# Patient Record
Sex: FEMALE | Race: WHITE | NOT HISPANIC OR LATINO | Employment: OTHER | URBAN - METROPOLITAN AREA
[De-identification: names, ages, dates, MRNs, and addresses within clinical notes are randomized per-mention and may not be internally consistent; named-entity substitution may affect disease eponyms.]

---

## 2017-01-10 RX ORDER — METOPROLOL TARTRATE 50 MG/1
TABLET ORAL
Qty: 60 TABLET | Refills: 0 | Status: SHIPPED | OUTPATIENT
Start: 2017-01-10 | End: 2017-02-10 | Stop reason: SDUPTHER

## 2017-01-12 ENCOUNTER — OFFICE VISIT (OUTPATIENT)
Dept: INTERNAL MEDICINE | Facility: CLINIC | Age: 82
End: 2017-01-12
Payer: MEDICARE

## 2017-01-12 ENCOUNTER — TELEPHONE (OUTPATIENT)
Dept: INTERNAL MEDICINE | Facility: CLINIC | Age: 82
End: 2017-01-12

## 2017-01-12 VITALS
HEART RATE: 62 BPM | TEMPERATURE: 98 F | HEIGHT: 64 IN | BODY MASS INDEX: 30.14 KG/M2 | SYSTOLIC BLOOD PRESSURE: 130 MMHG | WEIGHT: 176.56 LBS | DIASTOLIC BLOOD PRESSURE: 70 MMHG

## 2017-01-12 DIAGNOSIS — E03.1 CONGENITAL HYPOTHYROIDISM WITHOUT GOITER: Chronic | ICD-10-CM

## 2017-01-12 DIAGNOSIS — I70.0 ATHEROSCLEROSIS OF AORTA: ICD-10-CM

## 2017-01-12 DIAGNOSIS — I10 ESSENTIAL HYPERTENSION: Chronic | ICD-10-CM

## 2017-01-12 DIAGNOSIS — N18.30 TYPE 2 DIABETES MELLITUS WITH STAGE 3 CHRONIC KIDNEY DISEASE, WITHOUT LONG-TERM CURRENT USE OF INSULIN: ICD-10-CM

## 2017-01-12 DIAGNOSIS — E11.22 TYPE 2 DIABETES MELLITUS WITH STAGE 3 CHRONIC KIDNEY DISEASE, WITHOUT LONG-TERM CURRENT USE OF INSULIN: ICD-10-CM

## 2017-01-12 PROCEDURE — 99499 UNLISTED E&M SERVICE: CPT | Mod: S$GLB,,, | Performed by: FAMILY MEDICINE

## 2017-01-12 PROCEDURE — 99999 PR PBB SHADOW E&M-EST. PATIENT-LVL III: CPT | Mod: PBBFAC,,, | Performed by: FAMILY MEDICINE

## 2017-01-12 PROCEDURE — 3078F DIAST BP <80 MM HG: CPT | Mod: S$GLB,,, | Performed by: FAMILY MEDICINE

## 2017-01-12 PROCEDURE — 1126F AMNT PAIN NOTED NONE PRSNT: CPT | Mod: S$GLB,,, | Performed by: FAMILY MEDICINE

## 2017-01-12 PROCEDURE — 1159F MED LIST DOCD IN RCRD: CPT | Mod: S$GLB,,, | Performed by: FAMILY MEDICINE

## 2017-01-12 PROCEDURE — 3075F SYST BP GE 130 - 139MM HG: CPT | Mod: S$GLB,,, | Performed by: FAMILY MEDICINE

## 2017-01-12 PROCEDURE — 1157F ADVNC CARE PLAN IN RCRD: CPT | Mod: S$GLB,,, | Performed by: FAMILY MEDICINE

## 2017-01-12 PROCEDURE — 1160F RVW MEDS BY RX/DR IN RCRD: CPT | Mod: S$GLB,,, | Performed by: FAMILY MEDICINE

## 2017-01-12 PROCEDURE — 99214 OFFICE O/P EST MOD 30 MIN: CPT | Mod: S$GLB,,, | Performed by: FAMILY MEDICINE

## 2017-01-12 NOTE — PROGRESS NOTES
Subjective:       Patient ID: Loida Holden is a 81 y.o. female.    Chief Complaint: Follow-up    HPI Comments: DM:       Pt is an 81 year DM who has  Little elevated A1C last time to 7.4. Her sugars today are getting better over the the last month.    Review of Systems   Constitutional: Negative.    Respiratory: Negative.    Gastrointestinal: Negative.    Genitourinary: Negative.    Musculoskeletal: Negative.    Psychiatric/Behavioral: Negative.        Objective:      Physical Exam   Constitutional: She is oriented to person, place, and time. She appears well-developed and well-nourished.   Cardiovascular: Normal rate and regular rhythm.    Pulmonary/Chest: Effort normal and breath sounds normal.   Feet:   Right Foot:   Protective Sensation: 10 sites tested. 10 sites sensed.   Skin Integrity: Positive for callus and dry skin.   Left Foot:   Protective Sensation: 10 sites tested. 10 sites sensed.   Skin Integrity: Positive for callus and dry skin.   Neurological: She is alert and oriented to person, place, and time.       Assessment:       1. Uncontrolled type 2 diabetes mellitus with complication, without long-term current use of insulin    2. Type 2 diabetes mellitus with stage 3 chronic kidney disease, without long-term current use of insulin    3. Atherosclerosis of aorta    4. Congenital hypothyroidism without goiter    5. Essential hypertension        Plan:       Uncontrolled type 2 diabetes mellitus with complication, without long-term current use of insulin  Comments:  Will continue to monitor  Orders:  -     Hemoglobin A1c; Future; Expected date: 2/1/17  -     Microalbumin/creatinine urine ratio; Future; Expected date: 2/1/17    Type 2 diabetes mellitus with stage 3 chronic kidney disease, without long-term current use of insulin  Comments:  Will continue to monitor kidney.    Atherosclerosis of aorta  Comments:  Stable at this time    Congenital hypothyroidism without goiter  Comments:  Thyroid  is stable    Essential hypertension  Comments:  Pt BP is controlled. will get a Lipid panel and CMP with CBC  Orders:  -     Comprehensive metabolic panel; Future; Expected date: 2/1/17  -     CBC auto differential; Future; Expected date: 2/1/17  -     Lipid panel; Future; Expected date: 2/1/17

## 2017-01-12 NOTE — MR AVS SNAPSHOT
[facility] - Internal Medicine  [address] [place] [address]  Phone: [phone]  Fax: [phone]                  Loida Holden   2017 9:20 AM   Office Visit    Description:  Female : 1935   Provider:  Twin Petersen MD   Department:  [facility] - Internal Medicine           Reason for Visit     Follow-up           Diagnoses this Visit        Comments    Uncontrolled type 2 diabetes mellitus with complication, without long-term current use of insulin    -  Primary Will continue to monitor    Type 2 diabetes mellitus with stage 3 chronic kidney disease, without long-term current use of insulin     Will continue to monitor kidney.    Atherosclerosis of aorta     Stable at this time    Congenital hypothyroidism without goiter     Thyroid is stable    Essential hypertension     Pt BP is controlled. will get a Lipid panel and CMP with CBC           To Do List           Future Appointments        Provider Department Dept Phone    2017 9:30 AM FIELDS, VISUAL-ONE [facility] Ophthalmology [phone]    2017 10:15 AM George Bailey MD [facility] Ophthalmology [phone]      Goals (5 Years of Data)     None      Follow-Up and Disposition     Return in about 4 months (around 2017).      [facility]     [facility] On [facility] Nurse Care Line -  Assistance  Registered nurses in the [facility] provide clinical advisement, health education, appointment booking, and other advisory services.  Call for this free service at [phone].             Medications           Message regarding Medications     Verify the changes and/or additions to your medication regime listed below are the same as discussed with your clinician today.  If any of these changes or additions are incorrect, please notify your healthcare provider.        STOP taking these medications     aspirin (ECOTRIN) 81 MG EC tablet Take 81 mg by mouth once daily.    betamethasone valerate 0.1%  "(VALISONE) 0.1 % Crea Apply topically 2 (two) times daily.           Verify that the below list of medications is an accurate representation of the medications you are currently taking.  If none reported, the list may be blank. If incorrect, please contact your healthcare provider. Carry this list with you in case of emergency.           Current Medications     ACCU-CHEK NANNETTE PLUS METER [facility]     Al hyd-Mg tr-alg ac-sod bicarb (GAVISCON) 80-14.2 mg Chew Take by mouth.    allopurinol (ZYLOPRIM) 100 MG tablet TAKE ONE TABLET BY MOUTH ONCE DAILY    BIOTIN ORAL Take 5,000 mcg by mouth once daily.    blood sugar diagnostic (TRUETEST TEST STRIPS) Strp 1 strip by Misc.(Non-Drug; Combo Route) route 3 (three) times daily. True test strips    hydrochlorothiazide (HYDRODIURIL) 25 MG tablet Take 1 tablet (25 mg total) by mouth once daily.    lancets 33 gauge Misc 1 lancet by Misc.(Non-Drug; Combo Route) route 3 (three) times daily.    levothyroxine (SYNTHROID) 50 MCG tablet Take 1 tablet (50 mcg total) by mouth before breakfast.    metoprolol tartrate (LOPRESSOR) 50 MG tablet TAKE ONE TABLET BY MOUTH TWICE DAILY    sodium,potassium,mag sulfates (SUPREP BOWEL PREP KIT) 17.5-3.13-1.6 gram SolR Take 1 kit by mouth as directed.    walker Misc Use rollator walker to reduce stress on knee           Clinical Reference Information           Vital Signs - Last Recorded  Most recent update: 1/12/2017  9:13 AM by Charlee Mills LPN    BP Pulse Temp    130/70 (BP Location: Right arm, Patient Position: Sitting, BP Method: Manual) 62 97.5 °F (36.4 °C) (Tympanic)    Ht Wt BMI    5' 4" (1.626 m) 80.1 kg (176 lb 9.4 oz) 30.31 kg/m2      Blood Pressure          Most Recent Value    BP  130/70      Allergies as of 1/12/2017     Codeine    Doxycycline    Iodine And Iodide Containing Products    Ivp Dye  [Iodinated Contrast Media - Iv Dye]    Latex, Natural Rubber    Metronidazole Hcl    Pantoprazole    Procaine    Rosuvastatin    Simvastatin "    Sulfasalazine      Immunizations Administered on Date of Encounter - 1/12/2017     None      Orders Placed During Today's Visit     Future Labs/Procedures Expected by Expires    CBC auto differential  2/1/2017 3/13/2018    Comprehensive metabolic panel  2/1/2017 3/13/2018    Hemoglobin A1c  2/1/2017 3/13/2018    Lipid panel  2/1/2017 1/12/2018    Microalbumin/creatinine urine ratio  2/1/2017 3/13/2018

## 2017-01-12 NOTE — TELEPHONE ENCOUNTER
Patient informed that she will received an letter in the mail letting her know when it is time to get patient in to be seen,

## 2017-01-12 NOTE — TELEPHONE ENCOUNTER
----- Message from Deirdre Falcon sent at 1/12/2017 11:12 AM CST -----  Contact: pt  States she was seen this morning and she needs to know when her next appt with Dr Petersen should be. Please call pt at [phone]. Thank you

## 2017-01-22 DIAGNOSIS — I10 ESSENTIAL HYPERTENSION: ICD-10-CM

## 2017-01-22 RX ORDER — HYDROCHLOROTHIAZIDE 25 MG/1
TABLET ORAL
Qty: 60 TABLET | Refills: 0 | Status: SHIPPED | OUTPATIENT
Start: 2017-01-22 | End: 2017-03-20 | Stop reason: SDUPTHER

## 2017-01-25 ENCOUNTER — OFFICE VISIT (OUTPATIENT)
Dept: CARDIOLOGY | Facility: CLINIC | Age: 82
End: 2017-01-25
Payer: MEDICARE

## 2017-01-25 ENCOUNTER — OFFICE VISIT (OUTPATIENT)
Dept: INTERNAL MEDICINE | Facility: CLINIC | Age: 82
End: 2017-01-25
Payer: MEDICARE

## 2017-01-25 ENCOUNTER — TELEPHONE (OUTPATIENT)
Dept: CARDIOLOGY | Facility: CLINIC | Age: 82
End: 2017-01-25

## 2017-01-25 ENCOUNTER — TELEPHONE (OUTPATIENT)
Dept: INTERNAL MEDICINE | Facility: CLINIC | Age: 82
End: 2017-01-25

## 2017-01-25 VITALS
WEIGHT: 174.81 LBS | TEMPERATURE: 97 F | SYSTOLIC BLOOD PRESSURE: 180 MMHG | HEIGHT: 64 IN | DIASTOLIC BLOOD PRESSURE: 102 MMHG | HEART RATE: 67 BPM | BODY MASS INDEX: 29.84 KG/M2

## 2017-01-25 VITALS
DIASTOLIC BLOOD PRESSURE: 88 MMHG | SYSTOLIC BLOOD PRESSURE: 184 MMHG | HEIGHT: 64 IN | HEART RATE: 68 BPM | WEIGHT: 168 LBS | BODY MASS INDEX: 28.68 KG/M2

## 2017-01-25 DIAGNOSIS — I10 ESSENTIAL HYPERTENSION: Primary | Chronic | ICD-10-CM

## 2017-01-25 DIAGNOSIS — E11.69 HYPERLIPIDEMIA ASSOCIATED WITH TYPE 2 DIABETES MELLITUS: Chronic | ICD-10-CM

## 2017-01-25 DIAGNOSIS — I25.10 CORONARY ARTERY DISEASE, OCCLUSIVE: Chronic | ICD-10-CM

## 2017-01-25 DIAGNOSIS — D32.9 MENINGIOMA: ICD-10-CM
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
## 2017-01-25 DIAGNOSIS — Z86.73 HISTORY OF CVA (CEREBROVASCULAR ACCIDENT): ICD-10-CM

## 2017-01-25 DIAGNOSIS — I10 ESSENTIAL HYPERTENSION: Primary | ICD-10-CM

## 2017-01-25 DIAGNOSIS — E78.5 HYPERLIPIDEMIA ASSOCIATED WITH TYPE 2 DIABETES MELLITUS: Chronic | ICD-10-CM

## 2017-01-25 DIAGNOSIS — E11.22 TYPE 2 DIABETES MELLITUS WITH STAGE 3 CHRONIC KIDNEY DISEASE, WITHOUT LONG-TERM CURRENT USE OF INSULIN: ICD-10-CM

## 2017-01-25 DIAGNOSIS — N18.30 TYPE 2 DIABETES MELLITUS WITH STAGE 3 CHRONIC KIDNEY DISEASE, WITHOUT LONG-TERM CURRENT USE OF INSULIN: ICD-10-CM

## 2017-01-25 DIAGNOSIS — I51.89 LEFT VENTRICULAR DIASTOLIC DYSFUNCTION WITH PRESERVED SYSTOLIC FUNCTION: ICD-10-CM

## 2017-01-25 PROCEDURE — 99999 PR PBB SHADOW E&M-EST. PATIENT-LVL III: CPT | Mod: PBBFAC,,, | Performed by: FAMILY MEDICINE

## 2017-01-25 PROCEDURE — 1159F MED LIST DOCD IN RCRD: CPT | Mod: S$GLB,,, | Performed by: PHYSICIAN ASSISTANT

## 2017-01-25 PROCEDURE — 93000 ELECTROCARDIOGRAM COMPLETE: CPT | Mod: S$GLB,,, | Performed by: INTERNAL MEDICINE

## 2017-01-25 PROCEDURE — 3080F DIAST BP >= 90 MM HG: CPT | Mod: S$GLB,,, | Performed by: FAMILY MEDICINE

## 2017-01-25 PROCEDURE — 99214 OFFICE O/P EST MOD 30 MIN: CPT | Mod: S$GLB,,, | Performed by: PHYSICIAN ASSISTANT

## 2017-01-25 PROCEDURE — 1159F MED LIST DOCD IN RCRD: CPT | Mod: S$GLB,,, | Performed by: FAMILY MEDICINE

## 2017-01-25 PROCEDURE — 99213 OFFICE O/P EST LOW 20 MIN: CPT | Mod: S$GLB,,, | Performed by: FAMILY MEDICINE

## 2017-01-25 PROCEDURE — 99999 PR PBB SHADOW E&M-EST. PATIENT-LVL III: CPT | Mod: PBBFAC,,, | Performed by: PHYSICIAN ASSISTANT

## 2017-01-25 PROCEDURE — 1160F RVW MEDS BY RX/DR IN RCRD: CPT | Mod: S$GLB,,, | Performed by: FAMILY MEDICINE

## 2017-01-25 PROCEDURE — 1157F ADVNC CARE PLAN IN RCRD: CPT | Mod: S$GLB,,, | Performed by: FAMILY MEDICINE

## 2017-01-25 PROCEDURE — 1157F ADVNC CARE PLAN IN RCRD: CPT | Mod: S$GLB,,, | Performed by: PHYSICIAN ASSISTANT

## 2017-01-25 PROCEDURE — 1126F AMNT PAIN NOTED NONE PRSNT: CPT | Mod: S$GLB,,, | Performed by: PHYSICIAN ASSISTANT

## 2017-01-25 PROCEDURE — 3079F DIAST BP 80-89 MM HG: CPT | Mod: S$GLB,,, | Performed by: PHYSICIAN ASSISTANT

## 2017-01-25 PROCEDURE — 1160F RVW MEDS BY RX/DR IN RCRD: CPT | Mod: S$GLB,,, | Performed by: PHYSICIAN ASSISTANT

## 2017-01-25 PROCEDURE — 3077F SYST BP >= 140 MM HG: CPT | Mod: S$GLB,,, | Performed by: PHYSICIAN ASSISTANT

## 2017-01-25 PROCEDURE — 3077F SYST BP >= 140 MM HG: CPT | Mod: S$GLB,,, | Performed by: FAMILY MEDICINE

## 2017-01-25 RX ORDER — AMLODIPINE BESYLATE 5 MG/1
2.5 TABLET ORAL DAILY
Qty: 30 TABLET | Refills: 3 | Status: SHIPPED | OUTPATIENT
Start: 2017-01-25 | End: 2017-02-16 | Stop reason: SDUPTHER

## 2017-01-25 NOTE — PROGRESS NOTES
"Subjective:    Patient ID:  Loida Holden is a 81 y.o. female who presents for follow-up of HTN      HPI   Ms. Holden is an 81 year old female patient with a PMHx of CAD s/p stents, hyperlipidemia, HTN, DM, and obesity who presents today for follow-up. Patient was at an outside facility for GI endoscopy (Dr. Covarrubias) and had issues with high BP and fall post-procedure. She was seen by primary care earlier today and referred to us for additional evaluation. Patient reports she still feels "off-balance" since procedure. States she fell backwards and hit her head against the wall and also hit her elbows. Denies any associated chest pain, palpitations, or SOB. No LOC per patient. BP after incident was noted to be elevated (systolic > 200), slightly improved at today's visit. Patient reports compliance with her medications, has only had one dose of Lopressor this AM. She reports her BP fluctuates at home but is generally elevated. EKG today in clinic shows sinus bradycardia with occasional PVC's, possible inferior and anterior infarct.     Review of Systems   Constitution: Negative for chills, decreased appetite, fever, weakness and malaise/fatigue.   HENT: Negative for congestion, headaches, hoarse voice and sore throat.    Eyes: Negative for blurred vision and discharge.   Cardiovascular: Negative for chest pain, claudication, cyanosis, dyspnea on exertion, irregular heartbeat, leg swelling, near-syncope, orthopnea, palpitations and paroxysmal nocturnal dyspnea.   Respiratory: Negative for cough, hemoptysis, shortness of breath, snoring, sputum production and wheezing.    Endocrine: Negative for cold intolerance and heat intolerance.   Hematologic/Lymphatic: Negative for bleeding problem. Does not bruise/bleed easily.   Skin: Negative for rash.   Musculoskeletal: Positive for falls and joint pain. Negative for arthritis, back pain, joint swelling, muscle cramps, muscle weakness and myalgias. " "  Gastrointestinal: Negative for abdominal pain, constipation, diarrhea, heartburn, melena and nausea.   Genitourinary: Negative for hematuria.   Neurological: Negative for dizziness, focal weakness, light-headedness, loss of balance, numbness, paresthesias and seizures.   Psychiatric/Behavioral: Negative for memory loss. The patient does not have insomnia.    Allergic/Immunologic: Negative for hives.     Visit Vitals    BP (!) 184/88    Pulse 68    Ht 5' 4" (1.626 m)    Wt 76.2 kg (167 lb 15.9 oz)    BMI 28.84 kg/m2       Objective:    Physical Exam   Constitutional: She is oriented to person, place, and time. She appears well-developed and well-nourished. No distress.   HENT:   Head: Normocephalic and atraumatic.   Eyes: Pupils are equal, round, and reactive to light. Right eye exhibits no discharge. Left eye exhibits no discharge.   Neck: Neck supple. No JVD present. No tracheal deviation present. No thyromegaly present.   Cardiovascular: Normal rate, regular rhythm, S1 normal, S2 normal and normal heart sounds.  PMI is not displaced.  Exam reveals no gallop, no S3, no S4 and no friction rub.    No murmur heard.  Pulses:       Radial pulses are 2+ on the right side, and 2+ on the left side.   Pulmonary/Chest: Effort normal and breath sounds normal. No respiratory distress. She has no wheezes. She has no rales.   Abdominal: Soft. She exhibits no distension. There is no tenderness. There is no rebound.   Musculoskeletal: She exhibits no edema.   Neurological: She is alert and oriented to person, place, and time.   Skin: Skin is warm and dry. She is not diaphoretic. No erythema.   Psychiatric: She has a normal mood and affect. Her behavior is normal. Thought content normal.   Nursing note and vitals reviewed.      2D Echo CONCLUSIONS     1 - Concentric remodeling.     2 - Normal left ventricular systolic function (EF 55-60%).     3 - Left ventricular diastolic dysfunction.     4 - Normal right ventricular " systolic function .     5 - The estimated PA systolic pressure is 15 mmHg.     Impression: NORMAL MYOCARDIAL PERFUSION  1. The perfusion scan is free of evidence for myocardial ischemia or injury. The inferior wall is obscured by bowel.  2. Resting wall motion is physiologic.   3. Resting LV function is normal.   4. The ventricular volumes are normal at rest and stress.   5. The extracardiac distribution of radioactivity is normal.   Assessment:       1. Essential hypertension    2. Coronary artery disease, occlusive    3. Left ventricular diastolic dysfunction with preserved systolic function    4. Type 2 diabetes mellitus with stage 3 chronic kidney disease, without long-term current use of insulin    5. Hyperlipidemia associated with type 2 diabetes mellitus    6. History of CVA (cerebrovascular accident)    7. Fall, subsequent encounter       Patient who presents with elevated BP and fall s/p GI procedure this AM. As patient her head and was feeling unwell in the office, I recommended ED for further evaluation and treatment. She refused. With respect to her BP, will consider adding amlodipine to regimen and assessing response. Will verify no hydralazine has been sent to pharmacy beforehand.   Plan:     -Strongly advised ED evaluation, patient refused  -Continue current meds for now  -Will consider adding low dose amlodipine  -Cardiac low salt diet  -Follow-up TBA    Chart reviewed. Dr. Hough agrees with plan as outlined above.

## 2017-01-25 NOTE — TELEPHONE ENCOUNTER
----- Message from REBECCA Ray sent at 1/25/2017  1:45 PM CST -----  Can you please call [facility] to see if hydralazine was called in?    Thanks

## 2017-01-25 NOTE — MR AVS SNAPSHOT
[facility] Cardiology  [address] [place] [address]  Phone: [phone]  Fax: [phone]                  Loida Holden   2017 1:00 PM   Office Visit    Description:  Female : 1935   Provider:  REBECCA Ray   Department:  [facility] - Cardiology           Reason for Visit     Hypertension     Hyperlipidemia           Diagnoses this Visit        Comments    Coronary artery disease, occlusive    -  Primary     Essential hypertension         Left ventricular diastolic dysfunction with preserved systolic function         Type 2 diabetes mellitus with stage 3 chronic kidney disease, without long-term current use of insulin         Hyperlipidemia associated with type 2 diabetes mellitus         History of CVA (cerebrovascular accident)         Fall, subsequent encounter                To Do List           Future Appointments        Provider Department Dept Phone    2017 8:20 AM Kim Dhillon MD [facility] Neurology [phone]    2/15/2017 9:00 AM LABORATORY, [facility] [phone]    2/15/2017 9:10 AM SPECIMEN, [facility] [phone]    3/9/2017 11:20 AM Conrad Hough MD [facility] Cardiology [phone]    2017 9:30 AM FIELDS, VISUAL-ONE [facility] Ophthalmology [phone]      Goals (5 Years of Data)     None      [facility]     [facility] Nurse Care Line -  Assistance  Registered nurses in the [facility] provide clinical advisement, health education, appointment booking, and other advisory services.  Call for this free service at [phone].             Medications           Message regarding Medications     Verify the changes and/or additions to your medication regime listed below are the same as discussed with your clinician today.  If any of these changes or additions are incorrect, please notify your healthcare provider.             Verify that the below list of medications  "is an accurate representation of the medications you are currently taking.  If none reported, the list may be blank. If incorrect, please contact your healthcare provider. Carry this list with you in case of emergency.           Current Medications     ACCU-CHEK [place]ANNET[place] PLUS METER [facility]     Al hyd-Mg tr-alg ac-sod bicarb (GAVISCON) 80-14.2 mg Chew Take by mouth.    allopurinol (ZYLOPRIM) 100 MG tablet TAKE ONE TABLET BY MOUTH ONCE DAILY    BIOTIN ORAL Take 5,000 mcg by mouth once daily.    blood sugar diagnostic (TRUETEST TEST STRIPS) Strp 1 strip by Misc.(Non-Drug; Combo Route) route 3 (three) times daily. True test strips    hydrochlorothiazide (HYDRODIURIL) 25 MG tablet TAKE ONE TABLET BY MOUTH ONCE DAILY TAKE  ONE  ADDITIONAL  TABLET  A  DAY  FOR  FLUID  RETENTION    lancets 33 gauge Misc 1 lancet by Misc.(Non-Drug; Combo Route) route 3 (three) times daily.    levothyroxine (SYNTHROID) 50 MCG tablet Take 1 tablet (50 mcg total) by mouth before breakfast.    metoprolol tartrate (LOPRESSOR) 50 MG tablet TAKE ONE TABLET BY MOUTH TWICE DAILY    sodium,potassium,mag sulfates (SUPREP BOWEL PREP KIT) 17.5-3.13-1.6 gram SolR Take 1 kit by mouth as directed.    walker Misc Use rollator walker to reduce stress on knee           Clinical Reference Information           Vital Signs - Last Recorded  Most recent update: 1/25/2017  1:26 PM by Reyna French MA    BP Pulse Ht Wt BMI    (!) 184/88 68 5' 4" (1.626 m) 76.2 kg (167 lb 15.9 oz) 28.84 kg/m2      Blood Pressure          Most Recent Value    BP  (!)  184/88      Allergies as of 1/25/2017     Codeine    Doxycycline    Iodine And Iodide Containing Products    Ivp Dye  [Iodinated Contrast Media - Iv Dye]    Latex, Natural Rubber    Metronidazole Hcl    Pantoprazole    Procaine    Rosuvastatin    Simvastatin    Sulfasalazine      Immunizations Administered on Date of Encounter - 1/25/2017     None      Orders Placed During Today's Visit      Normal Orders This Visit    IN " OFFICE EKG 12-LEAD (to [place])

## 2017-01-25 NOTE — TELEPHONE ENCOUNTER
----- Message from Rafaela Parrish sent at 1/25/2017  9:14 AM CST -----  Contact: iLlia Covarrubias [phone]   Would like to consult with the nurse about patient, Pt has a 11am appointment for today schedule.  Please call back @ [phone].  Thanks-[facility]

## 2017-01-25 NOTE — TELEPHONE ENCOUNTER
Returned call to Marcela. She stated that patient was there for a procedure. Before the procedure her BP was 172/76 and after the procedure it was 118/68. She stated that the patient was sedated for the procedure and when she stood up to put on her cloths she started to black out and fall. She hit her elbow on the wall and her BP was 200/78. Patient has an appointment today at 11:00

## 2017-01-25 NOTE — TELEPHONE ENCOUNTER
Spoke with pt son notified him the information. Pt is scheduled to see Dr. Hough 2/16/17 appt letter mailed.

## 2017-01-25 NOTE — PATIENT INSTRUCTIONS
Hydralazine:     1 tablet if your top number is greater than 160 and/or the bottom is greater than 100. Repeat your blood pressure 2 hours after taking the medication. You can do this up to 4 more times if remains high but always check blood pressure 2 hours after taking med.

## 2017-01-25 NOTE — PROGRESS NOTES
Subjective:       Patient ID: Loida Holden is a 81 y.o. female.    Chief Complaint: Hospital Follow Up and Fall    HPI Comments: HTN:        Pt has up and down blood pressure and fell after a GI procedure today. Pt presented to the procedure with /76. Pt is no presenting with 180/102.     Review of Systems   Constitutional: Negative.    Respiratory: Negative.    Cardiovascular: Negative.    Genitourinary: Negative.    Neurological: Negative.    Hematological: Negative.        Objective:      Physical Exam   Constitutional: She is oriented to person, place, and time. She appears well-developed and well-nourished.   Cardiovascular: Normal rate and regular rhythm.    Pulmonary/Chest: Effort normal and breath sounds normal.   Abdominal: Soft. Bowel sounds are normal.   Neurological: She is alert and oriented to person, place, and time.   Skin: Skin is dry.       Assessment:       1. Essential hypertension    2. Meningioma        Plan:       Essential hypertension  Comments:  Will start pt on hydralazine as needed for now and have her go back to cardiology    Meningioma  Comments:  Will have pt return to neurology

## 2017-01-25 NOTE — MR AVS SNAPSHOT
[facility] Internal Medicine  [address] [place] [address]  Phone: [phone]  Fax: [phone]                  Loida Holden   2017 11:00 AM   Office Visit    Description:  Female : 1935   Provider:  Twin Petersen MD   Department:  [facility] - Internal Medicine           Reason for Visit     Hospital Follow Up     Fall           Diagnoses this Visit        Comments    Essential hypertension    -  Primary Will start pt on hydralazine as needed for now and have her go back to cardiology    Meningioma     Will have pt return to neurology           To Do List           Future Appointments        Provider Department Dept Phone    2/15/2017 9:00 AM LABORATORY, [facility] [phone]    2/15/2017 9:10 AM SPECIMEN, [facility] [phone]    3/9/2017 11:20 AM Conrad Hough MD [facility] Cardiology [phone]    2017 9:30 AM FIELDS, VISUAL-ONE [facility] Ophthalmology [phone]    2017 10:15 AM George Bailey MD [facility] Ophthalmology [phone]      Goals (5 Years of Data)     None      Follow-Up and Disposition     Return in about 3 months (around 2017).      [facility]     [facility] Nurse Care Line -  Assistance  Registered nurses in the [facility] provide clinical advisement, health education, appointment booking, and other advisory services.  Call for this free service at [phone].             Medications           Message regarding Medications     Verify the changes and/or additions to your medication regime listed below are the same as discussed with your clinician today.  If any of these changes or additions are incorrect, please notify your healthcare provider.             Verify that the below list of medications is an accurate representation of the medications you are currently taking.  If none reported, the list may be blank. If incorrect, please contact  "your healthcare provider. Carry this list with you in case of emergency.           Current Medications     ACCU-CHEK NANNETTE PLUS METER [facility]     Al hyd-Mg tr-alg ac-sod bicarb (GAVISCON) 80-14.2 mg Chew Take by mouth.    allopurinol (ZYLOPRIM) 100 MG tablet TAKE ONE TABLET BY MOUTH ONCE DAILY    BIOTIN ORAL Take 5,000 mcg by mouth once daily.    blood sugar diagnostic (TRUETEST TEST STRIPS) Strp 1 strip by Misc.(Non-Drug; Combo Route) route 3 (three) times daily. True test strips    hydrochlorothiazide (HYDRODIURIL) 25 MG tablet TAKE ONE TABLET BY MOUTH ONCE DAILY TAKE  ONE  ADDITIONAL  TABLET  A  DAY  FOR  FLUID  RETENTION    lancets 33 gauge Misc 1 lancet by Misc.(Non-Drug; Combo Route) route 3 (three) times daily.    levothyroxine (SYNTHROID) 50 MCG tablet Take 1 tablet (50 mcg total) by mouth before breakfast.    metoprolol tartrate (LOPRESSOR) 50 MG tablet TAKE ONE TABLET BY MOUTH TWICE DAILY    sodium,potassium,mag sulfates (SUPREP BOWEL PREP KIT) 17.5-3.13-1.6 gram SolR Take 1 kit by mouth as directed.    walker Misc Use rollator walker to reduce stress on knee           Clinical Reference Information           Vital Signs - Last Recorded  Most recent update: 1/25/2017 11:48 AM by Charlee Mills LPN    BP Pulse Temp    (!) 180/102 (BP Location: Right arm, Patient Position: Sitting, BP Method: Manual) 67 97.1 °F (36.2 °C) (Tympanic)    Ht Wt BMI    5' 4" (1.626 m) 79.3 kg (174 lb 13.2 oz) 30.01 kg/m2      Blood Pressure          Most Recent Value    BP  (!)  180/102      Allergies as of 1/25/2017     Codeine    Doxycycline    Iodine And Iodide Containing Products    Ivp Dye  [Iodinated Contrast Media - Iv Dye]    Latex, Natural Rubber    Metronidazole Hcl    Pantoprazole    Procaine    Rosuvastatin    Simvastatin    Sulfasalazine      Immunizations Administered on Date of Encounter - 1/25/2017     None      Instructions    Hydralazine:     1 tablet if your top number is greater than 160 and/or the bottom is " greater than 100. Repeat your blood pressure 2 hours after taking the medication. You can do this up to 4 more times if remains high but always check blood pressure 2 hours after taking med.

## 2017-01-25 NOTE — TELEPHONE ENCOUNTER
----- Message from REBECCA Ray sent at 1/25/2017  4:30 PM CST -----  Please phone patient. I do not have any record of hydralazine being sent. Start amlodipine 2.5 mg daily, prescription sent to pharmacy. She needs to log her BP in AM and PM at least 1-2 hours after meds. Follow-up with Dr. Hough in 2 weeks.     She needs to call if any low readings    Thanks

## 2017-01-26 ENCOUNTER — OFFICE VISIT (OUTPATIENT)
Dept: NEUROLOGY | Facility: CLINIC | Age: 82
End: 2017-01-26
Payer: MEDICARE

## 2017-01-26 VITALS
WEIGHT: 175.94 LBS | DIASTOLIC BLOOD PRESSURE: 86 MMHG | SYSTOLIC BLOOD PRESSURE: 170 MMHG | HEART RATE: 56 BPM | HEIGHT: 64 IN | BODY MASS INDEX: 30.04 KG/M2

## 2017-01-26 DIAGNOSIS — R29.6 RECURRENT FALLS: ICD-10-CM

## 2017-01-26 DIAGNOSIS — I67.9 DISTURBANCES OF VISION DUE TO CEREBROVASCULAR DISEASE: ICD-10-CM

## 2017-01-26 DIAGNOSIS — H53.9 DISTURBANCES OF VISION DUE TO CEREBROVASCULAR DISEASE: ICD-10-CM

## 2017-01-26 DIAGNOSIS — G93.0 ARACHNOID CYST: Primary | ICD-10-CM

## 2017-01-26 DIAGNOSIS — R51.9 HEADACHE DISORDER: ICD-10-CM

## 2017-01-26 PROCEDURE — 1159F MED LIST DOCD IN RCRD: CPT | Mod: S$GLB,,, | Performed by: PSYCHIATRY & NEUROLOGY

## 2017-01-26 PROCEDURE — 99215 OFFICE O/P EST HI 40 MIN: CPT | Mod: S$GLB,,, | Performed by: PSYCHIATRY & NEUROLOGY

## 2017-01-26 PROCEDURE — 1160F RVW MEDS BY RX/DR IN RCRD: CPT | Mod: S$GLB,,, | Performed by: PSYCHIATRY & NEUROLOGY

## 2017-01-26 PROCEDURE — 3079F DIAST BP 80-89 MM HG: CPT | Mod: S$GLB,,, | Performed by: PSYCHIATRY & NEUROLOGY

## 2017-01-26 PROCEDURE — 1157F ADVNC CARE PLAN IN RCRD: CPT | Mod: S$GLB,,, | Performed by: PSYCHIATRY & NEUROLOGY

## 2017-01-26 PROCEDURE — 3077F SYST BP >= 140 MM HG: CPT | Mod: S$GLB,,, | Performed by: PSYCHIATRY & NEUROLOGY

## 2017-01-26 PROCEDURE — 1126F AMNT PAIN NOTED NONE PRSNT: CPT | Mod: S$GLB,,, | Performed by: PSYCHIATRY & NEUROLOGY

## 2017-01-26 PROCEDURE — 99999 PR PBB SHADOW E&M-EST. PATIENT-LVL III: CPT | Mod: PBBFAC,,, | Performed by: PSYCHIATRY & NEUROLOGY

## 2017-01-26 NOTE — MR AVS SNAPSHOT
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                To Do List           Future Appointments        Provider Department Dept Phone    2017 9:30 AM LAB, SAME DAY [facility] [phone]    2017 7:15 AM [facility] [phone]    2/15/2017 9:00 AM LABORATORY, [facility] [phone]    2/15/2017 9:10 AM SPECIMEN, [facility] [phone]    2017 3:00 PM Conrad Hough MD [facility] [phone]      Goals (5 Years of Data)     None      [facility]     [facility] Nurse Care Line -  Assistance  Registered nurses in the [facility] provide clinical advisement, health education, appointment booking, and other advisory services.  Call for this free service at [phone].             Medications           Message regarding Medications     Verify the changes and/or additions to your medication regime listed below are the same as discussed with your clinician today.  If any of these changes or additions are incorrect, please notify your healthcare provider.             Verify that the below list of medications is an accurate representation of the medications you are currently taking.  If none reported, the list may be blank. If incorrect, please contact your healthcare provider. Carry this list with you in case of emergency.           Current Medications     ACCU-CHEK NANNETTE PLUS METER Misc     Al hyd-Mg  "tr-alg ac-sod bicarb (GAVISCON) 80-14.2 mg Chew Take by mouth.    allopurinol (ZYLOPRIM) 100 MG tablet TAKE ONE TABLET BY MOUTH ONCE DAILY    amlodipine (NORVASC) 5 MG tablet Take 0.5 tablets (2.5 mg total) by mouth once daily.    BIOTIN ORAL Take 5,000 mcg by mouth once daily.    blood sugar diagnostic (TRUETEST TEST STRIPS) Strp 1 strip by Misc.(Non-Drug; Combo Route) route 3 (three) times daily. True test strips    hydrochlorothiazide (HYDRODIURIL) 25 MG tablet TAKE ONE TABLET BY MOUTH ONCE DAILY TAKE  ONE  ADDITIONAL  TABLET  A  DAY  FOR  FLUID  RETENTION    lancets 33 gauge Misc 1 lancet by Misc.(Non-Drug; Combo Route) route 3 (three) times daily.    levothyroxine (SYNTHROID) 50 MCG tablet Take 1 tablet (50 mcg total) by mouth before breakfast.    metoprolol tartrate (LOPRESSOR) 50 MG tablet TAKE ONE TABLET BY MOUTH TWICE DAILY    sodium,potassium,mag sulfates (SUPREP BOWEL PREP KIT) 17.5-3.13-1.6 gram SolR Take 1 kit by mouth as directed.    walker Misc Use rollator walker to reduce stress on knee           Clinical Reference Information           Vital Signs - Last Recorded  Most recent update: 1/26/2017  8:19 AM by Chantell Davis LPN    BP Pulse Ht Wt BMI    (!) 170/86 (!) 56 5' 4" (1.626 m) 79.8 kg (175 lb 14.8 oz) 30.2 kg/m2      Blood Pressure          Most Recent Value    BP  (!)  170/86      Allergies as of 1/26/2017     Codeine    Doxycycline    Iodine And Iodide Containing Products    Ivp Dye  [Iodinated Contrast Media - Iv Dye]    Latex, Natural Rubber    Metronidazole Hcl    Pantoprazole    Procaine    Rosuvastatin    Simvastatin    Sulfasalazine      Immunizations Administered on Date of Encounter - 1/26/2017     None      Orders Placed During Today's Visit     Future Labs/Procedures Expected by Expires    Creatinine, serum  1/26/2017 3/27/2018    MRI Brain W WO Contrast  1/26/2017 1/26/2018      "

## 2017-01-26 NOTE — PROGRESS NOTES
Progress note  Neurology      Neurology follow up:  For: Arachnoid cyst  Vertigo  And headache  Recurrent falls     SUBJECTIVE:      HPI:   She fell yesterday after colonoscopy and EGD had done. She is having more problem in the left eye. She sees less . She said that in October she fell 3 times. Her vision in the left eye is dimming but there is no pain. She hit her head on the back but she did not lose consciousness . She bruised her right elbow and arm .     Past Medical History   Diagnosis Date    Hypertension     Hyperlipidemia     Arthritis     Hiatal hernia     Gastritis     Coronary artery disease      s/p PTCA    Brain cyst      x2    S/P PTCA (percutaneous transluminal coronary angioplasty) 10/23/2013    Hypothyroid     History of shingles     Gout     Recurrent vomiting      gi eval    Dry senile macular degeneration     Colon polyp     Meningioma      6/14 mri dr cuevas    Brain tumor      per patient    Osteopenia 4/15 klever 4/17     Past Surgical History   Procedure Laterality Date    Cholecystectomy       2/2012    Cardiac catheterization       [facility] 2-MRI safe to 3T    Coronary angioplasty      Cataract extraction w/  intraocular lens implant  OD w/YAG    Cataract extraction w/  intraocular lens implant  OS w/YAG    Hysterectomy       Family History   Problem Relation Age of Onset    Heart disease Mother     Cancer Mother      breast    Heart disease Father     Diabetes Maternal Grandfather     Colon cancer Neg Hx     Melanoma Neg Hx     Lupus Neg Hx     Eczema Neg Hx     Psoriasis Maternal Grandmother      History   Substance Use Topics    Smoking status: Former Smoker     Quit date: 01/01/2002    Smokeless tobacco: Never Used    Alcohol Use: No       Allergies   Allergen Reactions    Codeine      Other reaction(s): Unknown    Doxycycline      Other reaction(s): Unknown    Iodine And Iodide Containing Products      Other reaction(s): Unknown    Ivp Dye   [Iodinated Contrast Media - Iv Dye]      Other reaction(s): Unknown    Latex, Natural Rubber Hives    Metronidazole Hcl      Other reaction(s): Unknown  Other reaction(s): Unknown    Pantoprazole      Difficulty urinating      Procaine      Other reaction(s): Unknown  Other reaction(s): Unknown    Rosuvastatin      Other reaction(s): liver enlargement  Other reaction(s): liver enlargement    Simvastatin      Other reaction(s): Unknown  Liver problems      Sulfasalazine Other (See Comments)      Patient Active Problem List   Diagnosis    Hypertension    Hyperlipidemia    Arthritis    Hiatal hernia    Coronary artery disease    Herpes genitalia    Brain cyst    S/P PTCA (percutaneous transluminal coronary angioplasty)    Hypothyroid    Gout    Dermatochalasis - Both Eyes    Lens replaced by other means - Both Eyes    Macular degeneration - Both Eyes    Refractive error - Both Eyes    Cervical spondylosis    Recurrent falls    Procidentia of rectum    Osteopenia         Review of Systems   Constitutional: Negative for fever and weight loss.   HENT: Negative for hearing loss.    Eyes: Positive for blurred vision. Negative for double vision, photophobia and pain.   Respiratory: Negative for cough.    Cardiovascular: Negative for chest pain.   Gastrointestinal: Negative for abdominal pain, nausea and vomiting.   Genitourinary: Negative for dysuria, frequency and urgency.   Musculoskeletal: Positive for falls. Negative for back pain, joint pain, myalgias and neck pain.   Skin: Negative for itching and rash.   Neurological: Negative for tingling and headaches.   Psychiatric/Behavioral: Negative for depression and memory loss.           OBJECTIVE:     Vital Signs (Most Recent)  Pulse: 78 (08/24/15 0932)  BP: (!) 150/74 mmHg (08/24/15 0932)    Physical Exam   Constitutional: She is oriented to person, place, and time. She appears well-developed and well-nourished.   HENT:   Head: Normocephalic and  atraumatic.   Eyes: Conjunctivae and EOM are normal. Pupils are equal, round, and reactive to light.   Neck: Normal range of motion. Neck supple. No JVD present. No tracheal deviation present. No thyromegaly present.   Cardiovascular: Normal rate, regular rhythm and normal heart sounds.    Pulmonary/Chest: Effort normal and breath sounds normal.   Abdominal: She exhibits no distension. There is no tenderness.   Musculoskeletal: Normal range of motion. She exhibits no edema or tenderness.   Neurological: She is alert and oriented to person, place, and time. She has normal strength and normal reflexes. She displays normal reflexes. No cranial nerve deficit or sensory deficit. She exhibits normal muscle tone. She displays a negative Romberg sign. Coordination and gait normal.   Reflex Scores:       Tricep reflexes are 2+ on the right side and 2+ on the left side.       Bicep reflexes are 2+ on the right side and 2+ on the left side.       Brachioradialis reflexes are 2+ on the right side and 2+ on the left side.       Patellar reflexes are 2+ on the right side and 2+ on the left side.       Achilles reflexes are 2+ on the right side and 2+ on the left side.  Skin: Skin is warm and dry. No rash noted.   Psychiatric: She has a normal mood and affect. Her speech is normal and behavior is normal. Judgment and thought content normal. Cognition and memory are normal.   She feels dizzy when she bends forward and gets up.       Diagnostic studies:                             MRI of the brain: 9/17/2015  Impression             Unchanged small anterior right middle cranial fossa meningioma.    Unchanged probable arachnoid cyst in the anterior left middle cranial fossa.    Mild degree of chronic white matter signal changes likely representing sequela of microvascular ischemic disease, unchanged from prior.    No evidence of acute infarction or other acute intracranial findings.         MRI of C-spine; 9/17/2015      Impression         Multilevel degenerative disease and facet arthropathy as detailed above.  Findings appear similar to prior and or most pronounced at the level of C5-6.    Mild grade 1 anterolisthesis of C6 on C7 is unchanged in             ASSESSMENT/PLAN:     Assessment:     1. Headache: tension headache , no headache now and she is stable.  2.Vertigo : May be peripheral but central cause needs to be ruled out. Recent fall , may be anesthesia effect after the colonoscopy.  3. Moderate to severe cervical spondylosis .  4. Arachnoid cyst in the brain.  5.  Recurrent falls and vision disturbances rule out tumor effect.     Patient Active Problem List   Diagnosis    Hypertension    Hyperlipidemia    Arthritis    Hiatal hernia    Coronary artery disease    Herpes genitalia    Brain cyst    S/P PTCA (percutaneous transluminal coronary angioplasty)    Hypothyroid    Gout    Dermatochalasis - Both Eyes    Lens replaced by other means - Both Eyes    Macular degeneration - Both Eyes    Refractive error - Both Eyes    Cervical spondylosis    Recurrent falls    Procidentia of rectum    Osteopenia         Plan:    No new medication. Will do MRI of the brain.  Time spent: 30 minutes in face to face discussion concerning diagnosis, prognosis, review of lab and test results, benefits of treatment as well as management of disease, counseling of patient and coordination of care between various health care providers . Greater than half the time spent was used for coordination of care and counseling of patient.

## 2017-01-30 ENCOUNTER — TELEPHONE (OUTPATIENT)
Dept: NEUROLOGY | Facility: CLINIC | Age: 82
End: 2017-01-30

## 2017-01-30 NOTE — TELEPHONE ENCOUNTER
Spoke with patient and informed her that Dr Dhillon is out and that I will send request to Dr Feliciano to complete peer to peer review. I informed patient that I will contact her to let her know if he will complete review. Patient verbalized understanding.

## 2017-01-30 NOTE — TELEPHONE ENCOUNTER
----- Message from Kevin Garsia sent at 1/30/2017 12:22 PM CST -----  Contact: pt   States she is calling rg her MRI not being authorized and has the appt on tomorrow and wants to discuss and can be reached at [phone]//thanks/dbw

## 2017-01-31 ENCOUNTER — HOSPITAL ENCOUNTER (OUTPATIENT)
Dept: RADIOLOGY | Facility: HOSPITAL | Age: 82
Discharge: HOME | End: 2017-01-31
Attending: PSYCHIATRY & NEUROLOGY
Payer: MEDICARE

## 2017-01-31 DIAGNOSIS — G93.0 ARACHNOID CYST: ICD-10-CM

## 2017-01-31 DIAGNOSIS — I67.9 DISTURBANCES OF VISION DUE TO CEREBROVASCULAR DISEASE: ICD-10-CM

## 2017-01-31 DIAGNOSIS — H53.9 DISTURBANCES OF VISION DUE TO CEREBROVASCULAR DISEASE: ICD-10-CM

## 2017-01-31 DIAGNOSIS — R51.9 HEADACHE DISORDER: ICD-10-CM

## 2017-01-31 DIAGNOSIS — R29.6 RECURRENT FALLS: ICD-10-CM

## 2017-01-31 PROCEDURE — 70551 MRI BRAIN STEM W/O DYE: CPT | Mod: TC,PO

## 2017-01-31 PROCEDURE — 70551 MRI BRAIN STEM W/O DYE: CPT | Mod: 26,,, | Performed by: RADIOLOGY

## 2017-02-01 ENCOUNTER — TELEPHONE (OUTPATIENT)
Dept: NEUROLOGY | Facility: CLINIC | Age: 82
End: 2017-02-01

## 2017-02-01 NOTE — TELEPHONE ENCOUNTER
----- Message from Katelynn Yo MA sent at 1/31/2017  3:13 PM CST -----      ----- Message -----     From: Law Feliciano MD     Sent: 1/31/2017   1:12 PM       To: [facility] Staff    The MRI does not show any changes.  The small meningioma and arachnoid cyst are unchanged.  The other changes are expected with age.

## 2017-02-09 ENCOUNTER — LAB VISIT (OUTPATIENT)
Dept: LAB | Facility: HOSPITAL | Age: 82
End: 2017-02-09
Attending: PSYCHIATRY & NEUROLOGY
Payer: MEDICARE

## 2017-02-09 DIAGNOSIS — H53.9 DISTURBANCES OF VISION DUE TO CEREBROVASCULAR DISEASE: ICD-10-CM

## 2017-02-09 DIAGNOSIS — G93.0 ARACHNOID CYST: ICD-10-CM

## 2017-02-09 DIAGNOSIS — R29.6 RECURRENT FALLS: ICD-10-CM

## 2017-02-09 DIAGNOSIS — I67.9 DISTURBANCES OF VISION DUE TO CEREBROVASCULAR DISEASE: ICD-10-CM

## 2017-02-09 DIAGNOSIS — R51.9 HEADACHE DISORDER: ICD-10-CM

## 2017-02-09 LAB
CREAT SERPL-MCNC: 1.1 MG/DL
EST. GFR  (AFRICAN AMERICAN): 54.4 ML/MIN/1.73 M^2
EST. GFR  (NON AFRICAN AMERICAN): 47.2 ML/MIN/1.73 M^2

## 2017-02-09 PROCEDURE — 36415 COLL VENOUS BLD VENIPUNCTURE: CPT | Mod: PO

## 2017-02-09 PROCEDURE — 82565 ASSAY OF CREATININE: CPT | Mod: 91

## 2017-02-10 RX ORDER — METOPROLOL TARTRATE 50 MG/1
50 TABLET ORAL 2 TIMES DAILY
Qty: 60 TABLET | Refills: 6 | Status: SHIPPED | OUTPATIENT
Start: 2017-02-10 | End: 2017-02-16 | Stop reason: SDUPTHER

## 2017-02-10 RX ORDER — METOPROLOL TARTRATE 50 MG/1
TABLET ORAL
Qty: 60 TABLET | Refills: 0 | Status: SHIPPED | OUTPATIENT
Start: 2017-02-10 | End: 2017-02-10 | Stop reason: SDUPTHER

## 2017-02-10 NOTE — TELEPHONE ENCOUNTER
----- Message from Renay Carranza sent at 2/10/2017 10:38 AM CST -----  Contact: pt  Call pt at [phone]//regading a rx refill of metotrolol 50mg  Call to [facility] at [phone]//pt will be out of medication the weekend//thks ht

## 2017-02-10 NOTE — TELEPHONE ENCOUNTER
metoprolol tartrate (LOPRESSOR) 50 MG tablet  TAKE ONE TABLET BY MOUTH TWICE DAILY       Disp: 60 tablet Refills: 0    Class: Normal Start: 2/10/2017 - 2/10/2017   Approved by: Hasmukh Lopez MD  metoprolol tartrate (LOPRESSOR) 50 MG tablet  Take 1 tablet (50 mg total) by mouth 2 (two) times daily.       Disp: 60 tablet Refills: 6    Class: Normal Start: 2/10/2017   Approved by: Hasmukh Lopez MD  Patient notifeid

## 2017-02-16 ENCOUNTER — OFFICE VISIT (OUTPATIENT)
Dept: CARDIOLOGY | Facility: CLINIC | Age: 82
End: 2017-02-16
Payer: MEDICARE

## 2017-02-16 VITALS
HEART RATE: 60 BPM | HEIGHT: 65 IN | DIASTOLIC BLOOD PRESSURE: 68 MMHG | WEIGHT: 169 LBS | SYSTOLIC BLOOD PRESSURE: 170 MMHG | BODY MASS INDEX: 28.16 KG/M2

## 2017-02-16 DIAGNOSIS — I10 ESSENTIAL HYPERTENSION: Chronic | ICD-10-CM

## 2017-02-16 DIAGNOSIS — I25.10 CORONARY ARTERY DISEASE, OCCLUSIVE: Primary | Chronic | ICD-10-CM

## 2017-02-16 PROCEDURE — 99999 PR PBB SHADOW E&M-EST. PATIENT-LVL III: CPT | Mod: PBBFAC,,, | Performed by: NUCLEAR MEDICINE

## 2017-02-16 PROCEDURE — 3078F DIAST BP <80 MM HG: CPT | Mod: S$GLB,,, | Performed by: NUCLEAR MEDICINE

## 2017-02-16 PROCEDURE — 1160F RVW MEDS BY RX/DR IN RCRD: CPT | Mod: S$GLB,,, | Performed by: NUCLEAR MEDICINE

## 2017-02-16 PROCEDURE — 1157F ADVNC CARE PLAN IN RCRD: CPT | Mod: S$GLB,,, | Performed by: NUCLEAR MEDICINE

## 2017-02-16 PROCEDURE — 1126F AMNT PAIN NOTED NONE PRSNT: CPT | Mod: S$GLB,,, | Performed by: NUCLEAR MEDICINE

## 2017-02-16 PROCEDURE — 1159F MED LIST DOCD IN RCRD: CPT | Mod: S$GLB,,, | Performed by: NUCLEAR MEDICINE

## 2017-02-16 PROCEDURE — 3077F SYST BP >= 140 MM HG: CPT | Mod: S$GLB,,, | Performed by: NUCLEAR MEDICINE

## 2017-02-16 PROCEDURE — 99214 OFFICE O/P EST MOD 30 MIN: CPT | Mod: S$GLB,,, | Performed by: NUCLEAR MEDICINE

## 2017-02-16 RX ORDER — METOPROLOL TARTRATE 50 MG/1
50 TABLET ORAL 3 TIMES DAILY
Qty: 90 TABLET | Refills: 6 | Status: SHIPPED | OUTPATIENT
Start: 2017-02-16 | End: 2017-06-12 | Stop reason: SDUPTHER

## 2017-02-16 RX ORDER — AMLODIPINE BESYLATE 5 MG/1
5 TABLET ORAL DAILY
Qty: 30 TABLET | Refills: 3 | Status: SHIPPED | OUTPATIENT
Start: 2017-02-16 | End: 2017-07-04 | Stop reason: SDUPTHER

## 2017-02-16 NOTE — PROGRESS NOTES
Subjective:   Patient ID:  Loida Holden is a 81 y.o. female who presents for follow-up of Hypertension ( month followup) and Pre-op Exam (Endoscopy )      HPI 1- ESSENTIAL HTN   2- CHRONIC CAD- ANGINA FC 2  SHE WAS TOLD BY HER GI SPECIALIST THAT SHE NEEDS HER SBP BETTER CONTROLLED BEFORE FURTHER GI ENDOSCOPY FOR EVALUATION OF GERD AND GRIMES ESOPHAGUS.  PATIENT DENIES ANY CHEST DISCOMFORT. NO ANGINA EQUIVALENT  NO UNUSUAL HOOKER. NO ORTHOPNEA OR PND  NO PALPITATIONS. NO EXERTIONAL DIZZINESS OR SYNCOPE  NO FOCAL CNS SYMPTOMS OR SIGNS TO SUGGEST TIA OR STROKE  NO EDEMA. NO INTERMITTENT CLAUDICATION    Review of Systems   Constitution: Negative for chills, fever, weakness, night sweats, weight gain and weight loss.   HENT: Negative for headaches and nosebleeds.    Eyes: Negative for blurred vision, double vision and visual disturbance.   Cardiovascular: Negative for chest pain, dyspnea on exertion, irregular heartbeat, leg swelling, orthopnea, palpitations, paroxysmal nocturnal dyspnea and syncope.   Respiratory: Negative for cough, hemoptysis and wheezing.    Endocrine: Negative for polydipsia and polyuria.   Hematologic/Lymphatic: Does not bruise/bleed easily.   Skin: Negative for rash.   Musculoskeletal: Negative for joint pain, joint swelling, muscle weakness and myalgias.   Gastrointestinal: Negative for abdominal pain, hematemesis, jaundice and melena.   Genitourinary: Negative for dysuria, hematuria and nocturia.   Neurological: Negative for dizziness, focal weakness and sensory change.   Psychiatric/Behavioral: Negative for depression. The patient does not have insomnia and is not nervous/anxious.      Family History   Problem Relation Age of Onset    Heart disease Mother     Cancer Mother      breast    Liver disease Mother     Diabetes Maternal Grandfather     Psoriasis Maternal Grandmother     Heart disease Son     Colon cancer Neg Hx     Melanoma Neg Hx     Lupus Neg Hx     Eczema Neg  Hx      Past Medical History   Diagnosis Date    Angina pectoris     Anxiety     Arthritis      neck, back,     Brain cyst      x2    Colon polyp     Coronary artery disease      s/p PTCA    Diabetes type 2, controlled 2/17/2016    Diverticulitis of intestine without hemorrhage 11/10/2015    Dry senile macular degeneration     Ex-smoker 11/10/2015    Fatty liver     Gastritis     Gout     Herpes genitalia     Hiatal hernia     History of shingles     Hx of colonic polyps      8/21/09    Hyperlipidemia     Hypertension     Hypothyroid     Meningioma      6/14 mri dr cuevas    Obesity (BMI 30.0-34.9) 12/2/2015    Osteopenia 4/15 klever 4/17    Pneumonia     Procidentia of rectum 5/31/2012    Recurrent vomiting      gi eval    Renal manifestation of secondary diabetes mellitus     S/P PTCA (percutaneous transluminal coronary angioplasty) 10/23/2013    Seizures      per pt. hx of seizures    Stroke     Tobacco dependence     Type 2 diabetes mellitus with stage 3 chronic kidney disease, without long-term current use of insulin     Type 2 diabetes mellitus without complication      Current Outpatient Prescriptions on File Prior to Visit   Medication Sig Dispense Refill    ACCU-CHEK NANNETTE PLUS METER [facility]       allopurinol (ZYLOPRIM) 100 MG tablet TAKE ONE TABLET BY MOUTH ONCE DAILY 90 tablet 0    BIOTIN ORAL Take 5,000 mcg by mouth once daily.      blood sugar diagnostic (TRUETEST TEST STRIPS) Strp 1 strip by Misc.(Non-Drug; Combo Route) route 3 (three) times daily. True test strips 100 strip 11    hydrochlorothiazide (HYDRODIURIL) 25 MG tablet TAKE ONE TABLET BY MOUTH ONCE DAILY TAKE  ONE  ADDITIONAL  TABLET  A  DAY  FOR  FLUID  RETENTION 60 tablet 0    lancets 33 gauge Misc 1 lancet by Misc.(Non-Drug; Combo Route) route 3 (three) times daily. 100 each 11    levothyroxine (SYNTHROID) 50 MCG tablet Take 1 tablet (50 mcg total) by mouth before breakfast. 90 tablet 2    [DISCONTINUED]  amlodipine (NORVASC) 5 MG tablet Take 0.5 tablets (2.5 mg total) by mouth once daily. 30 tablet 3    [DISCONTINUED] metoprolol tartrate (LOPRESSOR) 50 MG tablet Take 1 tablet (50 mg total) by mouth 2 (two) times daily. 60 tablet 6    sodium,potassium,mag sulfates (SUPREP BOWEL PREP KIT) 17.5-3.13-1.6 gram SolR Take 1 kit by mouth as directed. 1 Bottle 0    walker Misc Use rollator walker to reduce stress on knee 1 each 0    [DISCONTINUED] Al hyd-Mg tr-alg ac-sod bicarb (GAVISCON) 80-14.2 mg Chew Take by mouth.       No current facility-administered medications on file prior to visit.      Review of patient's allergies indicates:   Allergen Reactions    Codeine      Other reaction(s): Unknown    Diprivan [propofol]     Doxycycline      Other reaction(s): Unknown    Iodine and iodide containing products      Other reaction(s): Unknown    Ivp dye  [iodinated contrast media - iv dye]      Other reaction(s): Unknown    Latex, natural rubber Hives    Metronidazole hcl      Other reaction(s): Unknown  Other reaction(s): Unknown    Pantoprazole      Difficulty urinating      Procaine      Other reaction(s): Unknown  Other reaction(s): Unknown    Rosuvastatin      Other reaction(s): liver enlargement  Other reaction(s): liver enlargement    Simvastatin      Other reaction(s): Unknown  Liver problems      Sulfasalazine Other (See Comments)       Objective:     Physical Exam   Constitutional: She is oriented to person, place, and time. She appears well-developed. No distress.   HENT:   Head: Normocephalic.   Eyes: Conjunctivae are normal. Pupils are equal, round, and reactive to light. No scleral icterus.   Neck: Normal range of motion. Neck supple. Normal carotid pulses, no hepatojugular reflux and no JVD present. Carotid bruit is not present. No edema present. No thyroid mass and no thyromegaly present.   Cardiovascular: Normal rate, regular rhythm, S1 normal, S2 normal, normal heart sounds and intact distal  pulses.  PMI is not displaced.  Exam reveals no gallop and no friction rub.    No murmur heard.  Pulses:       Carotid pulses are 2+ on the right side, and 2+ on the left side.       Radial pulses are 2+ on the right side, and 2+ on the left side.        Femoral pulses are 2+ on the right side, and 2+ on the left side.       Popliteal pulses are 2+ on the right side, and 2+ on the left side.        Dorsalis pedis pulses are 2+ on the right side, and 2+ on the left side.        Posterior tibial pulses are 2+ on the right side, and 2+ on the left side.   Pulmonary/Chest: Effort normal and breath sounds normal. She has no wheezes. She has no rales. She exhibits no tenderness.   Abdominal: Soft. Bowel sounds are normal. She exhibits no pulsatile midline mass and no mass. There is no hepatosplenomegaly. There is no tenderness.   Musculoskeletal: Normal range of motion. She exhibits no edema or tenderness.        Cervical back: Normal.        Thoracic back: Normal.        Lumbar back: Normal.   Lymphadenopathy:     She has no cervical adenopathy.     She has no axillary adenopathy.        Right: No supraclavicular adenopathy present.        Left: No supraclavicular adenopathy present.   Neurological: She is alert and oriented to person, place, and time. She has normal strength and normal reflexes. No sensory deficit. Gait normal.   Skin: Skin is warm. No rash noted. No cyanosis. No pallor. Nails show no clubbing.   Psychiatric: She has a normal mood and affect. Her speech is normal and behavior is normal. Cognition and memory are normal.       Assessment:     1. Coronary artery disease, occlusive    2. Essential hypertension      UNCONTROLLED SBP-  GOAL LESS 150 MMHG  AND DBP LESS 80  NO CLINICAL EVIDENCE OF ADHF. NO ACTIVE MYOCARDIAL ISCHEMIA-  NO CARD ARRHYTHMIAS  CNS STATUS STABLE  CARD MED - WELL TOLERATED  Plan:     Coronary artery disease, occlusive    Essential hypertension  -     amlodipine (NORVASC) 5 MG tablet;  Take 1 tablet (5 mg total) by mouth once daily.  Dispense: 30 tablet; Refill: 3    Other orders  -     metoprolol tartrate (LOPRESSOR) 50 MG tablet; Take 1 tablet (50 mg total) by mouth 3 (three) times daily.  Dispense: 90 tablet; Refill: 6      1- OMT ANTI HYPERTENSIVE MEDS    2- HOME BP  MONITORING- AM AND BEDTIME-  PHONE CALL TO REVIEW RESULTS IN 2 WEEKS. AND FURTHER RECOMMENDATIONS    3- RETURN IN 4 MONTHS.

## 2017-02-16 NOTE — MR AVS SNAPSHOT
[facility] - Cardiology  [address]  [place] [address]  Phone: [phone]  Fax: [phone]                  Loida Holden   2017 11:20 AM   Office Visit    Description:  Female : 1935   Provider:  Conrad Hough MD   Department:  [facility] - Cardiology           Reason for Visit     Hypertension     Pre-op Exam           Diagnoses this Visit        Comments    Coronary artery disease, occlusive    -  Primary     Essential hypertension                To Do List           Future Appointments        Provider Department Dept Phone    3/14/2017 11:00 AM Kim Dhillon MD [facility] - Neurology [phone]    2017 9:30 AM FIELDS, VISUAL-ONE [facility] Ophthalmology [phone]    2017 10:15 AM George Bailey MD [facility] Ophthalmology [phone]    2017 11:00 AM Twin Petersen MD [facility] Internal Medicine [phone]      Goals (5 Years of Data)     None      Follow-Up and Disposition     Return in about 4 months (around 2017).       These Medications        Disp Refills Start End    metoprolol tartrate (LOPRESSOR) 50 MG tablet 90 tablet 6 2017     Take 1 tablet (50 mg total) by mouth 3 (three) times daily. - Oral    Pharmacy: [facility] [address] [place], [place] [id number] [facility] Ph #: [phone]       amlodipine (NORVASC) 5 MG tablet 30 tablet 3 2017    Take 1 tablet (5 mg total) by mouth once daily. - Oral    Pharmacy: [facility] [address] [place], [place] [id number] [facility] Ph #: [phone]         [facility]     [facility] On Call Nurse Care Line -  Assistance  Registered nurses in the [facility] Center provide clinical advisement, health education, appointment booking, and other advisory services.  Call for this free service at [phone].             Medications           Message regarding Medications     Verify the changes and/or additions to your medication regime listed below are  the same as discussed with your clinician today.  If any of these changes or additions are incorrect, please notify your healthcare provider.        CHANGE how you are taking these medications     Start Taking Instead of    metoprolol tartrate (LOPRESSOR) 50 MG tablet metoprolol tartrate (LOPRESSOR) 50 MG tablet    Dosage:  Take 1 tablet (50 mg total) by mouth 3 (three) times daily. Dosage:  Take 1 tablet (50 mg total) by mouth 2 (two) times daily.    Reason for Change:  Reorder     amlodipine (NORVASC) 5 MG tablet amlodipine (NORVASC) 5 MG tablet    Dosage:  Take 1 tablet (5 mg total) by mouth once daily. Dosage:  Take 0.5 tablets (2.5 mg total) by mouth once daily.    Reason for Change:  Reorder       STOP taking these medications     Al hyd-Mg tr-alg ac-sod bicarb (GAVISCON) 80-14.2 mg Chew Take by mouth.           Verify that the below list of medications is an accurate representation of the medications you are currently taking.  If none reported, the list may be blank. If incorrect, please contact your healthcare provider. Carry this list with you in case of emergency.           Current Medications     ACCU-CHEK NANNETTE PLUS METER Mis     allopurinol (ZYLOPRIM) 100 MG tablet TAKE ONE TABLET BY MOUTH ONCE DAILY    amlodipine (NORVASC) 5 MG tablet Take 1 tablet (5 mg total) by mouth once daily.    BIOTIN ORAL Take 5,000 mcg by mouth once daily.    blood sugar diagnostic (TRUETEST TEST STRIPS) Strp 1 strip by Misc.(Non-Drug; Combo Route) route 3 (three) times daily. True test strips    hydrochlorothiazide (HYDRODIURIL) 25 MG tablet TAKE ONE TABLET BY MOUTH ONCE DAILY TAKE  ONE  ADDITIONAL  TABLET  A  DAY  FOR  FLUID  RETENTION    lancets 33 gauge Misc 1 lancet by Misc.(Non-Drug; Combo Route) route 3 (three) times daily.    levothyroxine (SYNTHROID) 50 MCG tablet Take 1 tablet (50 mcg total) by mouth before breakfast.    metoprolol tartrate (LOPRESSOR) 50 MG tablet Take 1 tablet (50 mg total) by mouth 3 (three)  "times daily.    sodium,potassium,mag sulfates (SUPREP BOWEL PREP KIT) 17.5-3.13-1.6 gram SolR Take 1 kit by mouth as directed.    walker Misc Use rollator walker to reduce stress on knee           Clinical Reference Information           Your Vitals Were     BP Pulse Height Weight BMI    170/68 (BP Location: Left arm, Patient Position: Sitting, BP Method: Manual) 60 5' 5" (1.651 m) 76.7 kg (169 lb) 28.12 kg/m2      Blood Pressure          Most Recent Value    BP  (!)  170/68      Allergies as of 2/16/2017     Codeine    Diprivan [Propofol]    Doxycycline    Iodine And Iodide Containing Products    Ivp Dye  [Iodinated Contrast Media - Iv Dye]    Latex, Natural Rubber    Metronidazole Hcl    Pantoprazole    Procaine    Rosuvastatin    Simvastatin    Sulfasalazine      Immunizations Administered on Date of Encounter - 2/16/2017     None      Language Assistance Services     ATTENTION: Language assistance services are available, free of charge. Please call [phone].      ATENCIÓN: Si habla leeann, tiene a church disposición servicios gratuitos de asistencia lingüística. Llame al [phone].     [facility] Ý: N?u b?n nói Ti?ng Vi?t, có các d?ch v? h? tr? ngôn ng? mi?n phí dành cho b?n. G?i s? [phone].         [facility] - Cardiology complies with applicable Federal civil rights laws and does not discriminate on the basis of race, color, national origin, age, disability, or sex.        "

## 2017-03-14 ENCOUNTER — OFFICE VISIT (OUTPATIENT)
Dept: NEUROLOGY | Facility: CLINIC | Age: 82
End: 2017-03-14
Payer: MEDICARE

## 2017-03-14 VITALS
WEIGHT: 173.06 LBS | BODY MASS INDEX: 29.55 KG/M2 | DIASTOLIC BLOOD PRESSURE: 64 MMHG | SYSTOLIC BLOOD PRESSURE: 132 MMHG | HEART RATE: 60 BPM | HEIGHT: 64 IN

## 2017-03-14 DIAGNOSIS — H81.10 BPV (BENIGN POSITIONAL VERTIGO), UNSPECIFIED LATERALITY: ICD-10-CM

## 2017-03-14 DIAGNOSIS — G93.0 ARACHNOID CYST: Primary | ICD-10-CM

## 2017-03-14 DIAGNOSIS — R29.6 RECURRENT FALLS: ICD-10-CM

## 2017-03-14 PROCEDURE — 99999 PR PBB SHADOW E&M-EST. PATIENT-LVL III: CPT | Mod: PBBFAC,,, | Performed by: PSYCHIATRY & NEUROLOGY

## 2017-03-14 PROCEDURE — 1160F RVW MEDS BY RX/DR IN RCRD: CPT | Mod: S$GLB,,, | Performed by: PSYCHIATRY & NEUROLOGY

## 2017-03-14 PROCEDURE — 3075F SYST BP GE 130 - 139MM HG: CPT | Mod: S$GLB,,, | Performed by: PSYCHIATRY & NEUROLOGY

## 2017-03-14 PROCEDURE — 1126F AMNT PAIN NOTED NONE PRSNT: CPT | Mod: S$GLB,,, | Performed by: PSYCHIATRY & NEUROLOGY

## 2017-03-14 PROCEDURE — 3078F DIAST BP <80 MM HG: CPT | Mod: S$GLB,,, | Performed by: PSYCHIATRY & NEUROLOGY

## 2017-03-14 PROCEDURE — 1159F MED LIST DOCD IN RCRD: CPT | Mod: S$GLB,,, | Performed by: PSYCHIATRY & NEUROLOGY

## 2017-03-14 PROCEDURE — 99215 OFFICE O/P EST HI 40 MIN: CPT | Mod: S$GLB,,, | Performed by: PSYCHIATRY & NEUROLOGY

## 2017-03-14 PROCEDURE — 1157F ADVNC CARE PLAN IN RCRD: CPT | Mod: S$GLB,,, | Performed by: PSYCHIATRY & NEUROLOGY

## 2017-03-14 NOTE — PROGRESS NOTES
Progress note  Neurology      Neurology follow up:  For: Arachnoid cyst  Vertigo  And headache  Recurrent falls     SUBJECTIVE:      HPI:   No fall since October and no vertigo also.    Past Medical History   Diagnosis Date    Hypertension     Hyperlipidemia     Arthritis     Hiatal hernia     Gastritis     Coronary artery disease      s/p PTCA    Brain cyst      x2    S/P PTCA (percutaneous transluminal coronary angioplasty) 10/23/2013    Hypothyroid     History of shingles     Gout     Recurrent vomiting      gi eval    Dry senile macular degeneration     Colon polyp     Meningioma      6/14 mri dr cuevas    Brain tumor      per patient    Osteopenia 4/15 klever 4/17     Past Surgical History   Procedure Laterality Date    Cholecystectomy       2/2012    Cardiac catheterization       [facility] 2-MRI safe to 3T    Coronary angioplasty      Cataract extraction w/  intraocular lens implant  OD w/YAG    Cataract extraction w/  intraocular lens implant  OS w/YAG    Hysterectomy       Family History   Problem Relation Age of Onset    Heart disease Mother     Cancer Mother      breast    Heart disease Father     Diabetes Maternal Grandfather     Colon cancer Neg Hx     Melanoma Neg Hx     Lupus Neg Hx     Eczema Neg Hx     Psoriasis Maternal Grandmother      History   Substance Use Topics    Smoking status: Former Smoker     Quit date: 01/01/2002    Smokeless tobacco: Never Used    Alcohol Use: No       Allergies   Allergen Reactions    Codeine      Other reaction(s): Unknown    Doxycycline      Other reaction(s): Unknown    Iodine And Iodide Containing Products      Other reaction(s): Unknown    Ivp Dye  [Iodinated Contrast Media - Iv Dye]      Other reaction(s): Unknown    Latex, Natural Rubber Hives    Metronidazole Hcl      Other reaction(s): Unknown  Other reaction(s): Unknown    Pantoprazole      Difficulty urinating      Procaine      Other reaction(s): Unknown  Other  reaction(s): Unknown    Rosuvastatin      Other reaction(s): liver enlargement  Other reaction(s): liver enlargement    Simvastatin      Other reaction(s): Unknown  Liver problems      Sulfasalazine Other (See Comments)      Patient Active Problem List   Diagnosis    Hypertension    Hyperlipidemia    Arthritis    Hiatal hernia    Coronary artery disease    Herpes genitalia    Brain cyst    S/P PTCA (percutaneous transluminal coronary angioplasty)    Hypothyroid    Gout    Dermatochalasis - Both Eyes    Lens replaced by other means - Both Eyes    Macular degeneration - Both Eyes    Refractive error - Both Eyes    Cervical spondylosis    Recurrent falls    Procidentia of rectum    Osteopenia         Review of Systems   Constitutional: Negative for fever and weight loss.   HENT: Negative for hearing loss.    Eyes: Positive for blurred vision. Negative for double vision, photophobia and pain.   Respiratory: Negative for cough.    Cardiovascular: Negative for chest pain.   Gastrointestinal: Negative for abdominal pain, nausea and vomiting.   Genitourinary: Negative for dysuria, frequency and urgency.   Musculoskeletal: Positive for falls. Negative for back pain, joint pain, myalgias and neck pain.   Skin: Negative for itching and rash.   Neurological: Negative for tingling and headaches.   Psychiatric/Behavioral: Negative for depression and memory loss.           OBJECTIVE:       Physical Exam   Constitutional: She is oriented to person, place, and time. She appears well-developed and well-nourished.   HENT:   Head: Normocephalic and atraumatic.   Eyes: Conjunctivae and EOM are normal. Pupils are equal, round, and reactive to light.   Neck: Normal range of motion. Neck supple. No JVD present. No tracheal deviation present. No thyromegaly present.   Cardiovascular: Normal rate, regular rhythm and normal heart sounds.    Pulmonary/Chest: Effort normal and breath sounds normal.   Abdominal: She exhibits  no distension. There is no tenderness.   Musculoskeletal: Normal range of motion. She exhibits no edema or tenderness.   Neurological: She is alert and oriented to person, place, and time. She has normal strength and normal reflexes. She displays normal reflexes. No cranial nerve deficit or sensory deficit. She exhibits normal muscle tone. She displays a negative Romberg sign. Coordination and gait normal.   Reflex Scores:       Tricep reflexes are 2+ on the right side and 2+ on the left side.       Bicep reflexes are 2+ on the right side and 2+ on the left side.       Brachioradialis reflexes are 2+ on the right side and 2+ on the left side.       Patellar reflexes are 2+ on the right side and 2+ on the left side.       Achilles reflexes are 2+ on the right side and 2+ on the left side.  Skin: Skin is warm and dry. No rash noted.   Psychiatric: She has a normal mood and affect. Her speech is normal and behavior is normal. Judgment and thought content normal. Cognition and memory are normal.   She feels dizzy when she bends forward and gets up.       Diagnostic studies:                             MRI of the brain: 9/17/2015  Impression             Unchanged small anterior right middle cranial fossa meningioma.    Unchanged probable arachnoid cyst in the anterior left middle cranial fossa.    Mild degree of chronic white matter signal changes likely representing sequela of microvascular ischemic disease, unchanged from prior.    No evidence of acute infarction or other acute intracranial findings.         MRI of C-spine; 9/17/2015      Impression        Multilevel degenerative disease and facet arthropathy as detailed above.  Findings appear similar to prior and or most pronounced at the level of C5-6.    Mild grade 1 anterolisthesis of C6 on C7 is unchanged in             ASSESSMENT/PLAN:     Assessment:     1. Headache: tension headache , no headache now and she is stable.  2.Vertigo : May be peripheral but  central cause needs to be ruled out. Recent fall , may be anesthesia effect after the colonoscopy.  3. Moderate to severe cervical spondylosis .  4.Multiple  Arachnoid cyst in the brain.  5.  Recurrent falls and vision disturbances rule out tumor effect.     Patient Active Problem List   Diagnosis    Hypertension    Hyperlipidemia    Arthritis    Hiatal hernia    Coronary artery disease    Herpes genitalia    Brain cyst    S/P PTCA (percutaneous transluminal coronary angioplasty)    Hypothyroid    Gout    Dermatochalasis - Both Eyes    Lens replaced by other means - Both Eyes    Macular degeneration - Both Eyes    Refractive error - Both Eyes    Cervical spondylosis    Recurrent falls    Procidentia of rectum    Osteopenia         Plan:    No new medication.  Time spent: 30 minutes in face to face discussion concerning diagnosis, prognosis, review of lab and test results, benefits of treatment as well as management of disease, counseling of patient and coordination of care between various health care providers . Greater than half the time spent was used for coordination of care and counseling of patient.

## 2017-03-14 NOTE — MR AVS SNAPSHOT
[facility] - Neurology  [address] [facility] [address] [place] [address]  Phone: [phone]                  Loida Holden   3/14/2017 11:00 AM   Office Visit    Description:  Female : 1935   Provider:  Kim Dhillon MD   Department:  [facility] - Neurology           Reason for Visit     Follow-up                To Do List           Future Appointments        Provider Department Dept Phone    2017 9:30 AM FIELDS, VISUAL-ONE [facility] - Ophthalmology [phone]    2017 10:15 AM George Bailey MD [facility] Ophthalmology [phone]    2017 11:00 AM Twin Petersen MD [facility] - Internal Medicine [phone]    2017 10:00 AM Conrad Hough MD [facility] - Cardiology [phone]    2017 11:00 AM Kim Dhillon MD [facility] Neurology [phone]      Goals (5 Years of Data)     None      Follow-Up and Disposition     Return in about 6 months (around 2017).      [facility]     [facility] On Call Nurse Care Line -  Assistance  Registered nurses in the [facility] On Call Center provide clinical advisement, health education, appointment booking, and other advisory services.  Call for this free service at [phone].             Medications           Message regarding Medications     Verify the changes and/or additions to your medication regime listed below are the same as discussed with your clinician today.  If any of these changes or additions are incorrect, please notify your healthcare provider.             Verify that the below list of medications is an accurate representation of the medications you are currently taking.  If none reported, the list may be blank. If incorrect, please contact your healthcare provider. Carry this list with you in case of emergency.           Current Medications     ACCU-CHEK NANNETTE PLUS METER Misc     allopurinol (ZYLOPRIM) 100 MG tablet TAKE ONE TABLET BY MOUTH ONCE DAILY    amlodipine (NORVASC) 5 MG tablet Take 1 tablet (5 mg total) by  "mouth once daily.    BIOTIN ORAL Take 5,000 mcg by mouth once daily.    blood sugar diagnostic (TRUETEST TEST STRIPS) Strp 1 strip by Misc.(Non-Drug; Combo Route) route 3 (three) times daily. True test strips    hydrochlorothiazide (HYDRODIURIL) 25 MG tablet TAKE ONE TABLET BY MOUTH ONCE DAILY TAKE  ONE  ADDITIONAL  TABLET  A  DAY  FOR  FLUID  RETENTION    lancets 33 gauge Misc 1 lancet by Misc.(Non-Drug; Combo Route) route 3 (three) times daily.    levothyroxine (SYNTHROID) 50 MCG tablet Take 1 tablet (50 mcg total) by mouth before breakfast.    metoprolol tartrate (LOPRESSOR) 50 MG tablet Take 1 tablet (50 mg total) by mouth 3 (three) times daily.    sodium,potassium,mag sulfates (SUPREP BOWEL PREP KIT) 17.5-3.13-1.6 gram SolR Take 1 kit by mouth as directed.    walker Misc Use rollator walker to reduce stress on knee           Clinical Reference Information           Your Vitals Were     BP Pulse Height Weight BMI    132/64 60 5' 4" (1.626 m) 78.5 kg (173 lb 1 oz) 29.71 kg/m2      Blood Pressure          Most Recent Value    BP  132/64      Allergies as of 3/14/2017     Codeine    Diprivan [Propofol]    Doxycycline    Iodine And Iodide Containing Products    Ivp Dye  [Iodinated Contrast Media - Iv Dye]    Latex, Natural Rubber    Metronidazole Hcl    Pantoprazole    Procaine    Rosuvastatin    Simvastatin    Sulfasalazine      Immunizations Administered on Date of Encounter - 3/14/2017     None      Language Assistance Services     ATTENTION: Language assistance services are available, free of charge. Please call [phone].      ATENCIÓN: Si virginiala leeann, tiene a church disposición servicios gratuitos de asistencia lingüística. Llame al [phone].     [facility] Ý: N?u b?n nói Ti?ng Vi?t, có các d?ch v? h? tr? ngôn ng? mi?n phí dành cho b?n. G?i s? [phone].         [facility] - Neurology complies with applicable Federal civil rights laws and does not discriminate on the basis of race, color, national origin, " age, disability, or sex.

## 2017-03-20 DIAGNOSIS — I10 ESSENTIAL HYPERTENSION: ICD-10-CM

## 2017-03-21 RX ORDER — HYDROCHLOROTHIAZIDE 25 MG/1
TABLET ORAL
Qty: 60 TABLET | Refills: 0 | Status: SHIPPED | OUTPATIENT
Start: 2017-03-21 | End: 2017-04-25 | Stop reason: SDUPTHER

## 2017-03-24 RX ORDER — ALLOPURINOL 100 MG/1
TABLET ORAL
Qty: 90 TABLET | Refills: 0 | Status: SHIPPED | OUTPATIENT
Start: 2017-03-24 | End: 2017-06-19 | Stop reason: SDUPTHER

## 2017-04-06 ENCOUNTER — OFFICE VISIT (OUTPATIENT)
Dept: OPHTHALMOLOGY | Facility: CLINIC | Age: 82
End: 2017-04-06
Payer: MEDICARE

## 2017-04-06 DIAGNOSIS — Z96.1 PSEUDOPHAKIA: ICD-10-CM

## 2017-04-06 DIAGNOSIS — H53.2 DIPLOPIA: ICD-10-CM

## 2017-04-06 DIAGNOSIS — D49.6 BRAIN TUMOR: ICD-10-CM

## 2017-04-06 DIAGNOSIS — H54.62 VISION LOSS OF LEFT EYE: ICD-10-CM

## 2017-04-06 DIAGNOSIS — E11.9 DIABETES MELLITUS TYPE 2 WITHOUT RETINOPATHY: ICD-10-CM

## 2017-04-06 DIAGNOSIS — H35.3190 NONEXUDATIVE SENILE MACULAR DEGENERATION OF RETINA: Primary | ICD-10-CM

## 2017-04-06 DIAGNOSIS — D32.0 CEREBRAL MENINGIOMA: Primary | ICD-10-CM

## 2017-04-06 DIAGNOSIS — G93.0 ARACHNOID CYST: ICD-10-CM

## 2017-04-06 DIAGNOSIS — D32.9 MENINGIOMA: ICD-10-CM

## 2017-04-06 DIAGNOSIS — H52.7 REFRACTIVE ERROR: ICD-10-CM

## 2017-04-06 PROCEDURE — 92015 DETERMINE REFRACTIVE STATE: CPT | Mod: S$GLB,,, | Performed by: OPHTHALMOLOGY

## 2017-04-06 PROCEDURE — 99999 PR PBB SHADOW E&M-EST. PATIENT-LVL II: CPT | Mod: PBBFAC,,, | Performed by: OPHTHALMOLOGY

## 2017-04-06 PROCEDURE — 92014 COMPRE OPH EXAM EST PT 1/>: CPT | Mod: S$GLB,,, | Performed by: OPHTHALMOLOGY

## 2017-04-06 PROCEDURE — 92083 EXTENDED VISUAL FIELD XM: CPT | Mod: S$GLB,,, | Performed by: OPHTHALMOLOGY

## 2017-04-06 NOTE — PROGRESS NOTES
SUBJECTIVE:   Loida Holden is a 81 y.o. female   Uncorrected distance visual acuity was J1 in the right eye and 20/25 +2 in the left eye.   Chief Complaint   Patient presents with    Macular Degeneration     1 yr NVF/FD, no drops        HPI:  HPI     Macular Degeneration    Additional comments: 1 yr NVF/FD, no drops           Comments   Pt states she's been having some double vision in the mornings when she   wake up, goes away, but she's concerned about that. Also states she has 2   cysts on the left side of her brain that have grown from 5cm to 11-12cm   over 15 years. Sometimes she feel like the left side of her head is tense,   like a shayna horse in the head when she strain her eyes. Not using any   drops. No pain in the eyes.     1. Dry AMD   2. PCIOL OU  YAG OU  3. Monovision OD near/OS distance  4. Brain Tumor Dx 7/14   5. Diet controlled DM       Last edited by Jacob Foster on 4/6/2017 10:53 AM. (History)        Assessment /Plan :  1. Nonexudative senile macular degeneration of retina Stable will cont to follow with Yearly DFE   2. Diabetes mellitus type 2 without retinopathy No diabetic retinopathy at this time. Reviewed diabetic eye precautions including avoiding tobacco use,  Good glucose control, and importance of regular follow up.      3. Pseudophakia Stable   4. Brain tumors -   Meningioma and arachnoid cyst with suspicion of VF progression OU. No definite pattern of loss and some variability in reliability present, but concerning in degree of change and pt feels like she has subjectively seen VF changes OS. I called Dr Dhillon today to discuss results and he will contact her for further review.   5. Diplopia    6. Refractive error pt elects SVL RX       RTC in 1 year or prn any changes

## 2017-04-07 DIAGNOSIS — H54.7 VISION LOSS: ICD-10-CM

## 2017-04-07 DIAGNOSIS — D32.0 CEREBRAL MENINGIOMA: Primary | ICD-10-CM

## 2017-04-21 ENCOUNTER — TELEPHONE (OUTPATIENT)
Dept: NEUROLOGY | Facility: CLINIC | Age: 82
End: 2017-04-21

## 2017-04-21 NOTE — TELEPHONE ENCOUNTER
Called patient to schedule appt with neurosurgeon. Patient states that she really does not want to go. Patient stated that she will think about it and will call to make appt herself if she decides to go. Phone number to dept given.

## 2017-04-25 ENCOUNTER — LAB VISIT (OUTPATIENT)
Dept: LAB | Facility: HOSPITAL | Age: 82
End: 2017-04-25
Attending: FAMILY MEDICINE
Payer: MEDICARE

## 2017-04-25 ENCOUNTER — OFFICE VISIT (OUTPATIENT)
Dept: INTERNAL MEDICINE | Facility: CLINIC | Age: 82
End: 2017-04-25
Payer: MEDICARE

## 2017-04-25 VITALS
WEIGHT: 169.75 LBS | BODY MASS INDEX: 28.98 KG/M2 | DIASTOLIC BLOOD PRESSURE: 70 MMHG | TEMPERATURE: 97 F | HEART RATE: 64 BPM | HEIGHT: 64 IN | SYSTOLIC BLOOD PRESSURE: 154 MMHG

## 2017-04-25 DIAGNOSIS — E03.1 CONGENITAL HYPOTHYROIDISM WITHOUT GOITER: Chronic | ICD-10-CM

## 2017-04-25 DIAGNOSIS — E11.22 TYPE 2 DIABETES MELLITUS WITH STAGE 3 CHRONIC KIDNEY DISEASE, WITHOUT LONG-TERM CURRENT USE OF INSULIN: Primary | ICD-10-CM

## 2017-04-25 DIAGNOSIS — I10 ESSENTIAL HYPERTENSION: Chronic | ICD-10-CM

## 2017-04-25 DIAGNOSIS — K21.9 GASTROESOPHAGEAL REFLUX DISEASE, ESOPHAGITIS PRESENCE NOT SPECIFIED: ICD-10-CM

## 2017-04-25 DIAGNOSIS — N18.30 TYPE 2 DIABETES MELLITUS WITH STAGE 3 CHRONIC KIDNEY DISEASE, WITHOUT LONG-TERM CURRENT USE OF INSULIN: Primary | ICD-10-CM

## 2017-04-25 LAB
ALBUMIN SERPL BCP-MCNC: 4.1 G/DL
ALP SERPL-CCNC: 80 U/L
ALT SERPL W/O P-5'-P-CCNC: 23 U/L
ANION GAP SERPL CALC-SCNC: 12 MMOL/L
AST SERPL-CCNC: 28 U/L
BILIRUB SERPL-MCNC: 0.6 MG/DL
BUN SERPL-MCNC: 22 MG/DL
CALCIUM SERPL-MCNC: 10 MG/DL
CHLORIDE SERPL-SCNC: 98 MMOL/L
CO2 SERPL-SCNC: 30 MMOL/L
CREAT SERPL-MCNC: 1.2 MG/DL
EST. GFR  (AFRICAN AMERICAN): 49 ML/MIN/1.73 M^2
EST. GFR  (NON AFRICAN AMERICAN): 42.5 ML/MIN/1.73 M^2
GLUCOSE SERPL-MCNC: 109 MG/DL
POTASSIUM SERPL-SCNC: 3.1 MMOL/L
PROT SERPL-MCNC: 7.7 G/DL
SODIUM SERPL-SCNC: 140 MMOL/L

## 2017-04-25 PROCEDURE — 1157F ADVNC CARE PLAN IN RCRD: CPT | Mod: 8P,S$GLB,, | Performed by: FAMILY MEDICINE

## 2017-04-25 PROCEDURE — 3077F SYST BP >= 140 MM HG: CPT | Mod: S$GLB,,, | Performed by: FAMILY MEDICINE

## 2017-04-25 PROCEDURE — 99214 OFFICE O/P EST MOD 30 MIN: CPT | Mod: S$GLB,,, | Performed by: FAMILY MEDICINE

## 2017-04-25 PROCEDURE — 80053 COMPREHEN METABOLIC PANEL: CPT

## 2017-04-25 PROCEDURE — 36415 COLL VENOUS BLD VENIPUNCTURE: CPT | Mod: PO

## 2017-04-25 PROCEDURE — 99999 PR PBB SHADOW E&M-EST. PATIENT-LVL III: CPT | Mod: PBBFAC,,, | Performed by: FAMILY MEDICINE

## 2017-04-25 PROCEDURE — 1160F RVW MEDS BY RX/DR IN RCRD: CPT | Mod: S$GLB,,, | Performed by: FAMILY MEDICINE

## 2017-04-25 PROCEDURE — 3078F DIAST BP <80 MM HG: CPT | Mod: S$GLB,,, | Performed by: FAMILY MEDICINE

## 2017-04-25 PROCEDURE — 1159F MED LIST DOCD IN RCRD: CPT | Mod: S$GLB,,, | Performed by: FAMILY MEDICINE

## 2017-04-25 RX ORDER — PANTOPRAZOLE SODIUM 40 MG/1
TABLET, DELAYED RELEASE ORAL
COMMUNITY
Start: 2017-04-20

## 2017-04-25 RX ORDER — HYDROCHLOROTHIAZIDE 25 MG/1
TABLET ORAL
Qty: 90 TABLET | Refills: 3 | Status: SHIPPED | OUTPATIENT
Start: 2017-04-25 | End: 2017-06-12

## 2017-04-25 RX ORDER — FUROSEMIDE 40 MG/1
40 TABLET ORAL 2 TIMES DAILY
Qty: 90 TABLET | Refills: 1 | Status: SHIPPED | OUTPATIENT
Start: 2017-04-25 | End: 2017-10-23

## 2017-04-25 RX ORDER — LANCETS
EACH MISCELLANEOUS
COMMUNITY
Start: 2017-04-20

## 2017-04-25 NOTE — PROGRESS NOTES
Subjective:       Patient ID: Loida Holden is a 81 y.o. female.    Chief Complaint: Follow-up    HPI Comments: F/U:       Pt is a 81 year old controlled DM with appetite. Pt has several ongoing work-up issues from GERD to visual changes. Pt has meningioma and acrachnoid cyst. The meningioma has been unchanged as comment on stability of arachnoid cyst.    Review of Systems   Constitutional: Negative.    Respiratory: Negative.    Cardiovascular: Negative.    Genitourinary: Negative.    Neurological: Negative.    Hematological: Negative.    Psychiatric/Behavioral: Negative.        Objective:      Physical Exam   Constitutional: She is oriented to person, place, and time. She appears well-developed and well-nourished.   Cardiovascular: Normal rate and regular rhythm.    Pulmonary/Chest: Effort normal and breath sounds normal.   Abdominal: Soft. Bowel sounds are normal.   Neurological: She is alert and oriented to person, place, and time.   Skin: Skin is warm and dry.   Psychiatric: She has a normal mood and affect. Her behavior is normal.       Assessment:       1. Type 2 diabetes mellitus with stage 3 chronic kidney disease, without long-term current use of insulin    2. Congenital hypothyroidism without goiter    3. Gastroesophageal reflux disease, esophagitis presence not specified    4. Essential hypertension        Plan:       Type 2 diabetes mellitus with stage 3 chronic kidney disease, without long-term current use of insulin  Comments:  PT A1C is stable    Congenital hypothyroidism without goiter  Comments:  Thyroid is good and stable    Gastroesophageal reflux disease, esophagitis presence not specified  Comments:  Will recommend follow-up with GI    Essential hypertension  Comments:  Will continue with the HCTZ  Orders:  -     hydrochlorothiazide (HYDRODIURIL) 25 MG tablet; TAKE ONE TABLET BY MOUTH ONCE DAILY.  Dispense: 90 tablet; Refill: 3  -     Comprehensive metabolic panel; Future; Expected  date: 4/25/17    Other orders  -     furosemide (LASIX) 40 MG tablet; Take 1 tablet (40 mg total) by mouth 2 (two) times daily.  Dispense: 90 tablet; Refill: 1

## 2017-04-25 NOTE — MR AVS SNAPSHOT
[facility] Internal Medicine  [address] [facility] [place] [address]  Phone: [phone]  Fax: [phone]                  Loida Holden   2017 11:00 AM   Office Visit    Description:  Female : 1935   Provider:  Twin Petersen MD   Department:  [facility] - Internal Medicine           Reason for Visit     Follow-up           Diagnoses this Visit        Comments    Type 2 diabetes mellitus with stage 3 chronic kidney disease, without long-term current use of insulin    -  Primary PT A1C is stable    Congenital hypothyroidism without goiter     Thyroid is good and stable    Gastroesophageal reflux disease, esophagitis presence not specified     Will recommend follow-up with GI    Essential hypertension     Will continue with the HCTZ           To Do List           Future Appointments        Provider Department Dept Phone    2017 12:45 PM LABORATORY, [facility] [phone]    2017 10:00 AM Conrad Hough MD [facility] Cardiology [phone]    2017 11:00 AM Kim Dhillon MD [facility] Neurology [phone]      Goals (5 Years of Data)     None       These Medications        Disp Refills Start End    hydrochlorothiazide (HYDRODIURIL) 25 MG tablet 90 tablet 3 2017     TAKE ONE TABLET BY MOUTH ONCE DAILY.    Pharmacy: [facility] [address] [place] [address] Ph #: [phone]       furosemide (LASIX) 40 MG tablet 90 tablet 1 2017    Take 1 tablet (40 mg total) by mouth 2 (two) times daily. - Oral    Pharmacy: [facility] [address] [place] [address] Ph #: [phone]         [facility]     [facility] Nurse Care [facility] - 24/ Assistance  Unless otherwise directed by your provider, please contact [facility], our nurse care line that is available for 24/ assistance.     Registered nurses in the [facility] provide: appointment scheduling, clinical  advisement, health education, and other advisory services.  Call: [phone] (toll free)               Medications           Message regarding Medications     Verify the changes and/or additions to your medication regime listed below are the same as discussed with your clinician today.  If any of these changes or additions are incorrect, please notify your healthcare provider.        START taking these NEW medications        Refills    furosemide (LASIX) 40 MG tablet 1    Sig: Take 1 tablet (40 mg total) by mouth 2 (two) times daily.    Class: Normal    Route: Oral      CHANGE how you are taking these medications     Start Taking Instead of    hydrochlorothiazide (HYDRODIURIL) 25 MG tablet hydrochlorothiazide (HYDRODIURIL) 25 MG tablet    Dosage:  TAKE ONE TABLET BY MOUTH ONCE DAILY. Dosage:  TAKE ONE TABLET BY MOUTH ONCE DAILY. TAKE  ONE  ADDITIONAL  TABLET  A  DAY  FOR  FLUID  RETENTION    Reason for Change:  Reorder       STOP taking these medications     sodium,potassium,mag sulfates (SUPREP BOWEL PREP KIT) 17.5-3.13-1.6 gram SolR Take 1 kit by mouth as directed.           Verify that the below list of medications is an accurate representation of the medications you are currently taking.  If none reported, the list may be blank. If incorrect, please contact your healthcare provider. Carry this list with you in case of emergency.           Current Medications     ACCU-CHEK NANNETTE PLUS METER [facility]     ACCU-CHEK SOFTCLIX LANCETS [facility]     allopurinol (ZYLOPRIM) 100 MG tablet TAKE ONE TABLET BY MOUTH ONCE DAILY    amlodipine (NORVASC) 5 MG tablet Take 1 tablet (5 mg total) by mouth once daily.    BIOTIN ORAL Take 5,000 mcg by mouth once daily.    blood sugar diagnostic (TRUETEST TEST STRIPS) Strp 1 strip by Misc.(Non-Drug; Combo Route) route 3 (three) times daily. True test strips    hydrochlorothiazide (HYDRODIURIL) 25 MG tablet TAKE ONE TABLET BY MOUTH ONCE DAILY.    lancets 33 gauge [facility] 1 lancet by  "Misc.(Non-Drug; Combo Route) route 3 (three) times daily.    levothyroxine (SYNTHROID) 50 MCG tablet Take 1 tablet (50 mcg total) by mouth before breakfast.    metoprolol tartrate (LOPRESSOR) 50 MG tablet Take 1 tablet (50 mg total) by mouth 3 (three) times daily.    pantoprazole (PROTONIX) 40 MG tablet     walker Misc Use rollator walker to reduce stress on knee    furosemide (LASIX) 40 MG tablet Take 1 tablet (40 mg total) by mouth 2 (two) times daily.           Clinical Reference Information           Your Vitals Were     BP Pulse Temp    154/70 (BP Location: Right arm, Patient Position: Sitting, BP Method: Manual) 64 97.2 °F (36.2 °C) (Tympanic)    Height Weight BMI    5' 4" (1.626 m) 77 kg (169 lb 12.1 oz) 29.14 kg/m2      Blood Pressure          Most Recent Value    BP  (!)  154/70      Allergies as of 4/25/2017     Codeine    Diprivan [Propofol]    Doxycycline    Iodine And Iodide Containing Products    Ivp Dye  [Iodinated Contrast Media - Oral And Iv Dye]    Latex, Natural Rubber    Metronidazole Hcl    Pantoprazole    Procaine    Propofol Analogues    Rosuvastatin    Simvastatin    Sulfasalazine      Immunizations Administered on Date of Encounter - 4/25/2017     None      Orders Placed During Today's Visit     [facility]    Comprehensive metabolic panel  4/25/2017 6/24/2018      Language Assistance Services     ATTENTION: Language assistance services are available, free of charge. Please call [phone].      ATENCIÓN: Si habla español, tiene a church disposición servicios gratuitos de asistencia lingüística. Llame al [phone].     [facility] Ý: N?u b?n nói Ti?ng Vi?t, có các d?ch v? h? tr? ngôn ng? mi?n phí dành cho b?n. G?i s? [phone].         [facility] complies with applicable Federal civil rights laws and does not discriminate on the basis of race, color, national origin, age, disability, or sex.        "

## 2017-04-26 DIAGNOSIS — E87.6 HYPOKALEMIA: Primary | ICD-10-CM

## 2017-04-26 RX ORDER — POTASSIUM CHLORIDE 20 MEQ/1
20 TABLET, EXTENDED RELEASE ORAL 2 TIMES DAILY
Qty: 60 TABLET | Refills: 1 | Status: SHIPPED | OUTPATIENT
Start: 2017-04-26

## 2017-04-28 RX ORDER — BETAMETHASONE VALERATE 1.2 MG/G
CREAM TOPICAL
Qty: 45 G | Refills: 0 | Status: SHIPPED | OUTPATIENT
Start: 2017-04-28

## 2017-05-10 ENCOUNTER — LAB VISIT (OUTPATIENT)
Dept: LAB | Facility: HOSPITAL | Age: 82
End: 2017-05-10
Attending: FAMILY MEDICINE
Payer: MEDICARE

## 2017-05-10 DIAGNOSIS — E87.6 HYPOKALEMIA: ICD-10-CM

## 2017-05-10 LAB — POTASSIUM SERPL-SCNC: 3.7 MMOL/L

## 2017-05-10 PROCEDURE — 84132 ASSAY OF SERUM POTASSIUM: CPT

## 2017-05-10 PROCEDURE — 36415 COLL VENOUS BLD VENIPUNCTURE: CPT | Mod: PO

## 2017-05-30 RX ORDER — LEVOTHYROXINE SODIUM 50 UG/1
TABLET ORAL
Qty: 30 TABLET | Refills: 0 | Status: SHIPPED | OUTPATIENT
Start: 2017-05-30 | End: 2017-07-16 | Stop reason: SDUPTHER

## 2017-06-12 ENCOUNTER — OFFICE VISIT (OUTPATIENT)
Dept: CARDIOLOGY | Facility: CLINIC | Age: 82
End: 2017-06-12
Payer: MEDICARE

## 2017-06-12 VITALS
HEART RATE: 64 BPM | SYSTOLIC BLOOD PRESSURE: 152 MMHG | BODY MASS INDEX: 29.47 KG/M2 | WEIGHT: 172.63 LBS | DIASTOLIC BLOOD PRESSURE: 70 MMHG | HEIGHT: 64 IN

## 2017-06-12 DIAGNOSIS — I25.10 CORONARY ARTERY DISEASE, OCCLUSIVE: Primary | Chronic | ICD-10-CM

## 2017-06-12 DIAGNOSIS — I10 ESSENTIAL HYPERTENSION: Chronic | ICD-10-CM

## 2017-06-12 DIAGNOSIS — I51.89 LEFT VENTRICULAR DIASTOLIC DYSFUNCTION WITH PRESERVED SYSTOLIC FUNCTION: ICD-10-CM

## 2017-06-12 PROCEDURE — 99999 PR PBB SHADOW E&M-EST. PATIENT-LVL III: CPT | Mod: PBBFAC,,, | Performed by: NUCLEAR MEDICINE

## 2017-06-12 PROCEDURE — 1159F MED LIST DOCD IN RCRD: CPT | Mod: S$GLB,,, | Performed by: NUCLEAR MEDICINE

## 2017-06-12 PROCEDURE — 99214 OFFICE O/P EST MOD 30 MIN: CPT | Mod: S$GLB,,, | Performed by: NUCLEAR MEDICINE

## 2017-06-12 PROCEDURE — 1126F AMNT PAIN NOTED NONE PRSNT: CPT | Mod: S$GLB,,, | Performed by: NUCLEAR MEDICINE

## 2017-06-12 RX ORDER — METOPROLOL TARTRATE 50 MG/1
50 TABLET ORAL 3 TIMES DAILY
Qty: 90 TABLET | Refills: 6 | Status: SHIPPED | OUTPATIENT
Start: 2017-06-12 | End: 2017-10-23 | Stop reason: SDUPTHER

## 2017-06-12 NOTE — PROGRESS NOTES
Subjective:   Patient ID:  Loida Holden is a 81 y.o. female who presents for follow-up of Coronary Artery Disease and Hypertension      HPI CHRONIC CAD- ANGINA  FC 2- 2- ESSENTIAL HTN  FEELING BETTER. NO RECURRENT ANGINA OR EQUIVALENT . NO RECENT HOSPITALIZATIONS OR ED VISITS FOR ACS OR ADHF  NO UNUSUAL HOOKER, WITH ORDINARY DAILY ACTIVITIES. NO ORTHOPNEA OR PND  NO PALPITATIONS. NO NEAR SYNCOPE OR SYNCOPE  NO EDEMA. NO INTERMITTENT CLAUDICATION  NO FOCAL CNS SYMPTOMS OR SIGNS TO SUGGEST TIA OR STROKE  CARD MED- GOOD COMPLANCE    Review of Systems   Constitution: Negative for chills, fever, weakness, night sweats, weight gain and weight loss.   HENT: Negative for headaches and nosebleeds.    Eyes: Negative for blurred vision, double vision and visual disturbance.   Cardiovascular: Negative for chest pain, dyspnea on exertion, irregular heartbeat, leg swelling, orthopnea, palpitations, paroxysmal nocturnal dyspnea and syncope.   Respiratory: Negative for cough, hemoptysis and wheezing.    Endocrine: Negative for polydipsia and polyuria.   Hematologic/Lymphatic: Does not bruise/bleed easily.   Skin: Negative for rash.   Musculoskeletal: Negative for joint pain, joint swelling, muscle weakness and myalgias.   Gastrointestinal: Negative for abdominal pain, hematemesis, jaundice and melena.   Genitourinary: Negative for dysuria, hematuria and nocturia.   Neurological: Negative for dizziness, focal weakness and sensory change.   Psychiatric/Behavioral: Negative for depression. The patient does not have insomnia and is not nervous/anxious.      Family History   Problem Relation Age of Onset    Heart disease Mother     Cancer Mother      breast    Liver disease Mother     Diabetes Maternal Grandfather     Psoriasis Maternal Grandmother     Heart disease Son     Colon cancer Neg Hx     Melanoma Neg Hx     Lupus Neg Hx     Eczema Neg Hx      Past Medical History:   Diagnosis Date    Angina pectoris      Anxiety     Arthritis     neck, back,     Brain cyst     x2    Colon polyp     Coronary artery disease     s/p PTCA    Diabetes mellitus type 2 without retinopathy     Diabetes type 2, controlled 2/17/2016    Diverticulitis of intestine without hemorrhage 11/10/2015    Dry senile macular degeneration     Ex-smoker 11/10/2015    Fatty liver     Gastritis     Gout     Herpes genitalia     Hiatal hernia     History of shingles     Hx of colonic polyps     8/21/09    Hyperlipidemia     Hypertension     Hypothyroid     Meningioma     6/14 mri dr cuevas    Obesity (BMI 30.0-34.9) 12/2/2015    Osteopenia 4/15 klever 4/17    Pneumonia     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 hx of seizures    Stroke     Tobacco dependence     Type 2 diabetes mellitus with stage 3 chronic kidney disease, without long-term current use of insulin     Type 2 diabetes mellitus without complication      Current Outpatient Prescriptions on File Prior to Visit   Medication Sig Dispense Refill    ACCU-CHEK NANNETTE PLUS METER [facility]       ACCU-CHEK SOFTCLIX LANCETS [facility]       allopurinol (ZYLOPRIM) 100 MG tablet TAKE ONE TABLET BY MOUTH ONCE DAILY 90 tablet 0    amlodipine (NORVASC) 5 MG tablet Take 1 tablet (5 mg total) by mouth once daily. 30 tablet 3    betamethasone valerate 0.1% (VALISONE) 0.1 % Crea APPLY TOPICALLY TWO TIMES DAILY 45 g 0    BIOTIN ORAL Take 5,000 mcg by mouth once daily.      blood sugar diagnostic (TRUETEST TEST STRIPS) Strp 1 strip by Misc.(Non-Drug; Combo Route) route 3 (three) times daily. True test strips 100 strip 11    furosemide (LASIX) 40 MG tablet Take 1 tablet (40 mg total) by mouth 2 (two) times daily. 90 tablet 1    lancets 33 gauge Misc 1 lancet by Misc.(Non-Drug; Combo Route) route 3 (three) times daily.  100 each 11    levothyroxine (SYNTHROID) 50 MCG tablet TAKE ONE TABLET BY MOUTH BEFORE BREAKFAST 30 tablet 0    pantoprazole (PROTONIX) 40 MG tablet       potassium chloride SA (K-DUR,KLOR-CON) 20 MEQ tablet Take 1 tablet (20 mEq total) by mouth 2 (two) times daily. 60 tablet 1    walker Misc Use rollator walker to reduce stress on knee 1 each 0    [DISCONTINUED] metoprolol tartrate (LOPRESSOR) 50 MG tablet Take 1 tablet (50 mg total) by mouth 3 (three) times daily. 90 tablet 6    [DISCONTINUED] hydrochlorothiazide (HYDRODIURIL) 25 MG tablet TAKE ONE TABLET BY MOUTH ONCE DAILY. 90 tablet 3     No current facility-administered medications on file prior to visit.      Review of patient's allergies indicates:   Allergen Reactions    Codeine      Other reaction(s): Unknown    Diprivan [propofol]     Doxycycline      Other reaction(s): Unknown    Iodine and iodide containing products      Other reaction(s): Unknown    Ivp dye  [iodinated contrast media - oral and iv dye]      Other reaction(s): Unknown    Latex, natural rubber Hives    Metronidazole hcl      Other reaction(s): Unknown  Other reaction(s): Unknown    Pantoprazole      Difficulty urinating      Procaine      Other reaction(s): Unknown  Other reaction(s): Unknown    Propofol analogues Other (See Comments)     Passes out     Rosuvastatin      Other reaction(s): liver enlargement  Other reaction(s): liver enlargement    Simvastatin      Other reaction(s): Unknown  Liver problems      Sulfasalazine Other (See Comments)       Objective:     Physical Exam   Constitutional: She is oriented to person, place, and time. She appears well-developed. No distress.   HENT:   Head: Normocephalic.   Eyes: Conjunctivae are normal. Pupils are equal, round, and reactive to light. No scleral icterus.   Neck: Normal range of motion. Neck supple. Normal carotid pulses, no hepatojugular reflux and no JVD present. Carotid bruit is not present. No edema present.  No thyroid mass and no thyromegaly present.   Cardiovascular: Normal rate, regular rhythm, S1 normal, S2 normal, normal heart sounds and intact distal pulses.  PMI is not displaced.  Exam reveals no gallop and no friction rub.    No murmur heard.  Pulses:       Carotid pulses are 2+ on the right side, and 2+ on the left side.       Radial pulses are 2+ on the right side, and 2+ on the left side.        Femoral pulses are 2+ on the right side, and 2+ on the left side.       Popliteal pulses are 2+ on the right side, and 2+ on the left side.        Dorsalis pedis pulses are 2+ on the right side, and 2+ on the left side.        Posterior tibial pulses are 2+ on the right side, and 2+ on the left side.   Pulmonary/Chest: Effort normal and breath sounds normal. She has no wheezes. She has no rales. She exhibits no tenderness.   Abdominal: Soft. Bowel sounds are normal. She exhibits no pulsatile midline mass and no mass. There is no hepatosplenomegaly. There is no tenderness.   Musculoskeletal: Normal range of motion. She exhibits no edema or tenderness.        Cervical back: Normal.        Thoracic back: Normal.        Lumbar back: Normal.   Lymphadenopathy:     She has no cervical adenopathy.     She has no axillary adenopathy.        Right: No supraclavicular adenopathy present.        Left: No supraclavicular adenopathy present.   Neurological: She is alert and oriented to person, place, and time. She has normal strength and normal reflexes. No sensory deficit. Gait normal.   Skin: Skin is warm. No rash noted. No cyanosis. No pallor. Nails show no clubbing.   Psychiatric: She has a normal mood and affect. Her speech is normal and behavior is normal. Cognition and memory are normal.       Assessment:     1. Coronary artery disease, occlusive    2. Essential hypertension    3. Left ventricular diastolic dysfunction with preserved systolic function      CV STATUS STABLE. NO EVIDENCE OF ACTIVE MYOCARDIAL ISCHEMIA. NO  ARRHYTHMIAS. NO ADHF  BP WELL CONTROLLED  CNS STATUS STABLE  CARD MED WELL TOLERATED  Plan:     Coronary artery disease, occlusive    Essential hypertension    Left ventricular diastolic dysfunction with preserved systolic function      1- CONTINUE PRESENT CARD MANAGEMENET    2- RETURN IN 6 MONTHS,

## 2017-06-20 RX ORDER — ALLOPURINOL 100 MG/1
TABLET ORAL
Qty: 90 TABLET | Refills: 0 | Status: SHIPPED | OUTPATIENT
Start: 2017-06-20 | End: 2017-09-25 | Stop reason: SDUPTHER

## 2017-06-28 RX ORDER — FUROSEMIDE 40 MG/1
TABLET ORAL
Qty: 90 TABLET | Refills: 1 | Status: SHIPPED | OUTPATIENT
Start: 2017-06-28 | End: 2017-10-23 | Stop reason: SDUPTHER

## 2017-07-04 DIAGNOSIS — I10 ESSENTIAL HYPERTENSION: Chronic | ICD-10-CM

## 2017-07-05 RX ORDER — AMLODIPINE BESYLATE 5 MG/1
TABLET ORAL
Qty: 30 TABLET | Refills: 0 | Status: SHIPPED | OUTPATIENT
Start: 2017-07-05 | End: 2017-08-03 | Stop reason: SDUPTHER

## 2017-07-12 ENCOUNTER — OFFICE VISIT (OUTPATIENT)
Dept: OPHTHALMOLOGY | Facility: CLINIC | Age: 82
End: 2017-07-12
Payer: MEDICARE

## 2017-07-12 DIAGNOSIS — D32.9 MENINGIOMA: Primary | ICD-10-CM

## 2017-07-12 DIAGNOSIS — G93.0 ARACHNOID CYST: ICD-10-CM

## 2017-07-12 PROCEDURE — 92012 INTRM OPH EXAM EST PATIENT: CPT | Mod: S$GLB,,, | Performed by: OPHTHALMOLOGY

## 2017-07-12 PROCEDURE — 92083 EXTENDED VISUAL FIELD XM: CPT | Mod: S$GLB,,, | Performed by: OPHTHALMOLOGY

## 2017-07-12 PROCEDURE — 99999 PR PBB SHADOW E&M-EST. PATIENT-LVL II: CPT | Mod: PBBFAC,,, | Performed by: OPHTHALMOLOGY

## 2017-07-12 NOTE — PROGRESS NOTES
SUBJECTIVE:   Loida Holden is a 81 y.o. female   Uncorrected distance visual acuity was J1 in the right eye and 20/20 -2 in the left eye.   No chief complaint on file.       HPI:      Assessment /Plan :  1. Meningioma    I referred pt to Dr Dhillon for worsening of VF last visit- no interval change in MRI. Dr Dhillon suggested neurosurgical consult based on VF changes. Pt returns today feeling that double vision and VF are improve and repeat NVF today much better. So at this point unless Dr Dhillon has any other concerns I think further observation is appropriate and Neurosx consult can wait unless new changes. Pt agrees and I will notify Dr Dhillon.   2. Arachnoid cyst as above     RTC prn routine visit or changes

## 2017-07-15 RX ORDER — LEVOTHYROXINE SODIUM 50 UG/1
TABLET ORAL
Qty: 30 TABLET | Refills: 0 | OUTPATIENT
Start: 2017-07-15

## 2017-07-17 RX ORDER — LEVOTHYROXINE SODIUM 50 UG/1
TABLET ORAL
Qty: 90 TABLET | Refills: 2 | Status: SHIPPED | OUTPATIENT
Start: 2017-07-17

## 2017-08-03 DIAGNOSIS — I10 ESSENTIAL HYPERTENSION: Chronic | ICD-10-CM

## 2017-08-03 RX ORDER — AMLODIPINE BESYLATE 5 MG/1
TABLET ORAL
Qty: 30 TABLET | Refills: 0 | Status: SHIPPED | OUTPATIENT
Start: 2017-08-03 | End: 2017-09-11 | Stop reason: SDUPTHER

## 2017-08-25 DIAGNOSIS — E11.9 TYPE 2 DIABETES MELLITUS WITHOUT COMPLICATION: ICD-10-CM

## 2017-09-11 DIAGNOSIS — I10 ESSENTIAL HYPERTENSION: Chronic | ICD-10-CM

## 2017-09-11 RX ORDER — AMLODIPINE BESYLATE 5 MG/1
5 TABLET ORAL DAILY
Qty: 90 TABLET | Refills: 3 | Status: SHIPPED | OUTPATIENT
Start: 2017-09-11 | End: 2018-04-19

## 2017-09-11 NOTE — TELEPHONE ENCOUNTER
Approved Medications   amlodipine (NORVASC) 5 MG tablet  Take 1 tablet (5 mg total) by mouth once daily.       Disp: 90 tablet Refills: 3    Class: Normal Start: 9/11/2017   For: Essential hypertension  Approved by: Conrad Hough MD  [address] [phone]

## 2017-09-11 NOTE — TELEPHONE ENCOUNTER
----- Message from Claribel Mccabe sent at 9/11/2017 10:11 AM CDT -----  Contact: Pt   Pt called and stated she needed to speak to the nurse. She stated that she is calling to check the status a refill request for Amlodipine 5 mg. She stated she has requested the refill over a week ago and this is the third request.    [facility] [address] [place] [id number] [facility]  [address] [facility] [address]  Phone: [phone] Fax: [phone]    She can be reached at [phone].  Thanks,  TF

## 2017-09-25 RX ORDER — ALLOPURINOL 100 MG/1
TABLET ORAL
Qty: 90 TABLET | Refills: 0 | Status: SHIPPED | OUTPATIENT
Start: 2017-09-25 | End: 2017-12-13 | Stop reason: SDUPTHER

## 2017-10-23 ENCOUNTER — OFFICE VISIT (OUTPATIENT)
Dept: CARDIOLOGY | Facility: CLINIC | Age: 82
End: 2017-10-23
Payer: MEDICARE

## 2017-10-23 VITALS
HEIGHT: 64 IN | DIASTOLIC BLOOD PRESSURE: 80 MMHG | BODY MASS INDEX: 29.37 KG/M2 | HEART RATE: 60 BPM | SYSTOLIC BLOOD PRESSURE: 140 MMHG | WEIGHT: 172 LBS

## 2017-10-23 DIAGNOSIS — I25.10 CORONARY ARTERY DISEASE, OCCLUSIVE: Primary | Chronic | ICD-10-CM

## 2017-10-23 DIAGNOSIS — I10 ESSENTIAL HYPERTENSION: Chronic | ICD-10-CM

## 2017-10-23 DIAGNOSIS — I51.89 LEFT VENTRICULAR DIASTOLIC DYSFUNCTION WITH PRESERVED SYSTOLIC FUNCTION: ICD-10-CM

## 2017-10-23 PROCEDURE — 99999 PR PBB SHADOW E&M-EST. PATIENT-LVL III: CPT | Mod: PBBFAC,,, | Performed by: NUCLEAR MEDICINE

## 2017-10-23 PROCEDURE — 99215 OFFICE O/P EST HI 40 MIN: CPT | Mod: S$GLB,,, | Performed by: NUCLEAR MEDICINE

## 2017-10-23 RX ORDER — METOPROLOL TARTRATE 50 MG/1
100 TABLET ORAL 2 TIMES DAILY
Qty: 120 TABLET | Refills: 6 | Status: SHIPPED | OUTPATIENT
Start: 2017-10-23 | End: 2018-05-15 | Stop reason: SDUPTHER

## 2017-10-23 RX ORDER — CALCIUM CARBONATE 200(500)MG
1 TABLET,CHEWABLE ORAL DAILY
COMMUNITY

## 2017-10-23 RX ORDER — FUROSEMIDE 40 MG/1
40 TABLET ORAL 2 TIMES DAILY
Qty: 180 TABLET | Refills: 3 | Status: SHIPPED | OUTPATIENT
Start: 2017-10-23 | End: 2018-05-15 | Stop reason: SDUPTHER

## 2017-10-23 NOTE — PROGRESS NOTES
Subjective:   Patient ID:  Loida Holden is a 81 y.o. female who presents for follow-up of Coronary Artery Disease (6 month followup) and Hypertension      HPI CHRONIC CAD- ANGINA FC 2-   2- ESSENTIAL HTN  HAVING PROBLEMS WITH CONTROL OF BP. INCREASE EMOTIONAL STRESS.  DUE TO RECENT DX OF GRIMES'S ESOPHAGUS.  PATTERN OF ANGINA STABLE  NO UNUSUAL HOOKER. NO ORTHOPNEA OR PND  NO EDEMA. NO CALVE TENDERNESS  NO FOCAL CNS SYMPTOMS OR SIGNS TO SUGGEST TIA OR STROKE    Review of Systems   Constitution: Negative for chills, fever, weakness, night sweats, weight gain and weight loss.   HENT: Negative for nosebleeds.    Eyes: Negative for blurred vision, double vision and visual disturbance.   Cardiovascular: Negative for chest pain, dyspnea on exertion, irregular heartbeat, leg swelling, orthopnea, palpitations, paroxysmal nocturnal dyspnea and syncope.   Respiratory: Negative for cough, hemoptysis and wheezing.    Endocrine: Negative for polydipsia and polyuria.   Hematologic/Lymphatic: Does not bruise/bleed easily.   Skin: Negative for rash.   Musculoskeletal: Negative for joint pain, joint swelling, muscle weakness and myalgias.   Gastrointestinal: Negative for abdominal pain, hematemesis, jaundice and melena.   Genitourinary: Negative for dysuria, hematuria and nocturia.   Neurological: Negative for dizziness, focal weakness, headaches and sensory change.   Psychiatric/Behavioral: Negative for depression. The patient does not have insomnia and is not nervous/anxious.      Family History   Problem Relation Age of Onset    Heart disease Mother     Cancer Mother      breast    Liver disease Mother     Diabetes Maternal Grandfather     Psoriasis Maternal Grandmother     Heart disease Son     Colon cancer Neg Hx     Melanoma Neg Hx     Lupus Neg Hx     Eczema Neg Hx      Past Medical History:   Diagnosis Date    Angina pectoris     Anxiety     Arthritis     neck, back,     Brain cyst     x2    Colon  polyp     Coronary artery disease     s/p PTCA    Diabetes mellitus type 2 without retinopathy     Diabetes type 2, controlled 2/17/2016    Diverticulitis of intestine without hemorrhage 11/10/2015    Dry senile macular degeneration     Ex-smoker 11/10/2015    Fatty liver     Gastritis     Gout     Herpes genitalia     Hiatal hernia     History of shingles     Hx of colonic polyps     8/21/09    Hyperlipidemia     Hypertension     Hypothyroid     Meningioma     6/14 mri dr cuevas    Obesity (BMI 30.0-34.9) 12/2/2015    Osteopenia 4/15 klever 4/17    Pneumonia     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 hx of seizures    Stroke     Tobacco dependence     Type 2 diabetes mellitus with stage 3 chronic kidney disease, without long-term current use of insulin     Type 2 diabetes mellitus without complication      Current Outpatient Prescriptions on File Prior to Visit   Medication Sig Dispense Refill    ACCU-CHEK NANNETTE PLUS METER [facility]       ACCU-CHEK SOFTCLIX LANCETS [facility]       allopurinol (ZYLOPRIM) 100 MG tablet TAKE ONE TABLET BY MOUTH ONCE DAILY 90 tablet 0    amlodipine (NORVASC) 5 MG tablet Take 1 tablet (5 mg total) by mouth once daily. 90 tablet 3    betamethasone valerate 0.1% (VALISONE) 0.1 % Crea APPLY TOPICALLY TWO TIMES DAILY 45 g 0    BIOTIN ORAL Take 5,000 mcg by mouth once daily.      blood sugar diagnostic (TRUETEST TEST STRIPS) Strp 1 strip by Misc.(Non-Drug; Combo Route) route 3 (three) times daily. True test strips 100 strip 11    furosemide (LASIX) 40 MG tablet TAKE ONE TABLET BY MOUTH ONCE DAILY 90 tablet 1    lancets 33 gauge Misc 1 lancet by Misc.(Non-Drug; Combo Route) route 3 (three) times daily. 100 each 11    levothyroxine (SYNTHROID) 50 MCG tablet TAKE ONE TABLET BY MOUTH ONCE DAILY  BEFORE BREAKFAST 90 tablet 2    metoprolol tartrate (LOPRESSOR) 50 MG tablet Take 1 tablet (50 mg total) by mouth 3 (three) times daily. 90 tablet 6    pantoprazole (PROTONIX) 40 MG tablet       walker Misc Use rollator walker to reduce stress on knee 1 each 0    potassium chloride SA (K-DUR,KLOR-CON) 20 MEQ tablet Take 1 tablet (20 mEq total) by mouth 2 (two) times daily. 60 tablet 1    [DISCONTINUED] furosemide (LASIX) 40 MG tablet Take 1 tablet (40 mg total) by mouth 2 (two) times daily. 90 tablet 1     No current facility-administered medications on file prior to visit.      Review of patient's allergies indicates:   Allergen Reactions    Codeine      Other reaction(s): Unknown    Diprivan [propofol]     Doxycycline      Other reaction(s): Unknown    Iodine and iodide containing products      Other reaction(s): Unknown    Ivp dye  [iodinated contrast- oral and iv dye]      Other reaction(s): Unknown    Latex, natural rubber Hives    Metronidazole hcl      Other reaction(s): Unknown  Other reaction(s): Unknown    Pantoprazole      Difficulty urinating      Procaine      Other reaction(s): Unknown  Other reaction(s): Unknown    Propofol analogues Other (See Comments)     Passes out     Rosuvastatin      Other reaction(s): liver enlargement  Other reaction(s): liver enlargement    Simvastatin      Other reaction(s): Unknown  Liver problems      Sulfasalazine Other (See Comments)       Objective:     Physical Exam   Constitutional: She is oriented to person, place, and time. She appears well-developed. No distress.   HENT:   Head: Normocephalic.   Eyes: Conjunctivae are normal. Pupils are equal, round, and reactive to light. No scleral icterus.   Neck: Normal range of motion. Neck supple. Normal carotid pulses, no hepatojugular reflux and no JVD present. Carotid bruit is not present. No edema present. No thyroid mass and no thyromegaly present.   Cardiovascular: Normal rate, regular rhythm, S1  normal, S2 normal, normal heart sounds and intact distal pulses.  PMI is not displaced.  Exam reveals no gallop and no friction rub.    No murmur heard.  Pulses:       Carotid pulses are 2+ on the right side, and 2+ on the left side.       Radial pulses are 2+ on the right side, and 2+ on the left side.        Femoral pulses are 2+ on the right side, and 2+ on the left side.       Popliteal pulses are 2+ on the right side, and 2+ on the left side.        Dorsalis pedis pulses are 2+ on the right side, and 2+ on the left side.        Posterior tibial pulses are 2+ on the right side, and 2+ on the left side.   Pulmonary/Chest: Effort normal and breath sounds normal. She has no wheezes. She has no rales. She exhibits no tenderness.   Abdominal: Soft. Bowel sounds are normal. She exhibits no pulsatile midline mass and no mass. There is no hepatosplenomegaly. There is no tenderness.   Musculoskeletal: Normal range of motion. She exhibits no edema or tenderness.        Cervical back: Normal.        Thoracic back: Normal.        Lumbar back: Normal.   Lymphadenopathy:     She has no cervical adenopathy.     She has no axillary adenopathy.        Right: No supraclavicular adenopathy present.        Left: No supraclavicular adenopathy present.   Neurological: She is alert and oriented to person, place, and time. She has normal strength and normal reflexes. No sensory deficit. Gait normal.   Skin: Skin is warm. No rash noted. No cyanosis. No pallor. Nails show no clubbing.   Psychiatric: She has a normal mood and affect. Her speech is normal and behavior is normal. Cognition and memory are normal.       Assessment:     1. Coronary artery disease, occlusive    2. Essential hypertension    3. Left ventricular diastolic dysfunction with preserved systolic function      UNCONTROLLED HTN  STABLE CAD-   STABLE CNS STATUS   NO CLINICAL EVIDENCE OF CARD ARRHYTHMIAS. NO ADHF.  Plan:     Coronary artery disease, occlusive    Essential  hypertension    Left ventricular diastolic dysfunction with preserved systolic function    1- OMT BBS--  INCREASE METOPROLOL TARTRATE 100 MG  BID    2- RETURN IN 3 MONTHS

## 2017-10-26 ENCOUNTER — LAB VISIT (OUTPATIENT)
Dept: LAB | Facility: HOSPITAL | Age: 82
End: 2017-10-26
Attending: FAMILY MEDICINE
Payer: MEDICARE

## 2017-10-26 ENCOUNTER — OFFICE VISIT (OUTPATIENT)
Dept: INTERNAL MEDICINE | Facility: CLINIC | Age: 82
End: 2017-10-26
Payer: MEDICARE

## 2017-10-26 VITALS
HEIGHT: 64 IN | HEART RATE: 57 BPM | DIASTOLIC BLOOD PRESSURE: 60 MMHG | BODY MASS INDEX: 29.59 KG/M2 | SYSTOLIC BLOOD PRESSURE: 118 MMHG | WEIGHT: 173.31 LBS | TEMPERATURE: 94 F

## 2017-10-26 DIAGNOSIS — E03.1 CONGENITAL HYPOTHYROIDISM WITHOUT GOITER: Chronic | ICD-10-CM

## 2017-10-26 DIAGNOSIS — E11.22 TYPE 2 DIABETES MELLITUS WITH STAGE 3 CHRONIC KIDNEY DISEASE, WITHOUT LONG-TERM CURRENT USE OF INSULIN: Primary | ICD-10-CM

## 2017-10-26 DIAGNOSIS — I10 ESSENTIAL HYPERTENSION: Chronic | ICD-10-CM

## 2017-10-26 DIAGNOSIS — K21.9 GASTROESOPHAGEAL REFLUX DISEASE, ESOPHAGITIS PRESENCE NOT SPECIFIED: ICD-10-CM

## 2017-10-26 DIAGNOSIS — E11.22 TYPE 2 DIABETES MELLITUS WITH STAGE 3 CHRONIC KIDNEY DISEASE, WITHOUT LONG-TERM CURRENT USE OF INSULIN: ICD-10-CM

## 2017-10-26 DIAGNOSIS — N18.30 TYPE 2 DIABETES MELLITUS WITH STAGE 3 CHRONIC KIDNEY DISEASE, WITHOUT LONG-TERM CURRENT USE OF INSULIN: Primary | ICD-10-CM

## 2017-10-26 DIAGNOSIS — N18.30 TYPE 2 DIABETES MELLITUS WITH STAGE 3 CHRONIC KIDNEY DISEASE, WITHOUT LONG-TERM CURRENT USE OF INSULIN: ICD-10-CM

## 2017-10-26 LAB
ALBUMIN SERPL BCP-MCNC: 3.9 G/DL
ALP SERPL-CCNC: 106 U/L
ALT SERPL W/O P-5'-P-CCNC: 32 U/L
ANION GAP SERPL CALC-SCNC: 9 MMOL/L
AST SERPL-CCNC: 33 U/L
BILIRUB SERPL-MCNC: 0.7 MG/DL
BUN SERPL-MCNC: 15 MG/DL
CALCIUM SERPL-MCNC: 9.5 MG/DL
CHLORIDE SERPL-SCNC: 102 MMOL/L
CO2 SERPL-SCNC: 32 MMOL/L
CREAT SERPL-MCNC: 1.1 MG/DL
EST. GFR  (AFRICAN AMERICAN): 54.4 ML/MIN/1.73 M^2
EST. GFR  (NON AFRICAN AMERICAN): 47.2 ML/MIN/1.73 M^2
ESTIMATED AVG GLUCOSE: 146 MG/DL
GLUCOSE SERPL-MCNC: 116 MG/DL
HBA1C MFR BLD HPLC: 6.7 %
POTASSIUM SERPL-SCNC: 3.5 MMOL/L
PROT SERPL-MCNC: 7.8 G/DL
SODIUM SERPL-SCNC: 143 MMOL/L
T4 FREE SERPL-MCNC: 0.98 NG/DL
TSH SERPL DL<=0.005 MIU/L-ACNC: 4.16 UIU/ML

## 2017-10-26 PROCEDURE — 36415 COLL VENOUS BLD VENIPUNCTURE: CPT | Mod: PO

## 2017-10-26 PROCEDURE — 99214 OFFICE O/P EST MOD 30 MIN: CPT | Mod: S$GLB,,, | Performed by: FAMILY MEDICINE

## 2017-10-26 PROCEDURE — 83036 HEMOGLOBIN GLYCOSYLATED A1C: CPT

## 2017-10-26 PROCEDURE — 84439 ASSAY OF FREE THYROXINE: CPT

## 2017-10-26 PROCEDURE — 99499 UNLISTED E&M SERVICE: CPT | Mod: S$GLB,,, | Performed by: FAMILY MEDICINE

## 2017-10-26 PROCEDURE — 80053 COMPREHEN METABOLIC PANEL: CPT

## 2017-10-26 PROCEDURE — 84443 ASSAY THYROID STIM HORMONE: CPT

## 2017-10-26 PROCEDURE — 99999 PR PBB SHADOW E&M-EST. PATIENT-LVL III: CPT | Mod: PBBFAC,,, | Performed by: FAMILY MEDICINE

## 2017-10-26 NOTE — PROGRESS NOTES
Subjective:       Patient ID: Loida Holden is a 81 y.o. female.    Chief Complaint: Follow-up    F/U:       Pt is an 81 year with HTN and DM that has been under control. Pt has GERD and has Coello's esophagus. Pt has had some dysplasia.       Review of Systems   Constitutional: Negative.    HENT: Negative.    Respiratory: Negative.    Cardiovascular: Negative.    Gastrointestinal: Negative.    Genitourinary: Negative.    Musculoskeletal: Negative.    Neurological: Negative.    Hematological: Negative.    Psychiatric/Behavioral: Negative.        Objective:      Physical Exam   Constitutional: She is oriented to person, place, and time. She appears well-developed and well-nourished.   Cardiovascular: Normal rate and regular rhythm.    Pulmonary/Chest: Effort normal and breath sounds normal.   Abdominal: Soft. Bowel sounds are normal. There is no tenderness. There is no guarding.   Neurological: She is alert and oriented to person, place, and time.   Skin: Skin is warm and dry.   Psychiatric: She has a normal mood and affect. Her behavior is normal.       Assessment:       1. Type 2 diabetes mellitus with stage 3 chronic kidney disease, without long-term current use of insulin    2. Essential hypertension    3. Congenital hypothyroidism without goiter    4. Gastroesophageal reflux disease, esophagitis presence not specified        Plan:       Type 2 diabetes mellitus with stage 3 chronic kidney disease, without long-term current use of insulin  Comments:  Will get her HgA1C and microalubmin  Orders:  -     Hemoglobin A1c; Future; Expected date: 10/26/2017  -     Microalbumin/creatinine urine ratio; Future; Expected date: 10/26/2017    Essential hypertension  Comments:  BP is well controlled  Orders:  -     Comprehensive metabolic panel; Future; Expected date: 10/26/2017    Congenital hypothyroidism without goiter  Comments:  Will check TSH  Orders:  -     T4, free; Future; Expected date: 10/26/2017  -      TSH; Future; Expected date: 10/26/2017    Gastroesophageal reflux disease, esophagitis presence not specified  Comments:  Pt needs to follow-up on GI.

## 2017-11-08 ENCOUNTER — TELEPHONE (OUTPATIENT)
Dept: INTERNAL MEDICINE | Facility: CLINIC | Age: 82
End: 2017-11-08

## 2017-11-08 NOTE — TELEPHONE ENCOUNTER
----- Message from Juana Aguilar sent at 11/8/2017 11:34 AM CST -----  Contact: pt  Please call pt @ [phone], pt would like to get a copy of lab done 10/26, pt will .

## 2017-12-14 RX ORDER — ALLOPURINOL 100 MG/1
TABLET ORAL
Qty: 90 TABLET | Refills: 3 | Status: SHIPPED | OUTPATIENT
Start: 2017-12-14

## 2018-01-31 DIAGNOSIS — I10 BENIGN ESSENTIAL HTN: Primary | ICD-10-CM

## 2018-02-01 ENCOUNTER — CLINICAL SUPPORT (OUTPATIENT)
Dept: CARDIOLOGY | Facility: CLINIC | Age: 83
End: 2018-02-01
Payer: MEDICARE

## 2018-02-01 ENCOUNTER — OFFICE VISIT (OUTPATIENT)
Dept: CARDIOLOGY | Facility: CLINIC | Age: 83
End: 2018-02-01
Payer: MEDICARE

## 2018-02-01 VITALS
HEIGHT: 64 IN | SYSTOLIC BLOOD PRESSURE: 140 MMHG | WEIGHT: 176 LBS | DIASTOLIC BLOOD PRESSURE: 70 MMHG | BODY MASS INDEX: 30.05 KG/M2 | HEART RATE: 58 BPM

## 2018-02-01 DIAGNOSIS — I10 BENIGN ESSENTIAL HTN: ICD-10-CM

## 2018-02-01 DIAGNOSIS — I70.0 ATHEROSCLEROSIS OF AORTA: ICD-10-CM

## 2018-02-01 DIAGNOSIS — I10 ESSENTIAL HYPERTENSION: Chronic | ICD-10-CM

## 2018-02-01 DIAGNOSIS — I25.10 CORONARY ARTERY DISEASE, OCCLUSIVE: Primary | Chronic | ICD-10-CM

## 2018-02-01 DIAGNOSIS — I51.89 LEFT VENTRICULAR DIASTOLIC DYSFUNCTION WITH PRESERVED SYSTOLIC FUNCTION: ICD-10-CM

## 2018-02-01 DIAGNOSIS — E11.69 HYPERLIPIDEMIA ASSOCIATED WITH TYPE 2 DIABETES MELLITUS: Chronic | ICD-10-CM

## 2018-02-01 DIAGNOSIS — E78.5 HYPERLIPIDEMIA ASSOCIATED WITH TYPE 2 DIABETES MELLITUS: Chronic | ICD-10-CM

## 2018-02-01 PROCEDURE — 99999 PR PBB SHADOW E&M-EST. PATIENT-LVL III: CPT | Mod: PBBFAC,,, | Performed by: NUCLEAR MEDICINE

## 2018-02-01 PROCEDURE — 93000 ELECTROCARDIOGRAM COMPLETE: CPT | Mod: S$GLB,,, | Performed by: INTERNAL MEDICINE

## 2018-02-01 PROCEDURE — 3008F BODY MASS INDEX DOCD: CPT | Mod: S$GLB,,, | Performed by: NUCLEAR MEDICINE

## 2018-02-01 PROCEDURE — 1159F MED LIST DOCD IN RCRD: CPT | Mod: S$GLB,,, | Performed by: NUCLEAR MEDICINE

## 2018-02-01 PROCEDURE — 1126F AMNT PAIN NOTED NONE PRSNT: CPT | Mod: S$GLB,,, | Performed by: NUCLEAR MEDICINE

## 2018-02-01 PROCEDURE — 99214 OFFICE O/P EST MOD 30 MIN: CPT | Mod: S$GLB,,, | Performed by: NUCLEAR MEDICINE

## 2018-02-01 RX ORDER — AMOXICILLIN 875 MG/1
875 TABLET, FILM COATED ORAL 2 TIMES DAILY
Status: ON HOLD | COMMUNITY
End: 2018-05-02 | Stop reason: HOSPADM

## 2018-02-01 NOTE — PROGRESS NOTES
Subjective:   Patient ID:  Loida Holden is a 82 y.o. female who presents for follow-up of Coronary Artery Disease; Hypertension; and Hyperlipidemia      HPI 1- CHRONIC CAD- ANGINA FC 2   ATHEROSCLEROSIS OF AORTA   2- ESSENTIAL HTN WITH DD,   AND DYSLIPIDEMIA  DOING WELL. NO RECURRENT ANGINA. NO NEED OF SL NTG.  NO RECENT HOSPITALIZATIONS OR ED VISITS FOR ACS  NO UNUSUAL HOOKER WITH ORDINARY DAILY ACTIVITIES. NO ORTHOPNEA OR PND  NO PALPITATIONS. NO NEAR SYNCOPE OR SYNCOPE  NO ABDOMINAL DISCOMFORT  NO EDEMA. NO CALVE TENDERNESS.   NO INTERMITTENT CLAUDICATION  NO FOCAL CNS SYMPTOMS OR SIGNS TO  SUGGEST TIA OR STROKE  ECG - MILD SB 58, FAVB , NO ACUTE ST T CHANGES  CARD MED GOOD COMPLIANCE- SIDE EFFECTS    Review of Systems   Constitution: Negative for chills, fever, weakness, night sweats, weight gain and weight loss.   HENT: Negative for nosebleeds.    Eyes: Negative for blurred vision, double vision and visual disturbance.   Cardiovascular: Negative for chest pain, dyspnea on exertion, irregular heartbeat, leg swelling, orthopnea, palpitations, paroxysmal nocturnal dyspnea and syncope.   Respiratory: Negative for cough, hemoptysis and wheezing.    Endocrine: Negative for polydipsia and polyuria.   Hematologic/Lymphatic: Does not bruise/bleed easily.   Skin: Negative for rash.   Musculoskeletal: Negative for joint pain, joint swelling, muscle weakness and myalgias.   Gastrointestinal: Negative for abdominal pain, hematemesis, jaundice and melena.   Genitourinary: Negative for dysuria, hematuria and nocturia.   Neurological: Negative for dizziness, focal weakness, headaches and sensory change.   Psychiatric/Behavioral: Negative for depression. The patient does not have insomnia and is not nervous/anxious.      Family History   Problem Relation Age of Onset    Heart disease Mother     Cancer Mother      breast    Liver disease Mother     Diabetes Maternal Grandfather     Psoriasis Maternal Grandmother      Heart disease Son     Colon cancer Neg Hx     Melanoma Neg Hx     Lupus Neg Hx     Eczema Neg Hx      Past Medical History:   Diagnosis Date    Angina pectoris     Anxiety     Arthritis     neck, back,     Brain cyst     x2    Colon polyp     Coronary artery disease     s/p PTCA    Diabetes mellitus type 2 without retinopathy     Diabetes type 2, controlled 2/17/2016    Diverticulitis of intestine without hemorrhage 11/10/2015    Dry senile macular degeneration     Ex-smoker 11/10/2015    Fatty liver     Gastritis     Gout     Herpes genitalia     Hiatal hernia     History of shingles     Hx of colonic polyps     8/21/09    Hyperlipidemia     Hypertension     Hypothyroid     Meningioma     6/14 mri dr cuevas    Obesity (BMI 30.0-34.9) 12/2/2015    Osteopenia 4/15 klever 4/17    Pneumonia     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 hx of seizures    Stroke     Tobacco dependence     Type 2 diabetes mellitus with stage 3 chronic kidney disease, without long-term current use of insulin     Type 2 diabetes mellitus without complication      Current Outpatient Prescriptions on File Prior to Visit   Medication Sig Dispense Refill    ACCU-CHEK NANNETTE PLUS METER [facility]       ACCU-CHEK SOFTCLIX LANCETS [facility]       allopurinol (ZYLOPRIM) 100 MG tablet TAKE ONE TABLET BY MOUTH ONCE DAILY 90 tablet 3    amlodipine (NORVASC) 5 MG tablet Take 1 tablet (5 mg total) by mouth once daily. 90 tablet 3    betamethasone valerate 0.1% (VALISONE) 0.1 % Crea APPLY TOPICALLY TWO TIMES DAILY 45 g 0    BIOTIN ORAL Take 5,000 mcg by mouth once daily.      blood sugar diagnostic (TRUETEST TEST STRIPS) Strp 1 strip by Misc.(Non-Drug; Combo Route) route 3 (three) times daily. True test strips 100 strip 11    calcium carbonate  (TUMS) 200 mg calcium (500 mg) chewable tablet Take 1 tablet by mouth once daily.      furosemide (LASIX) 40 MG tablet Take 1 tablet (40 mg total) by mouth 2 (two) times daily. 180 tablet 3    lancets 33 gauge Misc 1 lancet by Misc.(Non-Drug; Combo Route) route 3 (three) times daily. 100 each 11    levothyroxine (SYNTHROID) 50 MCG tablet TAKE ONE TABLET BY MOUTH ONCE DAILY BEFORE BREAKFAST 90 tablet 2    MAG/ALUMINUM/SOD BICARB/ALGINC (GAVISCON ORAL) Take by mouth.      metoprolol tartrate (LOPRESSOR) 50 MG tablet Take 2 tablets (100 mg total) by mouth 2 (two) times daily. 120 tablet 6    pantoprazole (PROTONIX) 40 MG tablet       potassium chloride SA (K-DUR,KLOR-CON) 20 MEQ tablet Take 1 tablet (20 mEq total) by mouth 2 (two) times daily. 60 tablet 1    walker Misc Use rollator walker to reduce stress on knee 1 each 0     No current facility-administered medications on file prior to visit.      Review of patient's allergies indicates:   Allergen Reactions    Codeine      Other reaction(s): Unknown    Diprivan [propofol]     Doxycycline      Other reaction(s): Unknown    Iodine and iodide containing products      Other reaction(s): Unknown    Ivp dye  [iodinated contrast- oral and iv dye]      Other reaction(s): Unknown    Latex, natural rubber Hives    Metronidazole hcl      Other reaction(s): Unknown  Other reaction(s): Unknown    Pantoprazole      Difficulty urinating      Procaine      Other reaction(s): Unknown  Other reaction(s): Unknown    Propofol analogues Other (See Comments)     Passes out     Rosuvastatin      Other reaction(s): liver enlargement  Other reaction(s): liver enlargement    Simvastatin      Other reaction(s): Unknown  Liver problems      Sulfasalazine Other (See Comments)       Objective:     Physical Exam   Constitutional: She is oriented to person, place, and time. She appears well-developed. No distress.   HENT:   Head: Normocephalic.   Eyes: Conjunctivae are normal.  Pupils are equal, round, and reactive to light. No scleral icterus.   Neck: Normal range of motion. Neck supple. Normal carotid pulses, no hepatojugular reflux and no JVD present. Carotid bruit is not present. No edema present. No thyroid mass and no thyromegaly present.   Cardiovascular: Normal rate, regular rhythm, S1 normal, S2 normal, normal heart sounds and intact distal pulses.  PMI is not displaced.  Exam reveals no gallop and no friction rub.    No murmur heard.  Pulses:       Carotid pulses are 2+ on the right side, and 2+ on the left side.       Radial pulses are 2+ on the right side, and 2+ on the left side.        Femoral pulses are 2+ on the right side, and 2+ on the left side.       Popliteal pulses are 2+ on the right side, and 2+ on the left side.        Dorsalis pedis pulses are 2+ on the right side, and 2+ on the left side.        Posterior tibial pulses are 2+ on the right side, and 2+ on the left side.   Pulmonary/Chest: Effort normal and breath sounds normal. She has no wheezes. She has no rales. She exhibits no tenderness.   Abdominal: Soft. Bowel sounds are normal. She exhibits no pulsatile midline mass and no mass. There is no hepatosplenomegaly. There is no tenderness.   Musculoskeletal: Normal range of motion. She exhibits no edema or tenderness.        Cervical back: Normal.        Thoracic back: Normal.        Lumbar back: Normal.   Lymphadenopathy:     She has no cervical adenopathy.     She has no axillary adenopathy.        Right: No supraclavicular adenopathy present.        Left: No supraclavicular adenopathy present.   Neurological: She is alert and oriented to person, place, and time. She has normal strength and normal reflexes. No sensory deficit. Gait normal.   Skin: Skin is warm. No rash noted. No cyanosis. No pallor. Nails show no clubbing.   Psychiatric: She has a normal mood and affect. Her speech is normal and behavior is normal. Cognition and memory are normal.        Assessment:     1. Coronary artery disease, occlusive    2. Atherosclerosis of aorta    3. Essential hypertension    4. Left ventricular diastolic dysfunction with preserved systolic function    5. Hyperlipidemia associated with type 2 diabetes mellitus      STABLE CV STATUS- NO ACTIVE MYOCARDIAL ISCHEMIA  NO ARRHYTHMIAS. NO ADHF  BP WELL CONTROLLED  CNS STATUS STABLE  CARD MED WELL TOLERATED  Plan:     Coronary artery disease, occlusive    Atherosclerosis of aorta    Essential hypertension    Left ventricular diastolic dysfunction with preserved systolic function    Hyperlipidemia associated with type 2 diabetes mellitus      1- CONTINUE PRESENT CARD MANAGEMENT    2- RETURN IN 6 MONTHS.

## 2018-03-26 ENCOUNTER — TELEPHONE (OUTPATIENT)
Dept: CARDIOLOGY | Facility: CLINIC | Age: 83
End: 2018-03-26

## 2018-03-26 NOTE — TELEPHONE ENCOUNTER
----- Message from Mati Gutierrez sent at 3/26/2018  2:44 PM CDT -----  Contact: pt  She's calling in regards to her scheduled appt, [facility], [phone] (home)

## 2018-04-19 ENCOUNTER — OFFICE VISIT (OUTPATIENT)
Dept: CARDIOLOGY | Facility: CLINIC | Age: 83
End: 2018-04-19
Payer: MEDICARE

## 2018-04-19 ENCOUNTER — TELEPHONE (OUTPATIENT)
Dept: CARDIOLOGY | Facility: CLINIC | Age: 83
End: 2018-04-19

## 2018-04-19 VITALS
WEIGHT: 180.13 LBS | DIASTOLIC BLOOD PRESSURE: 86 MMHG | SYSTOLIC BLOOD PRESSURE: 174 MMHG | BODY MASS INDEX: 30.75 KG/M2 | HEIGHT: 64 IN | HEART RATE: 102 BPM

## 2018-04-19 DIAGNOSIS — E78.5 HYPERLIPIDEMIA ASSOCIATED WITH TYPE 2 DIABETES MELLITUS: Chronic | ICD-10-CM

## 2018-04-19 DIAGNOSIS — R07.89 CHEST PAIN, ATYPICAL: Primary | ICD-10-CM

## 2018-04-19 DIAGNOSIS — E11.69 HYPERLIPIDEMIA ASSOCIATED WITH TYPE 2 DIABETES MELLITUS: Chronic | ICD-10-CM

## 2018-04-19 DIAGNOSIS — E11.9 DIABETES MELLITUS TYPE 2 WITHOUT RETINOPATHY: ICD-10-CM

## 2018-04-19 DIAGNOSIS — Z78.9 STATIN INTOLERANCE: ICD-10-CM

## 2018-04-19 DIAGNOSIS — I10 ESSENTIAL HYPERTENSION: Chronic | ICD-10-CM

## 2018-04-19 DIAGNOSIS — I25.10 CORONARY ARTERY DISEASE, OCCLUSIVE: Chronic | ICD-10-CM

## 2018-04-19 PROCEDURE — 99999 PR PBB SHADOW E&M-EST. PATIENT-LVL III: CPT | Mod: PBBFAC,,, | Performed by: INTERNAL MEDICINE

## 2018-04-19 PROCEDURE — 99214 OFFICE O/P EST MOD 30 MIN: CPT | Mod: S$GLB,,, | Performed by: INTERNAL MEDICINE

## 2018-04-19 PROCEDURE — 3079F DIAST BP 80-89 MM HG: CPT | Mod: CPTII,S$GLB,, | Performed by: INTERNAL MEDICINE

## 2018-04-19 PROCEDURE — 3077F SYST BP >= 140 MM HG: CPT | Mod: CPTII,S$GLB,, | Performed by: INTERNAL MEDICINE

## 2018-04-19 RX ORDER — CLOPIDOGREL BISULFATE 75 MG/1
75 TABLET ORAL DAILY
Qty: 30 TABLET | Refills: 11 | Status: SHIPPED | OUTPATIENT
Start: 2018-04-19 | End: 2018-06-14 | Stop reason: SINTOL

## 2018-04-19 RX ORDER — ISOSORBIDE MONONITRATE 60 MG/1
60 TABLET, EXTENDED RELEASE ORAL DAILY
Qty: 30 TABLET | Refills: 5 | Status: SHIPPED | OUTPATIENT
Start: 2018-04-19 | End: 2018-05-15 | Stop reason: SDUPTHER

## 2018-04-19 RX ORDER — LISINOPRIL 10 MG/1
10 TABLET ORAL DAILY
COMMUNITY
Start: 2018-04-16 | End: 2018-05-15 | Stop reason: SDUPTHER

## 2018-04-19 NOTE — TELEPHONE ENCOUNTER
Returned call, reviewed echo faxed and patient scheduled to see Dr. Lopez today, [facility] location at 1320.

## 2018-04-19 NOTE — PROGRESS NOTES
83 yo female, came in for HTN uncontrolled  PMH CAD s/p LHC in 2012 nonobstructive, HTN, HLD, NIDDM, and h/o CVA.  Echo in 2016 normal EF and LVH.  C/o lower sternal chest pain for few months while walking to mailbox, resolved after resting. The pain was not affected by the food. With dyspnea.   Now yard work was limited by the pain.  Plan to have EGD for potts esophagus  EKG NSR, PVC

## 2018-04-19 NOTE — PROGRESS NOTES
Subjective:   Patient ID:  Loida Holden is a 82 y.o. female who presents for follow up of Pre-op Exam and Carotid Artery Disease      83 yo female, came in for HTN uncontrolled. Followed at cardiology service.   PMH CAD s/p PCI remote, and repeat LHC in 2012 nonobstructive, HTN, HLD, NIDDM, and h/o CVA.  Echo in 2016 normal EF and LVH.  C/o lower sternal chest pain for few months while walking to mailbox, resolved after resting. The pain was not affected by the food. With dyspnea.   Now yard work was limited by the pain.  Pt also states that deep breathing made it tight.  Plan to have EGD for potts esophagus  EKG NSR, PVC.  Was on metoprolol 100 mg bid, started Lisinopril 10 mg daily three days ago,         Past Medical History:   Diagnosis Date    Angina pectoris     Anxiety     Arthritis     neck, back,     Brain cyst     x2    Colon polyp     Coronary artery disease     s/p PTCA    Diabetes mellitus type 2 without retinopathy     Diabetes type 2, controlled 2/17/2016    Diverticulitis of intestine without hemorrhage 11/10/2015    Dry senile macular degeneration     Ex-smoker 11/10/2015    Fatty liver     Gastritis     Gout     Herpes genitalia     Hiatal hernia     History of shingles     Hx of colonic polyps     8/21/09    Hyperlipidemia     Hypertension     Hypothyroid     Meningioma     6/14 mri dr cuevas    Obesity (BMI 30.0-34.9) 12/2/2015    Osteopenia 4/15 klever 4/17    Pneumonia     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 hx of seizures    Stroke     Tobacco dependence     Type 2 diabetes mellitus with stage 3 chronic kidney disease, without long-term current use of insulin     Type 2 diabetes mellitus without complication        Past Surgical History:   Procedure Laterality Date     CARDIAC CATHETERIZATION      [facility] 2-MRI safe to 3T    CATARACT EXTRACTION W/  INTRAOCULAR LENS IMPLANT  OD w/YAG    CATARACT EXTRACTION W/  INTRAOCULAR LENS IMPLANT  OS w/YAG    CHOLECYSTECTOMY      2/2012    CORONARY ANGIOPLASTY      HYSTERECTOMY  1970s       Social History   Substance Use Topics    Smoking status: Former Smoker     Packs/day: 0.50     Years: 30.00     Quit date: 1/1/2002    Smokeless tobacco: Never Used    Alcohol use No       Family History   Problem Relation Age of Onset    Heart disease Mother     Cancer Mother      breast    Liver disease Mother     Diabetes Maternal Grandfather     Psoriasis Maternal Grandmother     Heart disease Son     Colon cancer Neg Hx     Melanoma Neg Hx     Lupus Neg Hx     Eczema Neg Hx          Review of Systems   Constitution: Negative for decreased appetite, diaphoresis, fever, weakness, malaise/fatigue and night sweats.   HENT: Negative for nosebleeds.    Eyes: Negative for blurred vision and double vision.   Cardiovascular: Positive for chest pain and dyspnea on exertion. Negative for claudication, irregular heartbeat, leg swelling, near-syncope, orthopnea, palpitations, paroxysmal nocturnal dyspnea and syncope.   Respiratory: Negative for cough, shortness of breath, sleep disturbances due to breathing, snoring, sputum production and wheezing.    Endocrine: Negative for cold intolerance and polyuria.   Hematologic/Lymphatic: Does not bruise/bleed easily.   Skin: Negative for rash.   Musculoskeletal: Negative for back pain, falls, joint pain, joint swelling and neck pain.   Gastrointestinal: Negative for abdominal pain, heartburn, nausea and vomiting.   Genitourinary: Negative for dysuria, frequency and hematuria.   Neurological: Negative for difficulty with concentration, dizziness, focal weakness, headaches, light-headedness, numbness and seizures.   Psychiatric/Behavioral: Negative for depression, memory loss and substance abuse. The  patient does not have insomnia.    Allergic/Immunologic: Negative for HIV exposure and hives.       Objective:   Physical Exam   Constitutional: She is oriented to person, place, and time. She appears well-nourished.   HENT:   Head: Normocephalic.   Eyes: Pupils are equal, round, and reactive to light.   Neck: Normal carotid pulses and no JVD present. Carotid bruit is not present. No thyromegaly present.   Cardiovascular: Normal rate, regular rhythm, normal heart sounds and normal pulses.   No extrasystoles are present. PMI is not displaced.  Exam reveals no gallop and no S3.    No murmur heard.  Pulmonary/Chest: Breath sounds normal. No stridor. No respiratory distress.   Lower sternal positive tenderness   Abdominal: Soft. Bowel sounds are normal. There is no tenderness. There is no rebound.   Musculoskeletal: Normal range of motion.   Neurological: She is alert and oriented to person, place, and time.   Skin: Skin is intact. No rash noted.   Psychiatric: Her behavior is normal.       Lab Results   Component Value Date    CHOL 174 02/09/2017    CHOL 221 (H) 09/14/2016    CHOL 139 02/11/2016     Lab Results   Component Value Date    HDL 31 (L) 02/09/2017    HDL 36 (L) 09/14/2016    HDL 32 (L) 02/11/2016     Lab Results   Component Value Date    LDLCALC 100.2 02/09/2017    LDLCALC 131.2 09/14/2016    LDLCALC 78.4 02/11/2016     Lab Results   Component Value Date    TRIG 214 (H) 02/09/2017    TRIG 269 (H) 09/14/2016    TRIG 143 02/11/2016     Lab Results   Component Value Date    CHOLHDL 17.8 (L) 02/09/2017    CHOLHDL 16.3 (L) 09/14/2016    CHOLHDL 23.0 02/11/2016       Chemistry        Component Value Date/Time     10/26/2017 1151    K 3.5 10/26/2017 1151     10/26/2017 1151    CO2 32 (H) 10/26/2017 1151    BUN 15 10/26/2017 1151    CREATININE 1.1 10/26/2017 1151     (H) 10/26/2017 1151        Component Value Date/Time    CALCIUM 9.5 10/26/2017 1151    ALKPHOS 106 10/26/2017 1151    AST 33  10/26/2017 1151    ALT 32 10/26/2017 1151    BILITOT 0.7 10/26/2017 1151    ESTGFRAFRICA 54.4 (A) 10/26/2017 1151    EGFRNONAA 47.2 (A) 10/26/2017 1151          Lab Results   Component Value Date    TSH 4.159 (H) 10/26/2017     Lab Results   Component Value Date    INR 1.0 07/02/2012    INR 1.1 04/05/2006     Lab Results   Component Value Date    WBC 8.95 02/09/2017    HGB 14.9 02/09/2017    HCT 45.1 02/09/2017    MCV 97 02/09/2017     02/09/2017     BMP  Sodium   Date Value Ref Range Status   10/26/2017 143 136 - 145 mmol/L Final     Potassium   Date Value Ref Range Status   10/26/2017 3.5 3.5 - 5.1 mmol/L Final     Chloride   Date Value Ref Range Status   10/26/2017 102 95 - 110 mmol/L Final     CO2   Date Value Ref Range Status   10/26/2017 32 (H) 23 - 29 mmol/L Final     BUN, Bld   Date Value Ref Range Status   10/26/2017 15 8 - 23 mg/dL Final     Creatinine   Date Value Ref Range Status   10/26/2017 1.1 0.5 - 1.4 mg/dL Final     Calcium   Date Value Ref Range Status   10/26/2017 9.5 8.7 - 10.5 mg/dL Final     Anion Gap   Date Value Ref Range Status   10/26/2017 9 8 - 16 mmol/L Final     eGFR if    Date Value Ref Range Status   10/26/2017 54.4 (A) >60 mL/min/1.73 m^2 Final     eGFR if non    Date Value Ref Range Status   10/26/2017 47.2 (A) >60 mL/min/1.73 m^2 Final     Comment:     Calculation used to obtain the estimated glomerular filtration  rate (eGFR) is the CKD-EPI equation. Since race is unknown   in our information system, the eGFR values for   -American and Non--American patients are given   for each creatinine result.       CrCl cannot be calculated (Patient's most recent lab result is older than the maximum 7 days allowed.).     Assessment:      1. Chest pain, atypical    2. Coronary artery disease, occlusive    3. Essential hypertension    4. Hyperlipidemia associated with type 2 diabetes mellitus    5. Diabetes mellitus type 2 without retinopathy       Chest pain some mix picture of angina and sternal tenderness.  Not tolerate ASA    Plan:   Add Plavix 75 mg,   arrange LHC next week.  continue Metoprolol 100 mg bid, and Lisinopril 10 mg daily  Add Imdur 60 mg daily.  Check BP at home  RTC with Dr. Hough.

## 2018-04-19 NOTE — TELEPHONE ENCOUNTER
----- Message from Georgette Lewis LPN sent at 4/19/2018 11:27 AM CDT -----  Dr office called about [facility]  PT IS IN THEIR OFFICE NOW  Nurse Kennedi  Please call back [phone]

## 2018-04-23 ENCOUNTER — TELEPHONE (OUTPATIENT)
Dept: CARDIOLOGY | Facility: CLINIC | Age: 83
End: 2018-04-23

## 2018-04-23 DIAGNOSIS — Z88.8 ALLERGY TO IODINE: Primary | ICD-10-CM

## 2018-04-23 RX ORDER — FAMOTIDINE 40 MG/1
40 TABLET, FILM COATED ORAL DAILY PRN
COMMUNITY
End: 2018-04-23 | Stop reason: SDUPTHER

## 2018-04-23 RX ORDER — PREDNISONE 50 MG/1
50 TABLET ORAL DAILY PRN
COMMUNITY
End: 2018-04-23 | Stop reason: SDUPTHER

## 2018-04-23 NOTE — TELEPHONE ENCOUNTER
----- Message from Silvina Baker LPN sent at 4/23/2018  2:52 PM CDT -----  Contact: pt      ----- Message -----  From: Juana Aguilar  Sent: 4/23/2018   2:33 PM  To: [facility] Staff    Pt states she need to speak with [facility] about surgery, also have questions about medication.

## 2018-04-24 RX ORDER — PREDNISONE 50 MG/1
TABLET ORAL
Qty: 3 TABLET | Refills: 1 | Status: SHIPPED | OUTPATIENT
Start: 2018-04-24

## 2018-04-24 RX ORDER — FAMOTIDINE 40 MG/1
TABLET, FILM COATED ORAL
Qty: 3 TABLET | Refills: 1 | Status: SHIPPED | OUTPATIENT
Start: 2018-04-24

## 2018-04-25 ENCOUNTER — TELEPHONE (OUTPATIENT)
Dept: CARDIOLOGY | Facility: CLINIC | Age: 83
End: 2018-04-25

## 2018-04-25 NOTE — TELEPHONE ENCOUNTER
Approved.  auth [id number]    ----- Message from Corazon Ortiz sent at 4/25/2018  1:36 PM CDT -----  Contact: [facility]-  [place]  Requesting callback rg a current stress echo.Insurance claim cannot be approved without it. [phone]. Pls call at [phone].

## 2018-04-26 ENCOUNTER — HOSPITAL ENCOUNTER (INPATIENT)
Facility: HOSPITAL | Age: 83
LOS: 6 days | Discharge: NURSING, REHAB OR LONG-TERM CARE | DRG: 234 | End: 2018-05-02
Attending: INTERNAL MEDICINE | Admitting: INTERNAL MEDICINE
Payer: MEDICARE

## 2018-04-26 DIAGNOSIS — I25.10 CAD (CORONARY ARTERY DISEASE): ICD-10-CM

## 2018-04-26 DIAGNOSIS — Z99.11 ON MECHANICALLY ASSISTED VENTILATION: ICD-10-CM

## 2018-04-26 DIAGNOSIS — E11.65 TYPE 2 DIABETES MELLITUS WITH HYPERGLYCEMIA, WITHOUT LONG-TERM CURRENT USE OF INSULIN: ICD-10-CM

## 2018-04-26 DIAGNOSIS — N18.30 STAGE 3 CHRONIC KIDNEY DISEASE: ICD-10-CM

## 2018-04-26 DIAGNOSIS — I20.9 ANGINA PECTORIS: ICD-10-CM

## 2018-04-26 DIAGNOSIS — R06.02 SHORTNESS OF BREATH: ICD-10-CM

## 2018-04-26 DIAGNOSIS — N18.30 TYPE 2 DIABETES MELLITUS WITH STAGE 3 CHRONIC KIDNEY DISEASE, WITHOUT LONG-TERM CURRENT USE OF INSULIN: ICD-10-CM

## 2018-04-26 DIAGNOSIS — I25.10 CAD, MULTIPLE VESSEL: ICD-10-CM

## 2018-04-26 DIAGNOSIS — R09.02 HYPOXIA: ICD-10-CM

## 2018-04-26 DIAGNOSIS — E11.22 TYPE 2 DIABETES MELLITUS WITH STAGE 3 CHRONIC KIDNEY DISEASE, WITHOUT LONG-TERM CURRENT USE OF INSULIN: ICD-10-CM

## 2018-04-26 DIAGNOSIS — I25.119 ATHEROSCLEROTIC HEART DISEASE OF NATIVE CORONARY ARTERY WITH ANGINA PECTORIS: ICD-10-CM

## 2018-04-26 DIAGNOSIS — I20.0 UNSTABLE ANGINA: ICD-10-CM

## 2018-04-26 PROBLEM — I10 HYPERTENSION: Status: ACTIVE | Noted: 2018-04-26

## 2018-04-26 LAB
ABO + RH BLD: NORMAL
ALLENS TEST: ABNORMAL
ANION GAP SERPL CALC-SCNC: 12 MMOL/L
APTT BLDCRRT: 29.2 SEC
BLD GP AB SCN CELLS X3 SERPL QL: NORMAL
BUN SERPL-MCNC: 24 MG/DL
CALCIUM SERPL-MCNC: 9.5 MG/DL
CHLORIDE SERPL-SCNC: 100 MMOL/L
CO2 SERPL-SCNC: 23 MMOL/L
CORONARY STENOSIS: ABNORMAL
CREAT SERPL-MCNC: 1.5 MG/DL
DELSYS: ABNORMAL
ERYTHROCYTE [DISTWIDTH] IN BLOOD BY AUTOMATED COUNT: 14.1 %
EST. GFR  (AFRICAN AMERICAN): 37 ML/MIN/1.73 M^2
EST. GFR  (NON AFRICAN AMERICAN): 32 ML/MIN/1.73 M^2
ESTIMATED AVG GLUCOSE: 229 MG/DL
GLUCOSE SERPL-MCNC: 453 MG/DL
HBA1C MFR BLD HPLC: 9.6 %
HCO3 UR-SCNC: 23.3 MMOL/L (ref 24–28)
HCT VFR BLD AUTO: 42.2 %
HGB BLD-MCNC: 14.4 G/DL
INR PPP: 1
MCH RBC QN AUTO: 31.9 PG
MCHC RBC AUTO-ENTMCNC: 34.1 G/DL
MCV RBC AUTO: 94 FL
MODE: ABNORMAL
PCO2 BLDA: 31.7 MMHG (ref 35–45)
PH SMN: 7.47 [PH] (ref 7.35–7.45)
PLATELET # BLD AUTO: 152 K/UL
PMV BLD AUTO: 11 FL
PO2 BLDA: 76 MMHG (ref 80–100)
POC BE: 0 MMOL/L
POC SATURATED O2: 96 % (ref 95–100)
POCT GLUCOSE: 270 MG/DL (ref 70–110)
POCT GLUCOSE: 315 MG/DL (ref 70–110)
POTASSIUM SERPL-SCNC: 3.8 MMOL/L
PREALB SERPL-MCNC: 19 MG/DL
PROTHROMBIN TIME: 10.6 SEC
RBC # BLD AUTO: 4.51 M/UL
SAMPLE: ABNORMAL
SITE: ABNORMAL
SODIUM SERPL-SCNC: 135 MMOL/L
WBC # BLD AUTO: 9.39 K/UL

## 2018-04-26 PROCEDURE — 85027 COMPLETE CBC AUTOMATED: CPT

## 2018-04-26 PROCEDURE — 93010 ELECTROCARDIOGRAM REPORT: CPT | Mod: ,,, | Performed by: INTERNAL MEDICINE

## 2018-04-26 PROCEDURE — 85730 THROMBOPLASTIN TIME PARTIAL: CPT

## 2018-04-26 PROCEDURE — 99900035 HC TECH TIME PER 15 MIN (STAT)

## 2018-04-26 PROCEDURE — 25000003 PHARM REV CODE 250: Performed by: NURSE PRACTITIONER

## 2018-04-26 PROCEDURE — 63600175 PHARM REV CODE 636 W HCPCS: Performed by: INTERNAL MEDICINE

## 2018-04-26 PROCEDURE — 21400001 HC TELEMETRY ROOM

## 2018-04-26 PROCEDURE — 86920 COMPATIBILITY TEST SPIN: CPT

## 2018-04-26 PROCEDURE — 84134 ASSAY OF PREALBUMIN: CPT

## 2018-04-26 PROCEDURE — 4A023N7 MEASUREMENT OF CARDIAC SAMPLING AND PRESSURE, LEFT HEART, PERCUTANEOUS APPROACH: ICD-10-PCS | Performed by: INTERNAL MEDICINE

## 2018-04-26 PROCEDURE — 36415 COLL VENOUS BLD VENIPUNCTURE: CPT

## 2018-04-26 PROCEDURE — 25000003 PHARM REV CODE 250

## 2018-04-26 PROCEDURE — 63600175 PHARM REV CODE 636 W HCPCS

## 2018-04-26 PROCEDURE — 82803 BLOOD GASES ANY COMBINATION: CPT

## 2018-04-26 PROCEDURE — 99152 MOD SED SAME PHYS/QHP 5/>YRS: CPT

## 2018-04-26 PROCEDURE — 99152 MOD SED SAME PHYS/QHP 5/>YRS: CPT | Mod: ,,, | Performed by: INTERNAL MEDICINE

## 2018-04-26 PROCEDURE — 94799 UNLISTED PULMONARY SVC/PX: CPT

## 2018-04-26 PROCEDURE — 86901 BLOOD TYPING SEROLOGIC RH(D): CPT

## 2018-04-26 PROCEDURE — B2151ZZ FLUOROSCOPY OF LEFT HEART USING LOW OSMOLAR CONTRAST: ICD-10-PCS | Performed by: INTERNAL MEDICINE

## 2018-04-26 PROCEDURE — 25000003 PHARM REV CODE 250: Performed by: INTERNAL MEDICINE

## 2018-04-26 PROCEDURE — B3121ZZ FLUOROSCOPY OF LEFT SUBCLAVIAN ARTERY USING LOW OSMOLAR CONTRAST: ICD-10-PCS | Performed by: INTERNAL MEDICINE

## 2018-04-26 PROCEDURE — 87070 CULTURE OTHR SPECIMN AEROBIC: CPT

## 2018-04-26 PROCEDURE — 93458 L HRT ARTERY/VENTRICLE ANGIO: CPT | Mod: 26,,, | Performed by: INTERNAL MEDICINE

## 2018-04-26 PROCEDURE — 83036 HEMOGLOBIN GLYCOSYLATED A1C: CPT

## 2018-04-26 PROCEDURE — 80048 BASIC METABOLIC PNL TOTAL CA: CPT

## 2018-04-26 PROCEDURE — 99223 1ST HOSP IP/OBS HIGH 75: CPT | Mod: ,,, | Performed by: INTERNAL MEDICINE

## 2018-04-26 PROCEDURE — B2111ZZ FLUOROSCOPY OF MULTIPLE CORONARY ARTERIES USING LOW OSMOLAR CONTRAST: ICD-10-PCS | Performed by: INTERNAL MEDICINE

## 2018-04-26 PROCEDURE — 93458 L HRT ARTERY/VENTRICLE ANGIO: CPT

## 2018-04-26 PROCEDURE — 85610 PROTHROMBIN TIME: CPT

## 2018-04-26 PROCEDURE — 93005 ELECTROCARDIOGRAM TRACING: CPT

## 2018-04-26 PROCEDURE — 36600 WITHDRAWAL OF ARTERIAL BLOOD: CPT

## 2018-04-26 RX ORDER — METOPROLOL TARTRATE 25 MG/1
25 TABLET, FILM COATED ORAL
Status: DISCONTINUED | OUTPATIENT
Start: 2018-04-26 | End: 2018-04-27 | Stop reason: HOSPADM

## 2018-04-26 RX ORDER — METOPROLOL TARTRATE 50 MG/1
100 TABLET ORAL 2 TIMES DAILY
Status: DISCONTINUED | OUTPATIENT
Start: 2018-04-26 | End: 2018-04-27

## 2018-04-26 RX ORDER — DIAZEPAM 5 MG/1
5 TABLET ORAL
Status: COMPLETED | OUTPATIENT
Start: 2018-04-26 | End: 2018-04-26

## 2018-04-26 RX ORDER — ISOSORBIDE MONONITRATE 60 MG/1
60 TABLET, EXTENDED RELEASE ORAL DAILY
Status: DISCONTINUED | OUTPATIENT
Start: 2018-04-26 | End: 2018-04-27

## 2018-04-26 RX ORDER — HYDRALAZINE HYDROCHLORIDE 20 MG/ML
10 INJECTION INTRAMUSCULAR; INTRAVENOUS EVERY 4 HOURS PRN
Status: DISCONTINUED | OUTPATIENT
Start: 2018-04-26 | End: 2018-04-27

## 2018-04-26 RX ORDER — LEVOTHYROXINE SODIUM 50 UG/1
50 TABLET ORAL
Status: DISCONTINUED | OUTPATIENT
Start: 2018-04-27 | End: 2018-04-27

## 2018-04-26 RX ORDER — HYDROCODONE BITARTRATE AND ACETAMINOPHEN 500; 5 MG/1; MG/1
TABLET ORAL
Status: DISCONTINUED | OUTPATIENT
Start: 2018-04-26 | End: 2018-04-29

## 2018-04-26 RX ORDER — SODIUM CHLORIDE 9 MG/ML
INJECTION, SOLUTION INTRAVENOUS CONTINUOUS
Status: ACTIVE | OUTPATIENT
Start: 2018-04-26 | End: 2018-04-27

## 2018-04-26 RX ORDER — ONDANSETRON 8 MG/1
8 TABLET, ORALLY DISINTEGRATING ORAL EVERY 8 HOURS PRN
Status: DISCONTINUED | OUTPATIENT
Start: 2018-04-26 | End: 2018-04-27

## 2018-04-26 RX ORDER — GLUCAGON 1 MG
1 KIT INJECTION
Status: DISCONTINUED | OUTPATIENT
Start: 2018-04-26 | End: 2018-04-27

## 2018-04-26 RX ORDER — VANCOMYCIN/0.9 % SOD CHLORIDE 1 G/100 ML
1000 PLASTIC BAG, INJECTION (ML) INTRAVENOUS
Status: DISCONTINUED | OUTPATIENT
Start: 2018-04-27 | End: 2018-04-27 | Stop reason: HOSPADM

## 2018-04-26 RX ORDER — ISOSORBIDE MONONITRATE 60 MG/1
60 TABLET, EXTENDED RELEASE ORAL DAILY
Status: DISCONTINUED | OUTPATIENT
Start: 2018-04-27 | End: 2018-04-26

## 2018-04-26 RX ORDER — CHLORHEXIDINE GLUCONATE ORAL RINSE 1.2 MG/ML
10 SOLUTION DENTAL
Status: DISCONTINUED | OUTPATIENT
Start: 2018-04-26 | End: 2018-04-27 | Stop reason: HOSPADM

## 2018-04-26 RX ORDER — ASPIRIN 325 MG
325 TABLET ORAL ONCE
Status: DISCONTINUED | OUTPATIENT
Start: 2018-04-26 | End: 2018-04-26 | Stop reason: HOSPADM

## 2018-04-26 RX ORDER — INSULIN ASPART 100 [IU]/ML
1-10 INJECTION, SOLUTION INTRAVENOUS; SUBCUTANEOUS
Status: DISCONTINUED | OUTPATIENT
Start: 2018-04-26 | End: 2018-04-27

## 2018-04-26 RX ORDER — LISINOPRIL 10 MG/1
10 TABLET ORAL DAILY
Status: DISCONTINUED | OUTPATIENT
Start: 2018-04-27 | End: 2018-04-27

## 2018-04-26 RX ORDER — ENOXAPARIN SODIUM 100 MG/ML
40 INJECTION SUBCUTANEOUS EVERY 24 HOURS
Status: DISCONTINUED | OUTPATIENT
Start: 2018-04-26 | End: 2018-04-27

## 2018-04-26 RX ORDER — HYDROCODONE BITARTRATE AND ACETAMINOPHEN 500; 5 MG/1; MG/1
TABLET ORAL
Status: DISCONTINUED | OUTPATIENT
Start: 2018-04-26 | End: 2018-04-26 | Stop reason: SDUPTHER

## 2018-04-26 RX ORDER — VANCOMYCIN/0.9 % SOD CHLORIDE 1 G/100 ML
1000 PLASTIC BAG, INJECTION (ML) INTRAVENOUS
Status: DISCONTINUED | OUTPATIENT
Start: 2018-04-26 | End: 2018-04-26

## 2018-04-26 RX ORDER — IBUPROFEN 200 MG
24 TABLET ORAL
Status: DISCONTINUED | OUTPATIENT
Start: 2018-04-26 | End: 2018-04-27

## 2018-04-26 RX ORDER — SODIUM CHLORIDE 9 MG/ML
INJECTION, SOLUTION INTRAVENOUS CONTINUOUS
Status: DISCONTINUED | OUTPATIENT
Start: 2018-04-26 | End: 2018-04-26

## 2018-04-26 RX ORDER — PANTOPRAZOLE SODIUM 40 MG/1
40 TABLET, DELAYED RELEASE ORAL DAILY
Status: DISCONTINUED | OUTPATIENT
Start: 2018-04-27 | End: 2018-04-27

## 2018-04-26 RX ORDER — DIPHENHYDRAMINE HYDROCHLORIDE 50 MG/ML
25 INJECTION INTRAMUSCULAR; INTRAVENOUS EVERY 6 HOURS PRN
Status: DISCONTINUED | OUTPATIENT
Start: 2018-04-26 | End: 2018-04-27

## 2018-04-26 RX ORDER — ISOSORBIDE MONONITRATE 30 MG/1
30 TABLET, EXTENDED RELEASE ORAL DAILY
Status: DISCONTINUED | OUTPATIENT
Start: 2018-04-26 | End: 2018-04-26 | Stop reason: SDUPTHER

## 2018-04-26 RX ORDER — NITROGLYCERIN 0.4 MG/1
0.4 TABLET SUBLINGUAL EVERY 5 MIN PRN
Status: DISCONTINUED | OUTPATIENT
Start: 2018-04-26 | End: 2018-04-27

## 2018-04-26 RX ORDER — NAPROXEN SODIUM 220 MG/1
81 TABLET, FILM COATED ORAL DAILY
Status: DISCONTINUED | OUTPATIENT
Start: 2018-04-26 | End: 2018-04-27

## 2018-04-26 RX ORDER — CEFAZOLIN SODIUM 2 G/50ML
2 SOLUTION INTRAVENOUS
Status: DISCONTINUED | OUTPATIENT
Start: 2018-04-26 | End: 2018-04-27 | Stop reason: HOSPADM

## 2018-04-26 RX ORDER — SODIUM CHLORIDE 0.9 % (FLUSH) 0.9 %
3 SYRINGE (ML) INJECTION
Status: DISCONTINUED | OUTPATIENT
Start: 2018-04-26 | End: 2018-05-02 | Stop reason: HOSPADM

## 2018-04-26 RX ORDER — HYDROCODONE BITARTRATE AND ACETAMINOPHEN 500; 5 MG/1; MG/1
TABLET ORAL
Status: DISCONTINUED | OUTPATIENT
Start: 2018-04-26 | End: 2018-04-27

## 2018-04-26 RX ORDER — ALPRAZOLAM 0.25 MG/1
0.25 TABLET ORAL 3 TIMES DAILY PRN
Status: DISCONTINUED | OUTPATIENT
Start: 2018-04-26 | End: 2018-04-27

## 2018-04-26 RX ORDER — IBUPROFEN 200 MG
16 TABLET ORAL
Status: DISCONTINUED | OUTPATIENT
Start: 2018-04-26 | End: 2018-04-27

## 2018-04-26 RX ORDER — DIPHENHYDRAMINE HCL 50 MG
50 CAPSULE ORAL ONCE
Status: DISCONTINUED | OUTPATIENT
Start: 2018-04-26 | End: 2018-04-26 | Stop reason: HOSPADM

## 2018-04-26 RX ADMIN — ENOXAPARIN SODIUM 40 MG: 100 INJECTION SUBCUTANEOUS at 05:04

## 2018-04-26 RX ADMIN — SODIUM CHLORIDE: 0.9 INJECTION, SOLUTION INTRAVENOUS at 02:04

## 2018-04-26 RX ADMIN — HYDRALAZINE HYDROCHLORIDE 10 MG: 20 INJECTION INTRAMUSCULAR; INTRAVENOUS at 02:04

## 2018-04-26 RX ADMIN — ISOSORBIDE MONONITRATE 60 MG: 60 TABLET, EXTENDED RELEASE ORAL at 02:04

## 2018-04-26 RX ADMIN — INSULIN ASPART 3 UNITS: 100 INJECTION, SOLUTION INTRAVENOUS; SUBCUTANEOUS at 09:04

## 2018-04-26 RX ADMIN — INSULIN ASPART 8 UNITS: 100 INJECTION, SOLUTION INTRAVENOUS; SUBCUTANEOUS at 05:04

## 2018-04-26 RX ADMIN — ASPIRIN 81 MG CHEWABLE TABLET 81 MG: 81 TABLET CHEWABLE at 09:04

## 2018-04-26 RX ADMIN — DIAZEPAM 5 MG: 5 TABLET ORAL at 09:04

## 2018-04-26 RX ADMIN — ASPIRIN 81 MG CHEWABLE TABLET 81 MG: 81 TABLET CHEWABLE at 02:04

## 2018-04-26 RX ADMIN — SODIUM CHLORIDE: 9 INJECTION, SOLUTION INTRAVENOUS at 09:04

## 2018-04-26 RX ADMIN — METOPROLOL TARTRATE 100 MG: 50 TABLET ORAL at 09:04

## 2018-04-26 NOTE — NURSING
Dr. Mukherjee notified of critical glucose of 453. He states pt is on steroids for contrast allergy and no treatment ordered for now.

## 2018-04-26 NOTE — PLAN OF CARE
Met with patient at bedside for Discharge Assessment.  Patient states that she is scheduled to have a CABG tomorrow.  Patient reports that she resides alone in [place] and that her support system includes her son (who is employed full-time as  for [place]) and her daughter-in-law who is employed full-time with the 's Office.  Patient indicates that she will need assistance either at home or will need to go for therapy (PT/OT) following surgery here.  Patient also inquiring about possible transportation services to/from MD appointments, as her son is limited as to when he can take patient.  Discussed with patient that her insurance should be able to provide her with transportation to/from MD appointments (up to a certain monetary amount).  Informed patient that either this  or another  here will be able to obtain information from insurance regarding transportation.    Provided patient with [employer] packet, D/C Planning Brochure; information about [facility]; and information about Advance Directives.  Also, provided patient with this 's contact information.                [facility] [address] [place] [id number] [address] [address]  Phone: [phone] Fax: [phone]    [facility] [address] [address]  Phone: [phone] Fax: [phone]    YAKELIN Richardson MD  Payor: [employer] MEDICARE / Plan: [employer] MEDICARE HMO / Product Type: Capitation /         04/26/18 1615   Discharge Assessment   Assessment Type Discharge Planning Assessment   Confirmed/corrected address and phone number on facesheet? Yes   Assessment information obtained from? Patient;Medical Record   Expected Length of Stay (days) (TBD)   Communicated expected length of stay with patient/caregiver no    Prior to hospitilization cognitive status: Alert/Oriented   Prior to hospitalization functional status: Assistive Equipment   Current cognitive status: Alert/Oriented   Current Functional Status: Assistive Equipment   Facility Arrived From: Home   Lives With alone   Able to Return to Prior Arrangements yes   Is patient able to care for self after discharge? Unable to determine at this time (comments)   Who are your caregiver(s) and their phone number(s)? Jose Holden, Son: [phone]   Patient's perception of discharge disposition admitted as an inpatient;home health;skilled nursing facility   Readmission Within The Last 30 Days no previous admission in last 30 days   Patient currently being followed by outpatient case management? No   Patient currently receives any other outside agency services? No   Equipment Currently Used at Home rollator   Do you have any problems affording any of your prescribed medications? No   Is the patient taking medications as prescribed? yes   Does the patient have transportation home? Yes   Transportation Available family or friend will provide   Dialysis Name and Scheduled days N/A   Does the patient receive services at the [facility]? No   Discharge Plan A Home;Home Health   Discharge Plan B Skilled Nursing Facility   Patient/Family In Agreement With Plan yes   Does the patient have transportation to healthcare appointments? (Patient states that she has difficulty with getting to/from MD appointments and reports son takes her when he can )

## 2018-04-26 NOTE — OP NOTE
[facility] -   Cardiac Catheterization  Procedure Note    SUMMARY     Loida Holden  [id number]  T Arie Richardson MD    Date of Procedure: 4/26/2018    Procedure:   1. LHC  2. LEFT VENTRICULOGRAM  3. CORONARY ANGIOGRAM    Provider: Robb Mukherjee MD    Assisting Provider:  Herman Ziegler    Indications: She was referred for cardiac catheterization to further evaluate unstable angina.     Pre-Procedure Diagnosis:  Unstable angina    Post-Procedure Diagnosis:  Same + severe multivessel CAD    Anesthesia: RN IV Sedation    Description of the Findings of the Procedure:     The risks, benefits, complications, treatment options, and expected outcomes were discussed with the patient. The patient and/or family concurred with the proposed plan, giving informed consent. Patient was brought to the cath lab after IV hydration was begun and oral premedication was given.     Findings:  Severe multivessel CAD  Normal LVEF  Manual pressure L CFA  See report    Complications: None; patient tolerated the procedure well.    Estimated Blood Loss (EBL): Minimal           Implants: none    Specimens: none    Condition: stable    Disposition: PACU - hemodynamically stable.    Attestation: I was present and scrubbed for the entire procedure.     Recommendations:    Usual post cath care  Admit for unstable angina and CABG  CVT consult

## 2018-04-26 NOTE — NURSING TRANSFER
Nursing Transfer Note      4/26/2018     Transfer room 246    Transfer via stretcher    Transfer with BELONGINGS AND PORT MONITOR    Transported by KEITH ASDLER RN    Medicines sent: NONE    Chart send with patient: YES    Notified:SON AT BEDSIDE     Patient reassessed at: 4/26/18  1400    BEDSIDE REPORT TO     Upon arrival to floor: TRANSFERRED TO ROOM. TRANSFERRED TO BED.  TELEMETRY MONITER ON.  IV FLUIDS ON PUMP AT PRESCRIBED RATE.  PROCEDURAL ACCESS SITE WITHOUT BLEEDING OR HEMATOMA.  DRESSING DRY AND INTACT.  CARE HANDED OFF TO...

## 2018-04-26 NOTE — INTERVAL H&P NOTE
The patient has been examined and the H&P has been reviewed:    I concur with the findings and no changes have occurred since H&P was written.     DR BARNES, PRIMARY CARDIOLOGIST, REQUESTS [facility] TO REEVALUATE PTS CAD DUE TO WORSENING ANGINA.    Anesthesia/Surgery risks, benefits and alternative options discussed and understood by patient/family.          Active Hospital Problems    Diagnosis  POA    Unstable angina [I20.0]  Yes     Priority: High      Resolved Hospital Problems    Diagnosis Date Resolved POA   No resolved problems to display.

## 2018-04-26 NOTE — SUBJECTIVE & OBJECTIVE
Past Medical History:   Diagnosis Date    Angina pectoris     Anxiety     Arthritis     neck, back,     Brain cyst     x2    Colon polyp     Coronary artery disease     s/p PTCA    Diabetes mellitus type 2 without retinopathy     Diabetes type 2, controlled 2/17/2016    Diverticulitis of intestine without hemorrhage 11/10/2015    Dry senile macular degeneration     Ex-smoker 11/10/2015    Fatty liver     Gastritis     Gout     Herpes genitalia     Hiatal hernia     History of shingles     Hx of colonic polyps     8/21/09    Hyperlipidemia     Hypertension     Hypothyroid     Meningioma     6/14 mri dr cuevas    Obesity (BMI 30.0-34.9) 12/2/2015    Osteopenia 4/15 klever 4/17    Pneumonia     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 hx of seizures    Stroke     Tobacco dependence     Type 2 diabetes mellitus with stage 3 chronic kidney disease, without long-term current use of insulin     Type 2 diabetes mellitus without complication        Past Surgical History:   Procedure Laterality Date    CARDIAC CATHETERIZATION      [email or web address] 2-MRI safe to 3T    CATARACT EXTRACTION W/  INTRAOCULAR LENS IMPLANT  OD w/YAG    CATARACT EXTRACTION W/  INTRAOCULAR LENS IMPLANT  OS w/YAG    CHOLECYSTECTOMY      2/2012    CORONARY ANGIOPLASTY      HYSTERECTOMY  1970s       Review of patient's allergies indicates:   Allergen Reactions    Codeine      Other reaction(s): Unknown    Diprivan [propofol]     Doxycycline      Other reaction(s): Unknown    Iodine and iodide containing products      Other reaction(s): Unknown    Ivp dye  [iodinated contrast- oral and iv dye]      Other reaction(s): Unknown    Latex, natural rubber Hives    Metronidazole hcl      Other reaction(s): Unknown  Other reaction(s): Unknown     Pantoprazole      Difficulty urinating      Procaine      Other reaction(s): Unknown  Other reaction(s): Unknown    Propofol analogues Other (See Comments)     Passes out     Rosuvastatin      Other reaction(s): liver enlargement  Other reaction(s): liver enlargement    Simvastatin      Other reaction(s): Unknown  Liver problems      Sulfasalazine Other (See Comments)       No current facility-administered medications on file prior to encounter.      Current Outpatient Prescriptions on File Prior to Encounter   Medication Sig    allopurinol (ZYLOPRIM) 100 MG tablet TAKE ONE TABLET BY MOUTH ONCE DAILY    betamethasone valerate 0.1% (VALISONE) 0.1 % Crea APPLY TOPICALLY TWO TIMES DAILY    BIOTIN ORAL Take 5,000 mcg by mouth once daily.    calcium carbonate (TUMS) 200 mg calcium (500 mg) chewable tablet Take 1 tablet by mouth once daily.    clopidogrel (PLAVIX) 75 mg tablet Take 1 tablet (75 mg total) by mouth once daily.    furosemide (LASIX) 40 MG tablet Take 1 tablet (40 mg total) by mouth 2 (two) times daily.    isosorbide mononitrate (IMDUR) 60 MG 24 hr tablet Take 1 tablet (60 mg total) by mouth once daily.    levothyroxine (SYNTHROID) 50 MCG tablet TAKE ONE TABLET BY MOUTH ONCE DAILY BEFORE BREAKFAST    lisinopril 10 MG tablet Take 10 mg by mouth once daily.     MAG/ALUMINUM/SOD BICARB/ALGINC (GAVISCON ORAL) Take by mouth.    metoprolol tartrate (LOPRESSOR) 50 MG tablet Take 2 tablets (100 mg total) by mouth 2 (two) times daily.    pantoprazole (PROTONIX) 40 MG tablet     potassium chloride SA (K-DUR,KLOR-CON) 20 MEQ tablet Take 1 tablet (20 mEq total) by mouth 2 (two) times daily.    walker Misc Use rollator walker to reduce stress on knee    ACCU-CHEK NANNETTE PLUS METER [facility]     ACCU-CHEK SOFTCLIX LANCETS Misc     amoxicillin (AMOXIL) 875 MG tablet Take 875 mg by mouth 2 (two) times daily.    blood sugar diagnostic (TRUETEST TEST STRIPS) Strp 1 strip by Misc.(Non-Drug; Combo Route) route 3  (three) times daily. True test strips    lancets 33 gauge Misc 1 lancet by Misc.(Non-Drug; Combo Route) route 3 (three) times daily.     Family History     Problem Relation (Age of Onset)    Cancer Mother    Diabetes Maternal Grandfather    Heart disease Mother, Son    Liver disease Mother    Psoriasis Maternal Grandmother        Social History Main Topics    Smoking status: Former Smoker     Packs/day: 0.50     Years: 30.00     Quit date: 1/1/2002    Smokeless tobacco: Never Used    Alcohol use No    Drug use: No    Sexual activity: Not Currently     Review of Systems   Constitution: Negative for diaphoresis, weakness, malaise/fatigue, weight gain and weight loss.   HENT: Negative for congestion and nosebleeds.    Cardiovascular: Positive for chest pain and dyspnea on exertion. Negative for claudication, cyanosis, irregular heartbeat, leg swelling, near-syncope, orthopnea, palpitations, paroxysmal nocturnal dyspnea and syncope.   Respiratory: Positive for shortness of breath. Negative for cough, hemoptysis, sleep disturbances due to breathing, snoring, sputum production and wheezing.    Hematologic/Lymphatic: Negative for bleeding problem. Does not bruise/bleed easily.   Skin: Negative for rash.   Musculoskeletal: Negative for arthritis, back pain, falls, joint pain, muscle cramps and muscle weakness.   Gastrointestinal: Negative for abdominal pain, constipation, diarrhea, heartburn, hematemesis, hematochezia, melena and nausea.   Genitourinary: Negative for dysuria, hematuria and nocturia.   Neurological: Negative for excessive daytime sleepiness, dizziness, headaches, light-headedness, loss of balance, numbness and vertigo.     Objective:     Vital Signs (Most Recent):  Temp: 98 °F (36.7 °C) (04/26/18 1145)  Pulse: (!) 51 (04/26/18 1300)  Resp: 15 (04/26/18 1300)  BP: (!) 151/55 (04/26/18 1300)  SpO2: 97 % (04/26/18 1300) Vital Signs (24h Range):  Temp:  [98 °F (36.7 °C)] 98 °F (36.7 °C)  Pulse:  [51-66]  51  Resp:  [13-20] 15  SpO2:  [95 %-99 %] 97 %  BP: (151-184)/(55-81) 151/55     Weight: 80.7 kg (178 lb)  Body mass index is 30.55 kg/m².    SpO2: 97 %       No intake or output data in the 24 hours ending 04/26/18 1323    Lines/Drains/Airways     Peripheral Intravenous Line                 Peripheral IV - Single Lumen 04/26/18 0938 Left Wrist less than 1 day                Physical Exam   Constitutional: She is oriented to person, place, and time. She appears well-developed and well-nourished.   Neck: Neck supple. No JVD present.   Cardiovascular: Normal rate, regular rhythm, normal heart sounds and normal pulses.  Exam reveals no friction rub.    No murmur heard.  Pulmonary/Chest: Effort normal and breath sounds normal. No respiratory distress. She has no wheezes. She has no rales.   Abdominal: Soft. Bowel sounds are normal. She exhibits no distension.   Musculoskeletal: She exhibits no edema or tenderness.   Neurological: She is alert and oriented to person, place, and time.   Skin: Skin is warm and dry. No rash noted.   Left groin access site without redness, tenderness, swelling, or drainage. There is no active external bleeding or hematoma.    Psychiatric: She has a normal mood and affect. Her behavior is normal.   Nursing note and vitals reviewed.      Significant Labs:   All pertinent lab results from the last 24 hours have been reviewed. and   Recent Lab Results       04/26/18  1025 04/26/18  0935      Anion Gap  12     aPTT  29.2  Comment:  aPTT therapeutic range = 39-69 seconds     BUN, Bld  24(H)     Calcium  9.5     Chloride  100     CO2  23     Coronary Stenosis >= 50%(A)      Creatinine  1.5(H)     eGFR if   37(A)     eGFR if non   32  Comment:  Calculation used to obtain the estimated glomerular filtration  rate (eGFR) is the CKD-EPI equation.   (A)     Glucose  453  Comment:  Glucose critical result(s) called and verbal readback obtained   from Cat Aponte RN ,  04/26/2018 10:48  (HH)     Hematocrit  42.2     Hemoglobin  14.4     Coumadin Monitoring INR  1.0  Comment:  Coumadin Therapy:  2.0 - 3.0 for INR for all indicators except mechanical heart valves  and antiphospholipid syndromes which should use 2.5 - 3.5.       MCH  31.9(H)     MCHC  34.1     MCV  94     MPV  11.0     Platelets  152     Potassium  3.8     Protime  10.6     RBC  4.51     RDW  14.1     Sodium  135(L)     WBC  9.39           Significant Imaging: Echocardiogram:   2D echo with color flow doppler:   Results for orders placed or performed in visit on 01/21/16   2D echo with color flow doppler   Result Value Ref Range    EF 55 55 - 65    Diastolic Dysfunction Yes (A)     Est. PA Systolic Pressure 15.25     and X-Ray: CXR: X-Ray Chest 1 View (CXR): No results found for this visit on 04/26/18. and X-Ray Chest PA and Lateral (CXR): No results found for this visit on 04/26/18.

## 2018-04-26 NOTE — PLAN OF CARE
Problem: Patient Care Overview  Goal: Plan of Care Review  Outcome: Ongoing (interventions implemented as appropriate)  Received pt from [facility] via bed, bedrest still in effect. VS with BP elevated, isosorbidegiven PO, and PRN hydralazine given. Son at bedside, update given, POC discussed. No c/o discomfort

## 2018-04-26 NOTE — CONSULTS
[facility] - Consult    Exertional angina     HISTORY OF PRESENT ILLNESS:  83 yo lady with history of PCI and stenting who has had worsening exertional angina over the last year.  LHC done today demonstrated MVCAD.  Normal LVEF.      Past Medical History:   Diagnosis Date    Angina pectoris     Anxiety     Arthritis     neck, back,     Brain cyst     x2    Colon polyp     Coronary artery disease     s/p PTCA    Diabetes mellitus type 2 without retinopathy     Diabetes type 2, controlled 2/17/2016    Diverticulitis of intestine without hemorrhage 11/10/2015    Dry senile macular degeneration     Ex-smoker 11/10/2015    Fatty liver     Gastritis     Gout     Herpes genitalia     Hiatal hernia     History of shingles     Hx of colonic polyps     8/21/09    Hyperlipidemia     Hypertension     Hypothyroid     Meningioma     6/14 mri dr cuevas    Obesity (BMI 30.0-34.9) 12/2/2015    Osteopenia 4/15 klever 4/17    Pneumonia     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 hx of seizures    Stroke     Tobacco dependence     Type 2 diabetes mellitus with stage 3 chronic kidney disease, without long-term current use of insulin     Type 2 diabetes mellitus without complication        Past Surgical History:   Procedure Laterality Date    CARDIAC CATHETERIZATION      [facility] 2-MRI safe to 3T    CATARACT EXTRACTION W/  INTRAOCULAR LENS IMPLANT  OD w/YAG    CATARACT EXTRACTION W/  INTRAOCULAR LENS IMPLANT  OS w/YAG    CHOLECYSTECTOMY      2/2012    CORONARY ANGIOPLASTY      HYSTERECTOMY  1970s       Social History     Social History    Marital status:      Spouse name: N/A    Number of children: 1    Years of education: N/A     Occupational History    Not on file.     Social History Main Topics     Smoking status: Former Smoker     Packs/day: 0.50     Years: 30.00     Quit date: 1/1/2002    Smokeless tobacco: Never Used    Alcohol use No    Drug use: No    Sexual activity: Not Currently     Other Topics Concern    Are You Pregnant Or Think You May Be? No    Breast-Feeding No     Social History Narrative    No narrative on file       Family History:  Non contributory      Current Facility-Administered Medications:     0.9%  NaCl infusion, , Intravenous, Continuous, Robb Mukherjee MD, Last Rate: 75 mL/hr at 04/26/18 1500    ALPRAZolam tablet 0.25 mg, 0.25 mg, Oral, TID PRN, Robb Mukherjee MD    aspirin chewable tablet 81 mg, 81 mg, Oral, Daily, Hasmukh Lopez MD, 81 mg at 04/26/18 1458    dextrose 50% injection 12.5 g, 12.5 g, Intravenous, PRN, Robb Mukherjee MD    dextrose 50% injection 25 g, 25 g, Intravenous, PRN, Robb Mukherjee MD    diphenhydrAMINE injection 25 mg, 25 mg, Intravenous, Q6H PRN, Robb Mukherjee MD    enoxaparin injection 40 mg, 40 mg, Subcutaneous, Daily, Robb Mukherjee MD    glucagon (human recombinant) injection 1 mg, 1 mg, Intramuscular, PRN, Robb Mukherjee MD    glucose chewable tablet 16 g, 16 g, Oral, PRN, Robb Mukherjee MD    glucose chewable tablet 24 g, 24 g, Oral, PRN, Robb Mukherjee MD    hydrALAZINE injection 10 mg, 10 mg, Intravenous, Q4H PRN, Robb Mukherjee MD, 10 mg at 04/26/18 1459    insulin aspart U-100 pen 1-10 Units, 1-10 Units, Subcutaneous, QID (AC + HS) PRN, Robb Mukherjee MD    isosorbide mononitrate 24 hr tablet 60 mg, 60 mg, Oral, Daily, Robb Mukherjee MD, 60 mg at 04/26/18 1458    [START ON 4/27/2018] levothyroxine tablet 50 mcg, 50 mcg, Oral, Before breakfast, Robb Mukherjee MD    [START ON 4/27/2018] lisinopril tablet 10 mg, 10 mg, Oral, Daily, Robb Mukherjee MD    metoprolol tartrate (LOPRESSOR) tablet 100 mg, 100 mg, Oral, BID, Robb Mukherjee MD    nitroGLYCERIN SL tablet 0.4 mg, 0.4 mg, Sublingual, Q5 Min PRN, Robb Mukherjee,  MD    ondansetron disintegrating tablet 8 mg, 8 mg, Oral, Q8H PRN, Robb Mukherjee MD    [START ON 4/27/2018] pantoprazole EC tablet 40 mg, 40 mg, Oral, Daily, Robb Mukherjee MD    sodium chloride 0.9% flush 3 mL, 3 mL, Intravenous, PRN, Robb Mukherjee MD    Review of patient's allergies indicates:   Allergen Reactions    Codeine      Other reaction(s): Unknown    Diprivan [propofol]     Doxycycline      Other reaction(s): Unknown    Iodine and iodide containing products      Other reaction(s): Unknown    Ivp dye  [iodinated contrast- oral and iv dye]      Other reaction(s): Unknown    Latex, natural rubber Hives    Metronidazole hcl      Other reaction(s): Unknown  Other reaction(s): Unknown    Pantoprazole      Difficulty urinating      Procaine      Other reaction(s): Unknown  Other reaction(s): Unknown    Propofol analogues Other (See Comments)     Passes out     Rosuvastatin      Other reaction(s): liver enlargement  Other reaction(s): liver enlargement    Simvastatin      Other reaction(s): Unknown  Liver problems      Sulfasalazine Other (See Comments)       ROS    Vitals:    04/26/18 1500   BP: (!) 196/86   Pulse: (!) 54   Resp: 14   Temp: 97.5 °F (36.4 °C)       Objective     Cath demonstrates triple vessel disease.  Good LV function.    MDM    Assessment & Plan     Symptomatic MVCAD.  Normal LVEF.  Needs 2-3 bypasses.  Plan for surgery tomorrow.    Inocente Cam MD  4/26/2018

## 2018-04-26 NOTE — HPI
83 yo female with PMH CAD s/p PCI remote, and repeat LHC in 2012 nonobstructive, HTN, HLD, NIDDM, and h/o CVA. Echo in 2016 normal EF and LVH. Patient followed by [facility]. Seen in clinic earlier this month with complaints of lower sternal chest tightness for the past few months while walking to mailbox, resolved after resting. Also complained of chest pain after eating and associated with  dyspnea. Unable to do yard work due to pain. Patient underwent LHC today with Dr. Mukherjee and noted to have Severe multivessel CAD with Normal LVEF. CVT consulted for CABG

## 2018-04-26 NOTE — ASSESSMENT & PLAN NOTE
-Patient underwent LHC today to evaluate symptoms.   -Severe multivessel CAD with Normal LVEF  -CVT has been consulted and plan to proceed with CABG tomorrow.   -NPO after MN  -Continue ASA.  -Is allergic to Statins   -Resume Imdur   -No BB at this time due to bradycardia  -Will follow along post CABG

## 2018-04-26 NOTE — H&P
[facility] -   Cardiology  History and Physical     Patient Name: Loida Holden  MRN: [id number]  Admission Date: 4/26/2018  Code Status: No Order   Attending Provider: Hasmukh Lopez MD   Primary Care Physician: YAKELIN Richardson MD  Principal Problem:Unstable angina    Patient information was obtained from patient and ER records.     Subjective:     Chief Complaint:  Chest pain      HPI:  83 yo female with PMH CAD s/p PCI remote, and repeat LHC in 2012 nonobstructive, HTN, HLD, NIDDM, and h/o CVA. Echo in 2016 normal EF and LVH. Patient followed by [facility]. Seen in clinic earlier this month with complaints of lower sternal chest tightness for the past few months while walking to mailbox, resolved after resting. Also complained of chest pain after eating and associated with  dyspnea. Unable to do yard work due to pain. Patient underwent LHC today with Dr. Mukherjee and noted to have Severe multivessel CAD with Normal LVEF. CVT consulted for CABG     Past Medical History:   Diagnosis Date    Angina pectoris     Anxiety     Arthritis     neck, back,     Brain cyst     x2    Colon polyp     Coronary artery disease     s/p PTCA    Diabetes mellitus type 2 without retinopathy     Diabetes type 2, controlled 2/17/2016    Diverticulitis of intestine without hemorrhage 11/10/2015    Dry senile macular degeneration     Ex-smoker 11/10/2015    Fatty liver     Gastritis     Gout     Herpes genitalia     Hiatal hernia     History of shingles     Hx of colonic polyps     8/21/09    Hyperlipidemia     Hypertension     Hypothyroid     Meningioma     6/14 mri dr cuevas    Obesity (BMI 30.0-34.9) 12/2/2015    Osteopenia 4/15 klever 4/17    Pneumonia     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 hx of seizures     Stroke     Tobacco dependence     Type 2 diabetes mellitus with stage 3 chronic kidney disease, without long-term current use of insulin     Type 2 diabetes mellitus without complication        Past Surgical History:   Procedure Laterality Date    CARDIAC CATHETERIZATION      [facility] 2-MRI safe to 3T    CATARACT EXTRACTION W/  INTRAOCULAR LENS IMPLANT  OD w/YAG    CATARACT EXTRACTION W/  INTRAOCULAR LENS IMPLANT  OS w/YAG    CHOLECYSTECTOMY      2/2012    CORONARY ANGIOPLASTY      HYSTERECTOMY  1970s       Review of patient's allergies indicates:   Allergen Reactions    Codeine      Other reaction(s): Unknown    Diprivan [propofol]     Doxycycline      Other reaction(s): Unknown    Iodine and iodide containing products      Other reaction(s): Unknown    Ivp dye  [iodinated contrast- oral and iv dye]      Other reaction(s): Unknown    Latex, natural rubber Hives    Metronidazole hcl      Other reaction(s): Unknown  Other reaction(s): Unknown    Pantoprazole      Difficulty urinating      Procaine      Other reaction(s): Unknown  Other reaction(s): Unknown    Propofol analogues Other (See Comments)     Passes out     Rosuvastatin      Other reaction(s): liver enlargement  Other reaction(s): liver enlargement    Simvastatin      Other reaction(s): Unknown  Liver problems      Sulfasalazine Other (See Comments)       No current facility-administered medications on file prior to encounter.      Current Outpatient Prescriptions on File Prior to Encounter   Medication Sig    allopurinol (ZYLOPRIM) 100 MG tablet TAKE ONE TABLET BY MOUTH ONCE DAILY    betamethasone valerate 0.1% (VALISONE) 0.1 % Crea APPLY TOPICALLY TWO TIMES DAILY    BIOTIN ORAL Take 5,000 mcg by mouth once daily.    calcium carbonate (TUMS) 200 mg calcium (500 mg) chewable tablet Take 1 tablet by mouth once daily.    clopidogrel (PLAVIX) 75 mg tablet Take 1 tablet (75 mg total) by mouth once daily.    furosemide  (LASIX) 40 MG tablet Take 1 tablet (40 mg total) by mouth 2 (two) times daily.    isosorbide mononitrate (IMDUR) 60 MG 24 hr tablet Take 1 tablet (60 mg total) by mouth once daily.    levothyroxine (SYNTHROID) 50 MCG tablet TAKE ONE TABLET BY MOUTH ONCE DAILY BEFORE BREAKFAST    lisinopril 10 MG tablet Take 10 mg by mouth once daily.     MAG/ALUMINUM/SOD BICARB/ALGINC (GAVISCON ORAL) Take by mouth.    metoprolol tartrate (LOPRESSOR) 50 MG tablet Take 2 tablets (100 mg total) by mouth 2 (two) times daily.    pantoprazole (PROTONIX) 40 MG tablet     potassium chloride SA (K-DUR,KLOR-CON) 20 MEQ tablet Take 1 tablet (20 mEq total) by mouth 2 (two) times daily.    walker Misc Use rollator walker to reduce stress on knee    ACCU-CHEK NANNETTE PLUS METER Mis     ACCU-CHEK SOFTCLIX LANCETS Misc     amoxicillin (AMOXIL) 875 MG tablet Take 875 mg by mouth 2 (two) times daily.    blood sugar diagnostic (TRUETEST TEST STRIPS) Strp 1 strip by Misc.(Non-Drug; Combo Route) route 3 (three) times daily. True test strips    lancets 33 gauge Misc 1 lancet by Misc.(Non-Drug; Combo Route) route 3 (three) times daily.     Family History     Problem Relation (Age of Onset)    Cancer Mother    Diabetes Maternal Grandfather    Heart disease Mother, Son    Liver disease Mother    Psoriasis Maternal Grandmother        Social History Main Topics    Smoking status: Former Smoker     Packs/day: 0.50     Years: 30.00     Quit date: 1/1/2002    Smokeless tobacco: Never Used    Alcohol use No    Drug use: No    Sexual activity: Not Currently     Review of Systems   Constitution: Negative for diaphoresis, weakness, malaise/fatigue, weight gain and weight loss.   HENT: Negative for congestion and nosebleeds.    Cardiovascular: Positive for chest pain and dyspnea on exertion. Negative for claudication, cyanosis, irregular heartbeat, leg swelling, near-syncope, orthopnea, palpitations, paroxysmal nocturnal dyspnea and syncope.    Respiratory: Positive for shortness of breath. Negative for cough, hemoptysis, sleep disturbances due to breathing, snoring, sputum production and wheezing.    Hematologic/Lymphatic: Negative for bleeding problem. Does not bruise/bleed easily.   Skin: Negative for rash.   Musculoskeletal: Negative for arthritis, back pain, falls, joint pain, muscle cramps and muscle weakness.   Gastrointestinal: Negative for abdominal pain, constipation, diarrhea, heartburn, hematemesis, hematochezia, melena and nausea.   Genitourinary: Negative for dysuria, hematuria and nocturia.   Neurological: Negative for excessive daytime sleepiness, dizziness, headaches, light-headedness, loss of balance, numbness and vertigo.     Objective:     Vital Signs (Most Recent):  Temp: 98 °F (36.7 °C) (04/26/18 1145)  Pulse: (!) 51 (04/26/18 1300)  Resp: 15 (04/26/18 1300)  BP: (!) 151/55 (04/26/18 1300)  SpO2: 97 % (04/26/18 1300) Vital Signs (24h Range):  Temp:  [98 °F (36.7 °C)] 98 °F (36.7 °C)  Pulse:  [51-66] 51  Resp:  [13-20] 15  SpO2:  [95 %-99 %] 97 %  BP: (151-184)/(55-81) 151/55     Weight: 80.7 kg (178 lb)  Body mass index is 30.55 kg/m².    SpO2: 97 %       No intake or output data in the 24 hours ending 04/26/18 1323    Lines/Drains/Airways     Peripheral Intravenous Line                 Peripheral IV - Single Lumen 04/26/18 0938 Left Wrist less than 1 day                Physical Exam   Constitutional: She is oriented to person, place, and time. She appears well-developed and well-nourished.   Neck: Neck supple. No JVD present.   Cardiovascular: Normal rate, regular rhythm, normal heart sounds and normal pulses.  Exam reveals no friction rub.    No murmur heard.  Pulmonary/Chest: Effort normal and breath sounds normal. No respiratory distress. She has no wheezes. She has no rales.   Abdominal: Soft. Bowel sounds are normal. She exhibits no distension.   Musculoskeletal: She exhibits no edema or tenderness.   Neurological: She is  alert and oriented to person, place, and time.   Skin: Skin is warm and dry. No rash noted.   Left groin access site without redness, tenderness, swelling, or drainage. There is no active external bleeding or hematoma.    Psychiatric: She has a normal mood and affect. Her behavior is normal.   Nursing note and vitals reviewed.      Significant Labs:   All pertinent lab results from the last 24 hours have been reviewed. and   Recent Lab Results       04/26/18  1025 04/26/18  0935      Anion Gap  12     aPTT  29.2  Comment:  aPTT therapeutic range = 39-69 seconds     BUN, Bld  24(H)     Calcium  9.5     Chloride  100     CO2  23     Coronary Stenosis >= 50%(A)      Creatinine  1.5(H)     eGFR if   37(A)     eGFR if non   32  Comment:  Calculation used to obtain the estimated glomerular filtration  rate (eGFR) is the CKD-EPI equation.   (A)     Glucose  453  Comment:  Glucose critical result(s) called and verbal readback obtained   from Cat Aponte RN , 04/26/2018 10:48  (HH)     Hematocrit  42.2     Hemoglobin  14.4     Coumadin Monitoring INR  1.0  Comment:  Coumadin Therapy:  2.0 - 3.0 for INR for all indicators except mechanical heart valves  and antiphospholipid syndromes which should use 2.5 - 3.5.       MCH  31.9(H)     MCHC  34.1     MCV  94     MPV  11.0     Platelets  152     Potassium  3.8     Protime  10.6     RBC  4.51     RDW  14.1     Sodium  135(L)     WBC  9.39           Significant Imaging: Echocardiogram:   2D echo with color flow doppler:   Results for orders placed or performed in visit on 01/21/16   2D echo with color flow doppler   Result Value Ref Range    EF 55 55 - 65    Diastolic Dysfunction Yes (A)     Est. PA Systolic Pressure 15.25     and X-Ray: CXR: X-Ray Chest 1 View (CXR): No results found for this visit on 04/26/18. and X-Ray Chest PA and Lateral (CXR): No results found for this visit on 04/26/18.    Assessment and Plan:     * Unstable angina     -Patient underwent LHC today to evaluate symptoms.   -Severe multivessel CAD with Normal LVEF  -CVT has been consulted and plan to proceed with CABG tomorrow.   -NPO after MN  -Continue ASA.  -Is allergic to Statins   -Resume Imdur   -No BB at this time due to bradycardia  -Will follow along post CABG               VTE Risk Mitigation     None      Chart reviewed. Patient examined by Dr. Michelle and agrees with plan that has been outlined.       VEE Velazquez  Cardiology   [facility] [place]

## 2018-04-27 PROBLEM — N18.30 STAGE 3 CHRONIC KIDNEY DISEASE: Status: ACTIVE | Noted: 2018-04-27

## 2018-04-27 PROBLEM — Z99.11 ON MECHANICALLY ASSISTED VENTILATION: Status: ACTIVE | Noted: 2018-04-27

## 2018-04-27 LAB
ALBUMIN SERPL BCP-MCNC: 3.1 G/DL
ALP SERPL-CCNC: 90 U/L
ALT SERPL W/O P-5'-P-CCNC: 30 U/L
ANION GAP SERPL CALC-SCNC: 7 MMOL/L
ANION GAP SERPL CALC-SCNC: 7 MMOL/L
APTT BLDCRRT: 31.9 SEC
AST SERPL-CCNC: 33 U/L
BASOPHILS # BLD AUTO: 0.01 K/UL
BASOPHILS # BLD AUTO: 0.01 K/UL
BASOPHILS NFR BLD: 0.1 %
BASOPHILS NFR BLD: 0.1 %
BILIRUB SERPL-MCNC: 0.4 MG/DL
BLD PROD TYP BPU: NORMAL
BLOOD UNIT EXPIRATION DATE: NORMAL
BLOOD UNIT TYPE CODE: 5100
BLOOD UNIT TYPE CODE: 600
BLOOD UNIT TYPE CODE: 6200
BLOOD UNIT TYPE: NORMAL
BUN SERPL-MCNC: 16 MG/DL
BUN SERPL-MCNC: 20 MG/DL
CALCIUM SERPL-MCNC: 7.7 MG/DL
CALCIUM SERPL-MCNC: 8.5 MG/DL
CHLORIDE SERPL-SCNC: 109 MMOL/L
CHLORIDE SERPL-SCNC: 112 MMOL/L
CK SERPL-CCNC: 190 U/L
CO2 SERPL-SCNC: 24 MMOL/L
CO2 SERPL-SCNC: 25 MMOL/L
CODING SYSTEM: NORMAL
CREAT SERPL-MCNC: 0.9 MG/DL
CREAT SERPL-MCNC: 1.1 MG/DL
DELSYS: ABNORMAL
DIFFERENTIAL METHOD: ABNORMAL
DIFFERENTIAL METHOD: ABNORMAL
DISPENSE STATUS: NORMAL
EOSINOPHIL # BLD AUTO: 0.1 K/UL
EOSINOPHIL # BLD AUTO: 0.2 K/UL
EOSINOPHIL NFR BLD: 0.9 %
EOSINOPHIL NFR BLD: 1.2 %
ERYTHROCYTE [DISTWIDTH] IN BLOOD BY AUTOMATED COUNT: 14.7 %
ERYTHROCYTE [DISTWIDTH] IN BLOOD BY AUTOMATED COUNT: 15.1 %
ERYTHROCYTE [SEDIMENTATION RATE] IN BLOOD BY WESTERGREN METHOD: 12 MM/H
EST. GFR  (AFRICAN AMERICAN): 54 ML/MIN/1.73 M^2
EST. GFR  (AFRICAN AMERICAN): >60 ML/MIN/1.73 M^2
EST. GFR  (NON AFRICAN AMERICAN): 47 ML/MIN/1.73 M^2
EST. GFR  (NON AFRICAN AMERICAN): 60 ML/MIN/1.73 M^2
ESTIMATED PA SYSTOLIC PRESSURE: 22.89
FIBRINOGEN PPP-MCNC: 105 MG/DL
FIO2: 70
GLUCOSE SERPL-MCNC: 136 MG/DL (ref 70–110)
GLUCOSE SERPL-MCNC: 145 MG/DL (ref 70–110)
GLUCOSE SERPL-MCNC: 188 MG/DL (ref 70–110)
GLUCOSE SERPL-MCNC: 193 MG/DL
GLUCOSE SERPL-MCNC: 211 MG/DL (ref 70–110)
GLUCOSE SERPL-MCNC: 229 MG/DL (ref 70–110)
GLUCOSE SERPL-MCNC: 249 MG/DL
GLUCOSE SERPL-MCNC: 273 MG/DL (ref 70–110)
GLUCOSE SERPL-MCNC: 282 MG/DL (ref 70–110)
HCO3 UR-SCNC: 23.4 MMOL/L (ref 24–28)
HCO3 UR-SCNC: 23.5 MMOL/L (ref 24–28)
HCO3 UR-SCNC: 24.4 MMOL/L (ref 24–28)
HCO3 UR-SCNC: 25.2 MMOL/L (ref 24–28)
HCO3 UR-SCNC: 25.5 MMOL/L (ref 24–28)
HCO3 UR-SCNC: 27.3 MMOL/L (ref 24–28)
HCO3 UR-SCNC: 27.6 MMOL/L (ref 24–28)
HCO3 UR-SCNC: 27.7 MMOL/L (ref 24–28)
HCT VFR BLD AUTO: 24.6 %
HCT VFR BLD AUTO: 35.9 %
HCT VFR BLD AUTO: 37.2 %
HCT VFR BLD CALC: 19 %PCV (ref 36–54)
HCT VFR BLD CALC: 23 %PCV (ref 36–54)
HCT VFR BLD CALC: 25 %PCV (ref 36–54)
HCT VFR BLD CALC: 26 %PCV (ref 36–54)
HCT VFR BLD CALC: 26 %PCV (ref 36–54)
HCT VFR BLD CALC: 30 %PCV (ref 36–54)
HCT VFR BLD CALC: 35 %PCV (ref 36–54)
HGB BLD-MCNC: 11.8 G/DL
HGB BLD-MCNC: 12.4 G/DL
HGB BLD-MCNC: 8.4 G/DL
INR PPP: 1.4
LYMPHOCYTES # BLD AUTO: 1.4 K/UL
LYMPHOCYTES # BLD AUTO: 1.6 K/UL
LYMPHOCYTES NFR BLD: 12.8 %
LYMPHOCYTES NFR BLD: 20 %
MAGNESIUM SERPL-MCNC: 1.6 MG/DL
MCH RBC QN AUTO: 31.2 PG
MCH RBC QN AUTO: 32.1 PG
MCHC RBC AUTO-ENTMCNC: 32.9 G/DL
MCHC RBC AUTO-ENTMCNC: 34.1 G/DL
MCV RBC AUTO: 94 FL
MCV RBC AUTO: 95 FL
MITRAL VALVE MOBILITY: NORMAL
MITRAL VALVE REGURGITATION: NORMAL
MODE: ABNORMAL
MONOCYTES # BLD AUTO: 0.6 K/UL
MONOCYTES # BLD AUTO: 1.4 K/UL
MONOCYTES NFR BLD: 11.2 %
MONOCYTES NFR BLD: 8.3 %
NEUTROPHILS # BLD AUTO: 4.9 K/UL
NEUTROPHILS # BLD AUTO: 9.4 K/UL
NEUTROPHILS NFR BLD: 70.7 %
NEUTROPHILS NFR BLD: 74.7 %
NUM UNITS TRANS PACKED RBC: NORMAL
NUM UNITS TRANS PACKED RBC: NORMAL
PCO2 BLDA: 34.2 MMHG (ref 35–45)
PCO2 BLDA: 34.8 MMHG (ref 35–45)
PCO2 BLDA: 38.7 MMHG (ref 35–45)
PCO2 BLDA: 39 MMHG (ref 35–45)
PCO2 BLDA: 41.8 MMHG (ref 35–45)
PCO2 BLDA: 42.1 MMHG (ref 35–45)
PCO2 BLDA: 45.5 MMHG (ref 35–45)
PCO2 BLDA: 46 MMHG (ref 35–45)
PEEP: 5
PH SMN: 7.34 [PH] (ref 7.35–7.45)
PH SMN: 7.36 [PH] (ref 7.35–7.45)
PH SMN: 7.39 [PH] (ref 7.35–7.45)
PH SMN: 7.39 [PH] (ref 7.35–7.45)
PH SMN: 7.42 [PH] (ref 7.35–7.45)
PH SMN: 7.43 [PH] (ref 7.35–7.45)
PH SMN: 7.46 [PH] (ref 7.35–7.45)
PH SMN: 7.52 [PH] (ref 7.35–7.45)
PHOSPHATE SERPL-MCNC: 2.4 MG/DL
PLATELET # BLD AUTO: 113 K/UL
PLATELET # BLD AUTO: 114 K/UL
PLATELET # BLD AUTO: 161 K/UL
PMV BLD AUTO: 10.1 FL
PMV BLD AUTO: 10.2 FL
PMV BLD AUTO: 10.5 FL
PO2 BLDA: 133 MMHG (ref 80–100)
PO2 BLDA: 267 MMHG (ref 80–100)
PO2 BLDA: 293 MMHG (ref 80–100)
PO2 BLDA: 325 MMHG (ref 80–100)
PO2 BLDA: 346 MMHG (ref 80–100)
PO2 BLDA: 37 MMHG (ref 40–60)
PO2 BLDA: 373 MMHG (ref 80–100)
PO2 BLDA: 75 MMHG (ref 80–100)
POC ACTIVATED CLOTTING TIME K: 109 SEC (ref 74–137)
POC ACTIVATED CLOTTING TIME K: 131 SEC (ref 74–137)
POC ACTIVATED CLOTTING TIME K: 411 SEC (ref 74–137)
POC ACTIVATED CLOTTING TIME K: 428 SEC (ref 74–137)
POC ACTIVATED CLOTTING TIME K: 433 SEC (ref 74–137)
POC ACTIVATED CLOTTING TIME K: 527 SEC (ref 74–137)
POC BE: -1 MMOL/L
POC BE: -1 MMOL/L
POC BE: -2 MMOL/L
POC BE: 1 MMOL/L
POC BE: 1 MMOL/L
POC BE: 3 MMOL/L
POC BE: 3 MMOL/L
POC BE: 5 MMOL/L
POC IONIZED CALCIUM: 0.94 MMOL/L (ref 1.06–1.42)
POC IONIZED CALCIUM: 1.05 MMOL/L (ref 1.06–1.42)
POC IONIZED CALCIUM: 1.09 MMOL/L (ref 1.06–1.42)
POC IONIZED CALCIUM: 1.09 MMOL/L (ref 1.06–1.42)
POC IONIZED CALCIUM: 1.15 MMOL/L (ref 1.06–1.42)
POC IONIZED CALCIUM: 1.23 MMOL/L (ref 1.06–1.42)
POC IONIZED CALCIUM: 1.34 MMOL/L (ref 1.06–1.42)
POC SATURATED O2: 100 % (ref 95–100)
POC SATURATED O2: 66 % (ref 95–100)
POC SATURATED O2: 96 % (ref 95–100)
POC SATURATED O2: 99 % (ref 95–100)
POCT GLUCOSE: 117 MG/DL (ref 70–110)
POCT GLUCOSE: 156 MG/DL (ref 70–110)
POCT GLUCOSE: 164 MG/DL (ref 70–110)
POCT GLUCOSE: 178 MG/DL (ref 70–110)
POCT GLUCOSE: 179 MG/DL (ref 70–110)
POCT GLUCOSE: 187 MG/DL (ref 70–110)
POTASSIUM BLD-SCNC: 3 MMOL/L (ref 3.5–5.1)
POTASSIUM BLD-SCNC: 3 MMOL/L (ref 3.5–5.1)
POTASSIUM BLD-SCNC: 3.1 MMOL/L (ref 3.5–5.1)
POTASSIUM BLD-SCNC: 3.2 MMOL/L (ref 3.5–5.1)
POTASSIUM BLD-SCNC: 3.2 MMOL/L (ref 3.5–5.1)
POTASSIUM BLD-SCNC: 3.3 MMOL/L (ref 3.5–5.1)
POTASSIUM BLD-SCNC: 3.4 MMOL/L (ref 3.5–5.1)
POTASSIUM SERPL-SCNC: 3.1 MMOL/L
POTASSIUM SERPL-SCNC: 3.6 MMOL/L
PRE FEV1 FVC: 77.21 % (ref 61.81–89.79)
PRE FEV1: 1.51 L (ref 1.3–2.42)
PRE FVC: 1.96 L (ref 1.72–3.28)
PRE PEF: 4.18 L/S
PROT SERPL-MCNC: 5.8 G/DL
PROTHROMBIN TIME: 14.3 SEC
PS: 10
RBC # BLD AUTO: 2.62 M/UL
RBC # BLD AUTO: 3.78 M/UL
RETIRED EF AND QEF - SEE NOTES: 60 (ref 55–65)
SAMPLE: ABNORMAL
SAMPLE: NORMAL
SAMPLE: NORMAL
SITE: ABNORMAL
SODIUM BLD-SCNC: 142 MMOL/L (ref 136–145)
SODIUM BLD-SCNC: 143 MMOL/L (ref 136–145)
SODIUM BLD-SCNC: 144 MMOL/L (ref 136–145)
SODIUM BLD-SCNC: 145 MMOL/L (ref 136–145)
SODIUM BLD-SCNC: 146 MMOL/L (ref 136–145)
SODIUM SERPL-SCNC: 141 MMOL/L
SODIUM SERPL-SCNC: 143 MMOL/L
TRANS PLATPHERESIS VOL PATIENT: NORMAL ML
TRICUSPID VALVE REGURGITATION: NORMAL
VT: 500
WBC # BLD AUTO: 12.55 K/UL
WBC # BLD AUTO: 6.89 K/UL

## 2018-04-27 PROCEDURE — 25000003 PHARM REV CODE 250: Performed by: THORACIC SURGERY (CARDIOTHORACIC VASCULAR SURGERY)

## 2018-04-27 PROCEDURE — 63600175 PHARM REV CODE 636 W HCPCS: Performed by: INTERNAL MEDICINE

## 2018-04-27 PROCEDURE — C1769 GUIDE WIRE: HCPCS | Performed by: THORACIC SURGERY (CARDIOTHORACIC VASCULAR SURGERY)

## 2018-04-27 PROCEDURE — 37000009 HC ANESTHESIA EA ADD 15 MINS: Performed by: THORACIC SURGERY (CARDIOTHORACIC VASCULAR SURGERY)

## 2018-04-27 PROCEDURE — 80048 BASIC METABOLIC PNL TOTAL CA: CPT

## 2018-04-27 PROCEDURE — 85384 FIBRINOGEN ACTIVITY: CPT

## 2018-04-27 PROCEDURE — 83735 ASSAY OF MAGNESIUM: CPT

## 2018-04-27 PROCEDURE — 80053 COMPREHEN METABOLIC PANEL: CPT

## 2018-04-27 PROCEDURE — 71000028 HC RECOVERY HIGH ACUITY/ PER HOUR

## 2018-04-27 PROCEDURE — 93306 TTE W/DOPPLER COMPLETE: CPT | Mod: 26,,, | Performed by: INTERNAL MEDICINE

## 2018-04-27 PROCEDURE — 27201423 OPTIME MED/SURG SUP & DEVICES STERILE SUPPLY: Performed by: THORACIC SURGERY (CARDIOTHORACIC VASCULAR SURGERY)

## 2018-04-27 PROCEDURE — 36415 COLL VENOUS BLD VENIPUNCTURE: CPT

## 2018-04-27 PROCEDURE — 85610 PROTHROMBIN TIME: CPT

## 2018-04-27 PROCEDURE — 85730 THROMBOPLASTIN TIME PARTIAL: CPT

## 2018-04-27 PROCEDURE — P9035 PLATELET PHERES LEUKOREDUCED: HCPCS

## 2018-04-27 PROCEDURE — 5A1221Z PERFORMANCE OF CARDIAC OUTPUT, CONTINUOUS: ICD-10-PCS | Performed by: THORACIC SURGERY (CARDIOTHORACIC VASCULAR SURGERY)

## 2018-04-27 PROCEDURE — 63600175 PHARM REV CODE 636 W HCPCS: Performed by: PHYSICIAN ASSISTANT

## 2018-04-27 PROCEDURE — 99900035 HC TECH TIME PER 15 MIN (STAT)

## 2018-04-27 PROCEDURE — 94002 VENT MGMT INPAT INIT DAY: CPT

## 2018-04-27 PROCEDURE — 82550 ASSAY OF CK (CPK): CPT

## 2018-04-27 PROCEDURE — 85014 HEMATOCRIT: CPT

## 2018-04-27 PROCEDURE — 36000713 HC OR TIME LEV V EA ADD 15 MIN: Performed by: THORACIC SURGERY (CARDIOTHORACIC VASCULAR SURGERY)

## 2018-04-27 PROCEDURE — 25000003 PHARM REV CODE 250: Performed by: PHYSICIAN ASSISTANT

## 2018-04-27 PROCEDURE — 84100 ASSAY OF PHOSPHORUS: CPT

## 2018-04-27 PROCEDURE — 25000003 PHARM REV CODE 250: Performed by: INTERNAL MEDICINE

## 2018-04-27 PROCEDURE — 02100Z9 BYPASS CORONARY ARTERY, ONE ARTERY FROM LEFT INTERNAL MAMMARY, OPEN APPROACH: ICD-10-PCS | Performed by: THORACIC SURGERY (CARDIOTHORACIC VASCULAR SURGERY)

## 2018-04-27 PROCEDURE — 85018 HEMOGLOBIN: CPT

## 2018-04-27 PROCEDURE — 85049 AUTOMATED PLATELET COUNT: CPT

## 2018-04-27 PROCEDURE — 99233 SBSQ HOSP IP/OBS HIGH 50: CPT | Mod: ,,, | Performed by: INTERNAL MEDICINE

## 2018-04-27 PROCEDURE — 37000008 HC ANESTHESIA 1ST 15 MINUTES: Performed by: THORACIC SURGERY (CARDIOTHORACIC VASCULAR SURGERY)

## 2018-04-27 PROCEDURE — C1729 CATH, DRAINAGE: HCPCS | Performed by: THORACIC SURGERY (CARDIOTHORACIC VASCULAR SURGERY)

## 2018-04-27 PROCEDURE — 37799 UNLISTED PX VASCULAR SURGERY: CPT

## 2018-04-27 PROCEDURE — 36000712 HC OR TIME LEV V 1ST 15 MIN: Performed by: THORACIC SURGERY (CARDIOTHORACIC VASCULAR SURGERY)

## 2018-04-27 PROCEDURE — 85025 COMPLETE CBC W/AUTO DIFF WBC: CPT | Mod: 91

## 2018-04-27 PROCEDURE — 94799 UNLISTED PULMONARY SVC/PX: CPT

## 2018-04-27 PROCEDURE — 20000000 HC ICU ROOM

## 2018-04-27 PROCEDURE — 94010 BREATHING CAPACITY TEST: CPT | Performed by: GENERAL PRACTICE

## 2018-04-27 PROCEDURE — 021009W BYPASS CORONARY ARTERY, ONE ARTERY FROM AORTA WITH AUTOLOGOUS VENOUS TISSUE, OPEN APPROACH: ICD-10-PCS | Performed by: THORACIC SURGERY (CARDIOTHORACIC VASCULAR SURGERY)

## 2018-04-27 PROCEDURE — 06BQ4ZZ EXCISION OF LEFT SAPHENOUS VEIN, PERCUTANEOUS ENDOSCOPIC APPROACH: ICD-10-PCS | Performed by: THORACIC SURGERY (CARDIOTHORACIC VASCULAR SURGERY)

## 2018-04-27 PROCEDURE — 99291 CRITICAL CARE FIRST HOUR: CPT | Mod: ,,, | Performed by: NURSE PRACTITIONER

## 2018-04-27 PROCEDURE — 63600175 PHARM REV CODE 636 W HCPCS: Performed by: THORACIC SURGERY (CARDIOTHORACIC VASCULAR SURGERY)

## 2018-04-27 PROCEDURE — 93306 TTE W/DOPPLER COMPLETE: CPT

## 2018-04-27 PROCEDURE — P9016 RBC LEUKOCYTES REDUCED: HCPCS

## 2018-04-27 PROCEDURE — 82803 BLOOD GASES ANY COMBINATION: CPT

## 2018-04-27 RX ORDER — MORPHINE SULFATE 2 MG/ML
2 INJECTION, SOLUTION INTRAMUSCULAR; INTRAVENOUS
Status: DISCONTINUED | OUTPATIENT
Start: 2018-04-27 | End: 2018-04-27

## 2018-04-27 RX ORDER — SODIUM CHLORIDE, SODIUM LACTATE, POTASSIUM CHLORIDE, CALCIUM CHLORIDE 600; 310; 30; 20 MG/100ML; MG/100ML; MG/100ML; MG/100ML
1000 INJECTION, SOLUTION INTRAVENOUS
Status: DISCONTINUED | OUTPATIENT
Start: 2018-04-27 | End: 2018-04-29

## 2018-04-27 RX ORDER — ACETAMINOPHEN 325 MG/1
650 TABLET ORAL EVERY 6 HOURS PRN
Status: DISCONTINUED | OUTPATIENT
Start: 2018-04-27 | End: 2018-04-28

## 2018-04-27 RX ORDER — CEFAZOLIN SODIUM 1 G/3ML
2 INJECTION, POWDER, FOR SOLUTION INTRAMUSCULAR; INTRAVENOUS
Status: COMPLETED | OUTPATIENT
Start: 2018-04-28 | End: 2018-04-28

## 2018-04-27 RX ORDER — HEPARIN SODIUM 1000 [USP'U]/ML
INJECTION, SOLUTION INTRAVENOUS; SUBCUTANEOUS
Status: DISCONTINUED | OUTPATIENT
Start: 2018-04-27 | End: 2018-04-27 | Stop reason: HOSPADM

## 2018-04-27 RX ORDER — MIDAZOLAM HYDROCHLORIDE 1 MG/ML
2 INJECTION INTRAMUSCULAR; INTRAVENOUS
Status: DISCONTINUED | OUTPATIENT
Start: 2018-04-27 | End: 2018-04-29

## 2018-04-27 RX ORDER — OXYCODONE HYDROCHLORIDE 5 MG/1
5 TABLET ORAL EVERY 6 HOURS PRN
Status: DISCONTINUED | OUTPATIENT
Start: 2018-04-27 | End: 2018-04-28

## 2018-04-27 RX ORDER — FAMOTIDINE 10 MG/ML
20 INJECTION INTRAVENOUS DAILY
Status: DISCONTINUED | OUTPATIENT
Start: 2018-04-28 | End: 2018-04-30

## 2018-04-27 RX ORDER — ALBUMIN HUMAN 50 G/1000ML
250 SOLUTION INTRAVENOUS
Status: DISCONTINUED | OUTPATIENT
Start: 2018-04-27 | End: 2018-04-29

## 2018-04-27 RX ORDER — POTASSIUM CHLORIDE 14.9 MG/ML
60 INJECTION INTRAVENOUS
Status: DISCONTINUED | OUTPATIENT
Start: 2018-04-27 | End: 2018-04-29

## 2018-04-27 RX ORDER — BACITRACIN 50000 [IU]/1
INJECTION, POWDER, FOR SOLUTION INTRAMUSCULAR
Status: DISCONTINUED | OUTPATIENT
Start: 2018-04-27 | End: 2018-04-27 | Stop reason: HOSPADM

## 2018-04-27 RX ORDER — MIDAZOLAM HYDROCHLORIDE 2 MG/2ML
1 INJECTION, SOLUTION INTRAMUSCULAR; INTRAVENOUS
Status: DISCONTINUED | OUTPATIENT
Start: 2018-04-27 | End: 2018-04-27

## 2018-04-27 RX ORDER — FENTANYL CITRATE 50 UG/ML
100 INJECTION, SOLUTION INTRAMUSCULAR; INTRAVENOUS
Status: DISCONTINUED | OUTPATIENT
Start: 2018-04-27 | End: 2018-04-28

## 2018-04-27 RX ORDER — ONDANSETRON 2 MG/ML
4 INJECTION INTRAMUSCULAR; INTRAVENOUS EVERY 12 HOURS PRN
Status: DISCONTINUED | OUTPATIENT
Start: 2018-04-27 | End: 2018-05-02 | Stop reason: HOSPADM

## 2018-04-27 RX ORDER — POTASSIUM CHLORIDE 14.9 MG/ML
20 INJECTION INTRAVENOUS
Status: DISCONTINUED | OUTPATIENT
Start: 2018-04-27 | End: 2018-04-29

## 2018-04-27 RX ORDER — MAGNESIUM SULFATE HEPTAHYDRATE 40 MG/ML
2 INJECTION, SOLUTION INTRAVENOUS
Status: DISCONTINUED | OUTPATIENT
Start: 2018-04-27 | End: 2018-04-29

## 2018-04-27 RX ORDER — ASPIRIN 81 MG/1
81 TABLET ORAL DAILY
Status: DISCONTINUED | OUTPATIENT
Start: 2018-04-28 | End: 2018-05-02 | Stop reason: HOSPADM

## 2018-04-27 RX ORDER — POTASSIUM CHLORIDE 20 MEQ/1
40 TABLET, EXTENDED RELEASE ORAL EVERY 12 HOURS
Status: DISCONTINUED | OUTPATIENT
Start: 2018-04-28 | End: 2018-04-29

## 2018-04-27 RX ORDER — HYDROCODONE BITARTRATE AND ACETAMINOPHEN 500; 5 MG/1; MG/1
TABLET ORAL
Status: DISCONTINUED | OUTPATIENT
Start: 2018-04-27 | End: 2018-04-27 | Stop reason: HOSPADM

## 2018-04-27 RX ORDER — NICARDIPINE HYDROCHLORIDE 0.2 MG/ML
5 INJECTION INTRAVENOUS CONTINUOUS PRN
Status: DISCONTINUED | OUTPATIENT
Start: 2018-04-27 | End: 2018-04-29

## 2018-04-27 RX ORDER — DEXMEDETOMIDINE HYDROCHLORIDE 4 UG/ML
0.2 INJECTION, SOLUTION INTRAVENOUS CONTINUOUS PRN
Status: DISCONTINUED | OUTPATIENT
Start: 2018-04-27 | End: 2018-04-28

## 2018-04-27 RX ORDER — HYDROCODONE BITARTRATE AND ACETAMINOPHEN 500; 5 MG/1; MG/1
TABLET ORAL
Status: DISCONTINUED | OUTPATIENT
Start: 2018-04-27 | End: 2018-04-29

## 2018-04-27 RX ORDER — DEXTROSE MONOHYDRATE AND SODIUM CHLORIDE 5; .225 G/100ML; G/100ML
50 INJECTION, SOLUTION INTRAVENOUS CONTINUOUS
Status: DISCONTINUED | OUTPATIENT
Start: 2018-04-27 | End: 2018-04-29

## 2018-04-27 RX ORDER — POLYETHYLENE GLYCOL 3350 17 G/17G
17 POWDER, FOR SOLUTION ORAL DAILY
Status: DISCONTINUED | OUTPATIENT
Start: 2018-04-28 | End: 2018-05-02 | Stop reason: HOSPADM

## 2018-04-27 RX ORDER — POTASSIUM CHLORIDE 29.8 MG/ML
40 INJECTION INTRAVENOUS
Status: DISCONTINUED | OUTPATIENT
Start: 2018-04-27 | End: 2018-04-29

## 2018-04-27 RX ORDER — HYDROMORPHONE HYDROCHLORIDE 2 MG/1
2 TABLET ORAL
Status: DISCONTINUED | OUTPATIENT
Start: 2018-04-27 | End: 2018-04-28

## 2018-04-27 RX ORDER — FENTANYL CITRATE 50 UG/ML
50 INJECTION, SOLUTION INTRAMUSCULAR; INTRAVENOUS
Status: DISCONTINUED | OUTPATIENT
Start: 2018-04-27 | End: 2018-04-28

## 2018-04-27 RX ORDER — FUROSEMIDE 10 MG/ML
40 INJECTION INTRAMUSCULAR; INTRAVENOUS 2 TIMES DAILY
Status: DISCONTINUED | OUTPATIENT
Start: 2018-04-28 | End: 2018-04-29

## 2018-04-27 RX ORDER — MORPHINE SULFATE 10 MG/ML
5 INJECTION INTRAMUSCULAR; INTRAVENOUS; SUBCUTANEOUS
Status: DISCONTINUED | OUTPATIENT
Start: 2018-04-27 | End: 2018-04-27

## 2018-04-27 RX ORDER — MIDAZOLAM HYDROCHLORIDE 1 MG/ML
1 INJECTION INTRAMUSCULAR; INTRAVENOUS
Status: DISCONTINUED | OUTPATIENT
Start: 2018-04-27 | End: 2018-04-29

## 2018-04-27 RX ORDER — METOPROLOL TARTRATE 25 MG/1
25 TABLET, FILM COATED ORAL 2 TIMES DAILY
Status: DISCONTINUED | OUTPATIENT
Start: 2018-04-28 | End: 2018-04-28

## 2018-04-27 RX ORDER — PAPAVERINE HYDROCHLORIDE 30 MG/ML
INJECTION INTRAMUSCULAR; INTRAVENOUS
Status: DISCONTINUED | OUTPATIENT
Start: 2018-04-27 | End: 2018-04-27 | Stop reason: HOSPADM

## 2018-04-27 RX ORDER — CLOPIDOGREL BISULFATE 75 MG/1
75 TABLET ORAL DAILY
Status: DISCONTINUED | OUTPATIENT
Start: 2018-04-28 | End: 2018-05-02 | Stop reason: HOSPADM

## 2018-04-27 RX ORDER — CHLORHEXIDINE GLUCONATE ORAL RINSE 1.2 MG/ML
10 SOLUTION DENTAL 2 TIMES DAILY
Status: DISCONTINUED | OUTPATIENT
Start: 2018-04-27 | End: 2018-05-02 | Stop reason: HOSPADM

## 2018-04-27 RX ORDER — DOCUSATE SODIUM 100 MG/1
100 CAPSULE, LIQUID FILLED ORAL 2 TIMES DAILY
Status: DISCONTINUED | OUTPATIENT
Start: 2018-04-28 | End: 2018-05-02 | Stop reason: HOSPADM

## 2018-04-27 RX ORDER — NOREPINEPHRINE BITARTRATE/D5W 4MG/250ML
0.02 PLASTIC BAG, INJECTION (ML) INTRAVENOUS CONTINUOUS
Status: DISCONTINUED | OUTPATIENT
Start: 2018-04-27 | End: 2018-04-29

## 2018-04-27 RX ADMIN — LISINOPRIL 10 MG: 10 TABLET ORAL at 09:04

## 2018-04-27 RX ADMIN — METOPROLOL TARTRATE 100 MG: 50 TABLET ORAL at 09:04

## 2018-04-27 RX ADMIN — LEVOTHYROXINE SODIUM 50 MCG: 50 TABLET ORAL at 06:04

## 2018-04-27 RX ADMIN — Medication 0.06 MCG/KG/MIN: at 07:04

## 2018-04-27 RX ADMIN — FENTANYL CITRATE 50 MCG: 50 INJECTION, SOLUTION INTRAMUSCULAR; INTRAVENOUS at 08:04

## 2018-04-27 RX ADMIN — SODIUM CHLORIDE 75 ML/HR: 0.9 INJECTION, SOLUTION INTRAVENOUS at 06:04

## 2018-04-27 RX ADMIN — MIDAZOLAM HYDROCHLORIDE 2 MG: 1 INJECTION, SOLUTION INTRAMUSCULAR; INTRAVENOUS at 08:04

## 2018-04-27 RX ADMIN — DEXMEDETOMIDINE HYDROCHLORIDE 0.2 MCG/KG/HR: 4 INJECTION, SOLUTION INTRAVENOUS at 11:04

## 2018-04-27 RX ADMIN — DEXTROSE AND SODIUM CHLORIDE 50 ML/HR: 5; .2 INJECTION, SOLUTION INTRAVENOUS at 07:04

## 2018-04-27 RX ADMIN — PANTOPRAZOLE SODIUM 40 MG: 40 TABLET, DELAYED RELEASE ORAL at 09:04

## 2018-04-27 RX ADMIN — SODIUM CHLORIDE, SODIUM LACTATE, POTASSIUM CHLORIDE, AND CALCIUM CHLORIDE 1000 ML: .6; .31; .03; .02 INJECTION, SOLUTION INTRAVENOUS at 09:04

## 2018-04-27 RX ADMIN — INSULIN ASPART 2 UNITS: 100 INJECTION, SOLUTION INTRAVENOUS; SUBCUTANEOUS at 10:04

## 2018-04-27 RX ADMIN — SODIUM CHLORIDE 7 UNITS/HR: 9 INJECTION, SOLUTION INTRAVENOUS at 07:04

## 2018-04-27 RX ADMIN — ASPIRIN 81 MG CHEWABLE TABLET 81 MG: 81 TABLET CHEWABLE at 09:04

## 2018-04-27 RX ADMIN — MIDAZOLAM HYDROCHLORIDE 2 MG: 1 INJECTION, SOLUTION INTRAMUSCULAR; INTRAVENOUS at 09:04

## 2018-04-27 RX ADMIN — ISOSORBIDE MONONITRATE 60 MG: 60 TABLET, EXTENDED RELEASE ORAL at 09:04

## 2018-04-27 RX ADMIN — CHLORHEXIDINE GLUCONATE 10 ML: 1.2 RINSE ORAL at 08:04

## 2018-04-27 NOTE — SUBJECTIVE & OBJECTIVE
Past Medical History:   Diagnosis Date    Angina pectoris     Anxiety     Arthritis     neck, back,     Brain cyst     x2    Colon polyp     Coronary artery disease     s/p PTCA    Diabetes mellitus type 2 without retinopathy     Diabetes type 2, controlled 2/17/2016    Diverticulitis of intestine without hemorrhage 11/10/2015    Dry senile macular degeneration     Ex-smoker 11/10/2015    Fatty liver     Gastritis     Gout     Herpes genitalia     Hiatal hernia     History of shingles     Hx of colonic polyps     8/21/09    Hyperlipidemia     Hypertension     Hypothyroid     Meningioma     6/14 mri dr cuevas    Obesity (BMI 30.0-34.9) 12/2/2015    Osteopenia 4/15 klever 4/17    Pneumonia     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 hx of seizures    Stroke     Tobacco dependence     Type 2 diabetes mellitus with stage 3 chronic kidney disease, without long-term current use of insulin     Type 2 diabetes mellitus without complication        Past Surgical History:   Procedure Laterality Date    CARDIAC CATHETERIZATION      [facility] 2-MRI safe to 3T    CATARACT EXTRACTION W/  INTRAOCULAR LENS IMPLANT  OD w/YAG    CATARACT EXTRACTION W/  INTRAOCULAR LENS IMPLANT  OS w/YAG    CHOLECYSTECTOMY      2/2012    CORONARY ANGIOPLASTY      HYSTERECTOMY  1970s       Review of patient's allergies indicates:   Allergen Reactions    Codeine      Other reaction(s): Unknown    Diprivan [propofol]     Doxycycline      Other reaction(s): Unknown    Iodine and iodide containing products      Other reaction(s): Unknown    Ivp dye  [iodinated contrast- oral and iv dye]      Other reaction(s): Unknown    Latex, natural rubber Hives    Metronidazole hcl      Other reaction(s): Unknown  Other reaction(s): Unknown     Pantoprazole      Difficulty urinating      Procaine      Other reaction(s): Unknown  Other reaction(s): Unknown    Propofol analogues Other (See Comments)     Passes out     Rosuvastatin      Other reaction(s): liver enlargement  Other reaction(s): liver enlargement    Simvastatin      Other reaction(s): Unknown  Liver problems      Sulfasalazine Other (See Comments)       Family History     Problem Relation (Age of Onset)    Cancer Mother    Diabetes Maternal Grandfather    Heart disease Mother, Son    Liver disease Mother    Psoriasis Maternal Grandmother        Social History Main Topics    Smoking status: Former Smoker     Packs/day: 0.50     Years: 30.00     Quit date: 1/1/2002    Smokeless tobacco: Never Used    Alcohol use No    Drug use: No    Sexual activity: Not Currently         Review of Systems   Unable to perform ROS: Intubated     Objective:     Vital Signs (Most Recent):  Temp: 98.6 °F (37 °C) (04/27/18 1845)  Pulse: 71 (04/27/18 1845)  Resp: 14 (04/27/18 1845)  BP: (!) 194/81 (04/27/18 1204)  SpO2: 97 % (04/27/18 1845) Vital Signs (24h Range):  Temp:  [97 °F (36.1 °C)-98.6 °F (37 °C)] 98.6 °F (37 °C)  Pulse:  [54-71] 71  Resp:  [12-18] 14  SpO2:  [91 %-97 %] 97 %  BP: (135-194)/(60-81) 194/81     Weight: 83.8 kg (184 lb 11.9 oz)  Body mass index is 31.71 kg/m².      Intake/Output Summary (Last 24 hours) at 04/27/18 1849  Last data filed at 04/27/18 1832   Gross per 24 hour   Intake             6140 ml   Output              600 ml   Net             5540 ml       Physical Exam   Constitutional: Vital signs are normal. She appears ill. She is sedated and intubated.   HENT:   Head: Atraumatic.   Eyes: Conjunctivae are normal.   Neck: No JVD present. No tracheal deviation present.   Cardiovascular: Normal rate and regular rhythm.  Exam reveals distant heart sounds and friction rub.    Pulses:       Radial pulses are 2+ on the right side, and 2+ on the left side.        Dorsalis pedis pulses  are 2+ on the right side, and 2+ on the left side.   Pulmonary/Chest: She is intubated. She has decreased breath sounds. She has no wheezes. She has no rhonchi. She has no rales.   Vent controlled respiratory effort   Abdominal: Soft. She exhibits no distension. Bowel sounds are absent.   Musculoskeletal: She exhibits no edema.   Neurological: She is unresponsive.   Skin: Skin is warm and dry. Capillary refill takes less than 2 seconds.            Vents:  Vent Mode: SIMV (04/27/18 1835)  Set Rate: 12 bmp (04/27/18 1835)  Vt Set: 500 mL (04/27/18 1835)  Pressure Support: 10 cmH20 (04/27/18 1835)  PEEP/CPAP: 5 cmH20 (04/27/18 1835)  Peak Airway Pressure: 25 cmH2O (04/27/18 1835)  Total Ve: 6.2 mL (04/27/18 1835)    Lines/Drains/Airways     Central Venous Catheter Line                 Introducer 04/27/18 1835 right internal jugular less than 1 day         Pulmonary Artery Catheter Assessment  04/27/18 1421 less than 1 day          Drain                 Closed/Suction Drain 04/27/18 1718 Left Leg Bulb 19 Fr. less than 1 day         Urethral Catheter 04/27/18 1340 Temperature probe 16 Fr. less than 1 day         Y Chest Tube 1 and 2 04/27/18 1704 1 Left Pleural 24 Fr. 2 Mediastinal 24 Fr. less than 1 day         Y Chest Tube 3 and 4 04/27/18 1705 3 Right Pleural 24 Fr. less than 1 day          Airway                 Airway - Non-Surgical 04/27/18 1320 Endotracheal Tube less than 1 day          Peripheral Intravenous Line                 Peripheral IV - Single Lumen 04/26/18 0938 Left Wrist 1 day         Peripheral IV - Single Lumen 04/27/18 1325 Right Wrist less than 1 day                Significant Labs:    CBC/Anemia Profile:    Recent Labs  Lab 04/26/18  0935 04/27/18  0450 04/27/18  1329  04/27/18  1641 04/27/18  1654 04/27/18  1713   WBC 9.39 6.89  --   --   --   --   --    HGB 14.4 11.8* 12.4  --   --   --   --    HCT 42.2 35.9* 37.2  < > 25* 26* 23*    114* 113*  --   --   --   --    MCV 94 95  --   --   --    --   --    RDW 14.1 14.7*  --   --   --   --   --    < > = values in this interval not displayed.     Chemistries:    Recent Labs  Lab 04/26/18  0935 04/27/18  0450   * 141   K 3.8 3.1*    109   CO2 23 25   BUN 24* 20   CREATININE 1.5* 1.1   CALCIUM 9.5 8.5*   ALBUMIN  --  3.1*   PROT  --  5.8*   BILITOT  --  0.4   ALKPHOS  --  90   ALT  --  30   AST  --  33       All pertinent labs within the past 24 hours have been reviewed.  ABG    Recent Labs  Lab 04/27/18  1836   PH 7.456*   PO2 75*   PCO2 38.7   HCO3 27.3   BE 3       Significant Imaging:   I have reviewed all pertinent imaging results/findings within the past 24 hours.

## 2018-04-27 NOTE — HPI
82 year old female with extensive PMH including CAD with remote PCI along with 2012 [facility] eval with non obstructive dz; HTN; HLD; DM2; obesity; anxiety; CKD3; meningioma  Admitted on 4/26 for [facility] eval s/t intermittent CP with exertion and activity intolerance  LHC revealed multi vessel CAD with nl EF

## 2018-04-27 NOTE — ASSESSMENT & PLAN NOTE
-Patient underwent LHC today to evaluate symptoms.   -Severe multivessel CAD with Normal LVEF  -CVT has been consulted and plan to proceed with CABG today  -NPO  -Continue ASA.  -Is allergic to Statins   -Resume Imdur   -No BB at this time due to bradycardia  -Will follow along post CABG   -Needs K+ replaced

## 2018-04-27 NOTE — NURSING
Diabetic screening  Interesting lady. Stopped taking meds and checking blood sugar recently . Expained checking made her nervous if it wasn't normal even though she reports it usually was . Controls with diet. At home BS usually 105-129. BS today 249.   Has esophogeal cancer and was for surgery this week but HTN changed plans. Talked constantly while I visited.   Will follow

## 2018-04-27 NOTE — PROGRESS NOTES
[facility] -   Cardiology  Progress Note    Patient Name: Loida Holden  MRN: [id number]  Admission Date: 4/26/2018  Hospital Length of Stay: 1 days  Code Status: Full Code   Attending Physician: Robb Mukherjee MD   Primary Care Physician: YAKELIN Richardson MD  Expected Discharge Date:   Principal Problem:Unstable angina    Subjective:   Brief HPI:    83 yo female with PMH CAD s/p PCI remote, and repeat LHC in 2012 nonobstructive, HTN, HLD, NIDDM, and h/o CVA. Echo in 2016 normal EF and LVH. Patient followed by [facility]. Seen in clinic earlier this month with complaints of lower sternal chest tightness for the past few months while walking to mailbox, resolved after resting. Also complained of chest pain after eating and associated with  dyspnea. Unable to do yard work due to pain. Patient underwent LHC today with Dr. Mukherjee and noted to have Severe multivessel CAD with Normal LVEF. CVT consulted for CABG     Hospital Course:   4/27/18- Patient post angiogram on yesterday that showed mutivessel CAD. CVT consulted for CABG. Planning for CABG x 2-3 today. Hypokalemia on morning labs. Vitals stable          Review of Systems   Constitution: Negative for diaphoresis, weakness, malaise/fatigue, weight gain and weight loss.   HENT: Negative for congestion and nosebleeds.    Cardiovascular: Positive for chest pain and dyspnea on exertion. Negative for claudication, cyanosis, irregular heartbeat, leg swelling, near-syncope, orthopnea, palpitations, paroxysmal nocturnal dyspnea and syncope.   Respiratory: Positive for shortness of breath. Negative for cough, hemoptysis, sleep disturbances due to breathing, snoring, sputum production and wheezing.    Hematologic/Lymphatic: Negative for bleeding problem. Does not bruise/bleed easily.   Skin: Negative for rash.   Musculoskeletal: Negative for arthritis, back pain, falls, joint pain, muscle cramps and muscle weakness.   Gastrointestinal: Negative  for abdominal pain, constipation, diarrhea, heartburn, hematemesis, hematochezia, melena and nausea.   Genitourinary: Negative for dysuria, hematuria and nocturia.   Neurological: Negative for excessive daytime sleepiness, dizziness, headaches, light-headedness, loss of balance, numbness and vertigo.     Objective:     Vital Signs (Most Recent):  Temp: 98.1 °F (36.7 °C) (04/27/18 1204)  Pulse: (!) 54 (04/27/18 1204)  Resp: 18 (04/27/18 1204)  BP: (!) 194/81 (04/27/18 1204)  SpO2: (!) 94 % (04/27/18 1204) Vital Signs (24h Range):  Temp:  [97 °F (36.1 °C)-98.8 °F (37.1 °C)] 98.1 °F (36.7 °C)  Pulse:  [51-65] 54  Resp:  [14-18] 18  SpO2:  [93 %-99 %] 94 %  BP: (135-196)/(55-86) 194/81     Weight: 83.8 kg (184 lb 11.9 oz)  Body mass index is 31.71 kg/m².     SpO2: (!) 94 %  O2 Device (Oxygen Therapy): room air      Intake/Output Summary (Last 24 hours) at 04/27/18 1248  Last data filed at 04/27/18 0611   Gross per 24 hour   Intake          1663.33 ml   Output              350 ml   Net          1313.33 ml       Lines/Drains/Airways     Peripheral Intravenous Line                 Peripheral IV - Single Lumen 04/26/18 0938 Left Wrist 1 day                Physical Exam   Constitutional: She is oriented to person, place, and time. She appears well-developed and well-nourished.   Neck: Neck supple. No JVD present.   Cardiovascular: Normal rate, regular rhythm, normal heart sounds and normal pulses.  Exam reveals no friction rub.    No murmur heard.  Pulmonary/Chest: Effort normal and breath sounds normal. No respiratory distress. She has no wheezes. She has no rales.   Abdominal: Soft. Bowel sounds are normal. She exhibits no distension.   Musculoskeletal: She exhibits no edema or tenderness.   Neurological: She is alert and oriented to person, place, and time.   Skin: Skin is warm and dry. No rash noted.   Left groin access site without redness, tenderness, swelling, or drainage. There is no active external bleeding or  hematoma.    Psychiatric: She has a normal mood and affect. Her behavior is normal.   Nursing note and vitals reviewed.      Significant Labs:   All pertinent lab results from the last 24 hours have been reviewed. and   Recent Lab Results       04/27/18  1048 04/27/18  0900 04/27/18  0450 04/26/18  2150 04/26/18  1658      Unit Blood Type Code          Unit Expiration          Unit Blood Type          Albumin   3.1(L)       Alkaline Phosphatase   90       Allens Test     Pass     ALT   30       Anion Gap   7(L)       AST   33       Baso #   0.01       Basophil%   0.1       Total Bilirubin   0.4  Comment:  For infants and newborns, interpretation of results should be based  on gestational age, weight and in agreement with clinical  observations.  Premature Infant recommended reference ranges:  Up to 24 hours.............<8.0 mg/dL  Up to 48 hours............<12.0 mg/dL  3-5 days..................<15.0 mg/dL  6-29 days.................<15.0 mg/dL         Site     RR     BUN, Bld   20       Calcium   8.5(L)       Chloride   109       CO2   25       CODING SYSTEM          Creatinine   1.1       DelSys     Room Air     Differential Method   Automated       DISPENSE STATUS          EF  60        eGFR if    54(A)       eGFR if non    47  Comment:  Calculation used to obtain the estimated glomerular filtration  rate (eGFR) is the CKD-EPI equation.   (A)       Eos #   0.1       Eosinophil%   0.9       Glucose   249(H)       Gran # (ANC)   4.9       Gran%   70.7       Group & Rh          Hematocrit   35.9(L)       Hemoglobin   11.8(L)       INDIRECT ELENITA          Lymph #   1.4       Lymph%   20.0       MCH   31.2(H)       MCHC   32.9       MCV   95       Mode     SPONT     Mono #   0.6       Mono%   8.3       MPV   10.5       Mitral Valve Mobility  NORMAL        Mitral Valve Regurgitation  MILD        Est. PA Systolic Pressure  22.89        Platelets   114(L)       POC BE     0     POC HCO3      23.3(L)     POC PCO2     31.7(L)     POC PH     7.474(H)     POC PO2     76(L)     POC SATURATED O2     96     POCT Glucose 164(H)   270(H)      Potassium   3.1(L)       Prealbumin          PRODUCT CODE          Total Protein   5.8(L)       RBC   3.78(L)       RDW   14.7(H)       Sample     ARTERIAL     Sodium   141       Tricuspid Valve Regurgitation  TRIVIAL        UNIT NUMBER          WBC   6.89                   04/26/18  1654 04/26/18  1645 04/26/18  1513      Unit Blood Type Code   5100        0600[P]        6200[P]        6200[P]        5100[P]        5100[P]     Unit Expiration   [id number][P]        [id number][P]        [id number][P]        [id number][P]        [id number][P]     Unit Blood Type   O POS        A NEG[P]        A POS[P]        A POS[P]        O POS[P]        O POS[P]     Albumin        Alkaline Phosphatase        Allens Test        ALT        Anion Gap        AST        Baso #        Basophil%        Total Bilirubin        Site        BUN, Bld        Calcium        Chloride        CO2        CODING SYSTEM   [id number]        [id number][P]        LPIV344[P]        KHCS392[P]        YYYP589[P]        HFBR564[P]     Creatinine        DelSys        Differential Method        DISPENSE STATUS   RETURNED        CROSSMATCHED[P]        CROSSMATCHED[P]        CROSSMATCHED[P]        CROSSMATCHED[P]        CROSSMATCHED[P]     EF        eGFR if         eGFR if non         Eos #        Eosinophil%        Glucose        Gran # (ANC)        Gran%        Group & Rh   O POS     Hematocrit        Hemoglobin        INDIRECT ELENITA   NEG     Lymph #        Lymph%        MCH        MCHC        MCV        Mode        Mono #        Mono%        MPV        Mitral Valve Mobility        Mitral Valve Regurgitation        Est. PA Systolic Pressure        Platelets        POC BE        POC HCO3        POC PCO2        POC PH        POC PO2        POC SATURATED O2         POCT Glucose 315(H)       Potassium        Prealbumin  19(L)      PRODUCT CODE   [id number]        [id number][P]        T3022Z11[P]        [id number][P]        [id number][P]        [id number][P]     Total Protein        RBC        RDW        Sample        Sodium        Tricuspid Valve Regurgitation        UNIT NUMBER   [id number]        [id number][P]        [id number][P]        [id number][P]        [id number][P]        [id number][P]     WBC              Significant Imaging: X-Ray: CXR: X-Ray Chest 1 View (CXR): No results found for this visit on 04/26/18. and X-Ray Chest PA and Lateral (CXR): No results found for this visit on 04/26/18.    Assessment and Plan:       * Unstable angina    -Patient underwent LHC today to evaluate symptoms.   -Severe multivessel CAD with Normal LVEF  -CVT has been consulted and plan to proceed with CABG today  -NPO  -Continue ASA.  -Is allergic to Statins   -Resume Imdur   -No BB at this time due to bradycardia  -Will follow along post CABG   -Needs K+ replaced         CAD, multiple vessel    See plan for unstable angina             VTE Risk Mitigation         Ordered     enoxaparin injection 40 mg  Daily      04/26/18 1446     IP VTE HIGH RISK PATIENT  Once      04/26/18 1446        Chart reviewed. Patient examined by Dr. Michelle and agrees with plan that has been outlined.     VEE Velazquez  Cardiology  [facility]

## 2018-04-27 NOTE — HOSPITAL COURSE
"4/27 - transferred to ICU sedated post anesthesia with OETT on mechanical ventilation along with levophed support and insulin infusion  4/28 - extubated early this am, complains of SOB, uncontrolled incisional pain; levophed weaned, MAP marginal with analgesia  4/28 - remained on insulin infusion overnight; up to chair this morning and reports pain better controlled today, feeling "better"; lines/tubes removed by CVT this am  "

## 2018-04-27 NOTE — ASSESSMENT & PLAN NOTE
Insulin infusion with hourly monitoring and adjustments for glucose control and prevention of insulin toxicity

## 2018-04-27 NOTE — PLAN OF CARE
Problem: Patient Care Overview  Goal: Plan of Care Review  Outcome: Ongoing (interventions implemented as appropriate)  A/o x4. Plan of care reviewed with patient and son, who is at bedside. Pre CABG video shown. Cardiac cath puncture site C/D/I. VSS. NADN. IV fluids infusing. Call light within reach. Fall precautions in place. Will continue to monitor.

## 2018-04-27 NOTE — HOSPITAL COURSE
4/27/18- Patient post angiogram on yesterday that showed mutivessel CAD. CVT consulted for CABG. Planning for CABG x 2-3 today. Hypokalemia on morning labs. Vitals stable    4/28/18 - S/p CABG, doing well post op; with chest soreness, using IS; hemodynamically stable  4/29/18 - did well overnight, sitting in chair, eating tired but denied SOB, +chest soreness, will have chest tubes out and tx to tele    4/30/18-Patient seen and examined today, lying in bed. Complains of incisional pain and weakness. Labs stable. Needs to ambulate. No arrhythmias overnight.    5/1/18-Patients seen and examined today. Feels ok but complains of fatigue and weakness. No chest pain. Labs stable. CXR reviewed, needs to use IS. Lasix re-started.     5/2/18-Patient seen and examined today. Looks and feels much better. Ambulated earlier today without any issues. Remains chest pain free. Labs stable.

## 2018-04-28 PROBLEM — R09.02 HYPOXIA: Status: ACTIVE | Noted: 2018-04-28

## 2018-04-28 PROBLEM — Z99.11 ON MECHANICALLY ASSISTED VENTILATION: Status: RESOLVED | Noted: 2018-04-27 | Resolved: 2018-04-28

## 2018-04-28 LAB
ALLENS TEST: ABNORMAL
ANION GAP SERPL CALC-SCNC: 8 MMOL/L
BASOPHILS # BLD AUTO: 0.01 K/UL
BASOPHILS NFR BLD: 0.1 %
BNP SERPL-MCNC: 724 PG/ML
BUN SERPL-MCNC: 16 MG/DL
CALCIUM SERPL-MCNC: 7.9 MG/DL
CHLORIDE SERPL-SCNC: 111 MMOL/L
CO2 SERPL-SCNC: 24 MMOL/L
CREAT SERPL-MCNC: 0.9 MG/DL
DELSYS: ABNORMAL
DIFFERENTIAL METHOD: ABNORMAL
EOSINOPHIL # BLD AUTO: 0 K/UL
EOSINOPHIL NFR BLD: 0.1 %
ERYTHROCYTE [DISTWIDTH] IN BLOOD BY AUTOMATED COUNT: 17.8 %
EST. GFR  (AFRICAN AMERICAN): >60 ML/MIN/1.73 M^2
EST. GFR  (NON AFRICAN AMERICAN): 60 ML/MIN/1.73 M^2
FIO2: 40
GLUCOSE SERPL-MCNC: 99 MG/DL
HCO3 UR-SCNC: 23.7 MMOL/L (ref 24–28)
HCT VFR BLD AUTO: 30.4 %
HGB BLD-MCNC: 10.3 G/DL
LYMPHOCYTES # BLD AUTO: 1.4 K/UL
LYMPHOCYTES NFR BLD: 9.4 %
MAGNESIUM SERPL-MCNC: 2.4 MG/DL
MCH RBC QN AUTO: 30.1 PG
MCHC RBC AUTO-ENTMCNC: 33.9 G/DL
MCV RBC AUTO: 89 FL
MODE: ABNORMAL
MONOCYTES # BLD AUTO: 2.1 K/UL
MONOCYTES NFR BLD: 14 %
NEUTROPHILS # BLD AUTO: 11.3 K/UL
NEUTROPHILS NFR BLD: 77 %
PCO2 BLDA: 33.6 MMHG (ref 35–45)
PH SMN: 7.46 [PH] (ref 7.35–7.45)
PLATELET # BLD AUTO: 181 K/UL
PMV BLD AUTO: 9.7 FL
PO2 BLDA: 65 MMHG (ref 80–100)
POC BE: 0 MMOL/L
POC SATURATED O2: 94 % (ref 95–100)
POCT GLUCOSE: 102 MG/DL (ref 70–110)
POCT GLUCOSE: 105 MG/DL (ref 70–110)
POCT GLUCOSE: 108 MG/DL (ref 70–110)
POCT GLUCOSE: 112 MG/DL (ref 70–110)
POCT GLUCOSE: 113 MG/DL (ref 70–110)
POCT GLUCOSE: 115 MG/DL (ref 70–110)
POCT GLUCOSE: 115 MG/DL (ref 70–110)
POCT GLUCOSE: 116 MG/DL (ref 70–110)
POCT GLUCOSE: 116 MG/DL (ref 70–110)
POCT GLUCOSE: 120 MG/DL (ref 70–110)
POCT GLUCOSE: 130 MG/DL (ref 70–110)
POCT GLUCOSE: 131 MG/DL (ref 70–110)
POCT GLUCOSE: 148 MG/DL (ref 70–110)
POCT GLUCOSE: 149 MG/DL (ref 70–110)
POCT GLUCOSE: 168 MG/DL (ref 70–110)
POCT GLUCOSE: 168 MG/DL (ref 70–110)
POCT GLUCOSE: 169 MG/DL (ref 70–110)
POCT GLUCOSE: 183 MG/DL (ref 70–110)
POCT GLUCOSE: 186 MG/DL (ref 70–110)
POCT GLUCOSE: 196 MG/DL (ref 70–110)
POCT GLUCOSE: 197 MG/DL (ref 70–110)
POCT GLUCOSE: 209 MG/DL (ref 70–110)
POCT GLUCOSE: 89 MG/DL (ref 70–110)
POCT GLUCOSE: 90 MG/DL (ref 70–110)
POTASSIUM SERPL-SCNC: 3.3 MMOL/L
PS: 10
RBC # BLD AUTO: 3.42 M/UL
SAMPLE: ABNORMAL
SITE: ABNORMAL
SODIUM SERPL-SCNC: 143 MMOL/L
WBC # BLD AUTO: 14.84 K/UL

## 2018-04-28 PROCEDURE — P9045 ALBUMIN (HUMAN), 5%, 250 ML: HCPCS | Mod: JG | Performed by: PHYSICIAN ASSISTANT

## 2018-04-28 PROCEDURE — 63600175 PHARM REV CODE 636 W HCPCS: Performed by: FAMILY MEDICINE

## 2018-04-28 PROCEDURE — 27000221 HC OXYGEN, UP TO 24 HOURS

## 2018-04-28 PROCEDURE — 80048 BASIC METABOLIC PNL TOTAL CA: CPT

## 2018-04-28 PROCEDURE — 37799 UNLISTED PX VASCULAR SURGERY: CPT

## 2018-04-28 PROCEDURE — 83735 ASSAY OF MAGNESIUM: CPT

## 2018-04-28 PROCEDURE — G8978 MOBILITY CURRENT STATUS: HCPCS | Mod: CL

## 2018-04-28 PROCEDURE — 63600175 PHARM REV CODE 636 W HCPCS: Performed by: INTERNAL MEDICINE

## 2018-04-28 PROCEDURE — 99291 CRITICAL CARE FIRST HOUR: CPT | Mod: ,,, | Performed by: NURSE PRACTITIONER

## 2018-04-28 PROCEDURE — 25000003 PHARM REV CODE 250

## 2018-04-28 PROCEDURE — S0028 INJECTION, FAMOTIDINE, 20 MG: HCPCS | Performed by: PHYSICIAN ASSISTANT

## 2018-04-28 PROCEDURE — G8979 MOBILITY GOAL STATUS: HCPCS | Mod: CK

## 2018-04-28 PROCEDURE — 36415 COLL VENOUS BLD VENIPUNCTURE: CPT

## 2018-04-28 PROCEDURE — 20000000 HC ICU ROOM

## 2018-04-28 PROCEDURE — 82803 BLOOD GASES ANY COMBINATION: CPT

## 2018-04-28 PROCEDURE — 99900035 HC TECH TIME PER 15 MIN (STAT)

## 2018-04-28 PROCEDURE — 97530 THERAPEUTIC ACTIVITIES: CPT

## 2018-04-28 PROCEDURE — P9047 ALBUMIN (HUMAN), 25%, 50ML: HCPCS | Mod: JG

## 2018-04-28 PROCEDURE — 25000003 PHARM REV CODE 250: Performed by: THORACIC SURGERY (CARDIOTHORACIC VASCULAR SURGERY)

## 2018-04-28 PROCEDURE — 97163 PT EVAL HIGH COMPLEX 45 MIN: CPT

## 2018-04-28 PROCEDURE — 85025 COMPLETE CBC W/AUTO DIFF WBC: CPT

## 2018-04-28 PROCEDURE — 63600175 PHARM REV CODE 636 W HCPCS

## 2018-04-28 PROCEDURE — 63600175 PHARM REV CODE 636 W HCPCS: Performed by: PHYSICIAN ASSISTANT

## 2018-04-28 PROCEDURE — 99900017 HC EXTUBATION W/PARAMETERS (STAT)

## 2018-04-28 PROCEDURE — 63600175 PHARM REV CODE 636 W HCPCS: Performed by: THORACIC SURGERY (CARDIOTHORACIC VASCULAR SURGERY)

## 2018-04-28 PROCEDURE — 25000003 PHARM REV CODE 250: Performed by: PHYSICIAN ASSISTANT

## 2018-04-28 PROCEDURE — 99291 CRITICAL CARE FIRST HOUR: CPT | Mod: ,,, | Performed by: INTERNAL MEDICINE

## 2018-04-28 PROCEDURE — 94799 UNLISTED PULMONARY SVC/PX: CPT

## 2018-04-28 PROCEDURE — 83880 ASSAY OF NATRIURETIC PEPTIDE: CPT

## 2018-04-28 RX ORDER — ENOXAPARIN SODIUM 100 MG/ML
40 INJECTION SUBCUTANEOUS EVERY 24 HOURS
Status: DISCONTINUED | OUTPATIENT
Start: 2018-04-28 | End: 2018-04-28

## 2018-04-28 RX ORDER — POTASSIUM CHLORIDE 20 MEQ/1
40 TABLET, EXTENDED RELEASE ORAL ONCE
Status: DISCONTINUED | OUTPATIENT
Start: 2018-04-28 | End: 2018-04-28

## 2018-04-28 RX ORDER — ACETAMINOPHEN 325 MG/1
650 TABLET ORAL EVERY 6 HOURS
Status: DISCONTINUED | OUTPATIENT
Start: 2018-04-28 | End: 2018-05-02 | Stop reason: HOSPADM

## 2018-04-28 RX ORDER — HYDROMORPHONE HYDROCHLORIDE 1 MG/ML
0.5 INJECTION, SOLUTION INTRAMUSCULAR; INTRAVENOUS; SUBCUTANEOUS
Status: DISCONTINUED | OUTPATIENT
Start: 2018-04-28 | End: 2018-05-02 | Stop reason: HOSPADM

## 2018-04-28 RX ORDER — ENOXAPARIN SODIUM 100 MG/ML
40 INJECTION SUBCUTANEOUS EVERY 24 HOURS
Status: DISCONTINUED | OUTPATIENT
Start: 2018-04-28 | End: 2018-05-02 | Stop reason: HOSPADM

## 2018-04-28 RX ORDER — OXYCODONE HYDROCHLORIDE 5 MG/1
5 TABLET ORAL EVERY 4 HOURS PRN
Status: DISCONTINUED | OUTPATIENT
Start: 2018-04-28 | End: 2018-05-02 | Stop reason: HOSPADM

## 2018-04-28 RX ADMIN — ALBUMIN HUMAN 250 ML: 0.05 INJECTION, SOLUTION INTRAVENOUS at 02:04

## 2018-04-28 RX ADMIN — DOCUSATE SODIUM 100 MG: 100 CAPSULE, LIQUID FILLED ORAL at 08:04

## 2018-04-28 RX ADMIN — POTASSIUM CHLORIDE 40 MEQ: 1500 TABLET, EXTENDED RELEASE ORAL at 09:04

## 2018-04-28 RX ADMIN — Medication 0.5 MG: at 04:04

## 2018-04-28 RX ADMIN — SODIUM CHLORIDE 3 UNITS/HR: 9 INJECTION, SOLUTION INTRAVENOUS at 10:04

## 2018-04-28 RX ADMIN — MAGNESIUM SULFATE IN WATER 2 G: 40 INJECTION, SOLUTION INTRAVENOUS at 02:04

## 2018-04-28 RX ADMIN — FUROSEMIDE 40 MG: 10 INJECTION, SOLUTION INTRAMUSCULAR; INTRAVENOUS at 08:04

## 2018-04-28 RX ADMIN — POTASSIUM CHLORIDE 40 MEQ: 29.8 INJECTION, SOLUTION INTRAVENOUS at 05:04

## 2018-04-28 RX ADMIN — CEFAZOLIN 2 G: 330 INJECTION, POWDER, FOR SOLUTION INTRAMUSCULAR; INTRAVENOUS at 01:04

## 2018-04-28 RX ADMIN — OXYCODONE HYDROCHLORIDE 5 MG: 5 TABLET ORAL at 01:04

## 2018-04-28 RX ADMIN — FUROSEMIDE 40 MG: 10 INJECTION, SOLUTION INTRAMUSCULAR; INTRAVENOUS at 09:04

## 2018-04-28 RX ADMIN — Medication 0.5 MG: at 07:04

## 2018-04-28 RX ADMIN — ACETAMINOPHEN 650 MG: 325 TABLET, FILM COATED ORAL at 05:04

## 2018-04-28 RX ADMIN — ASPIRIN 81 MG: 81 TABLET, COATED ORAL at 08:04

## 2018-04-28 RX ADMIN — CHLORHEXIDINE GLUCONATE 10 ML: 1.2 RINSE ORAL at 08:04

## 2018-04-28 RX ADMIN — ONDANSETRON 4 MG: 2 INJECTION INTRAMUSCULAR; INTRAVENOUS at 03:04

## 2018-04-28 RX ADMIN — Medication 0.5 MG: at 10:04

## 2018-04-28 RX ADMIN — DOCUSATE SODIUM 100 MG: 100 CAPSULE, LIQUID FILLED ORAL at 09:04

## 2018-04-28 RX ADMIN — SODIUM CHLORIDE 1500 MG: 900 INJECTION, SOLUTION INTRAVENOUS at 01:04

## 2018-04-28 RX ADMIN — POTASSIUM CHLORIDE 40 MEQ: 1500 TABLET, EXTENDED RELEASE ORAL at 08:04

## 2018-04-28 RX ADMIN — OXYCODONE HYDROCHLORIDE 5 MG: 5 TABLET ORAL at 08:04

## 2018-04-28 RX ADMIN — FENTANYL CITRATE 50 MCG: 50 INJECTION, SOLUTION INTRAMUSCULAR; INTRAVENOUS at 05:04

## 2018-04-28 RX ADMIN — CLOPIDOGREL BISULFATE 75 MG: 75 TABLET ORAL at 08:04

## 2018-04-28 RX ADMIN — ACETAMINOPHEN 650 MG: 325 TABLET, FILM COATED ORAL at 11:04

## 2018-04-28 RX ADMIN — ENOXAPARIN SODIUM 40 MG: 100 INJECTION SUBCUTANEOUS at 07:04

## 2018-04-28 RX ADMIN — FAMOTIDINE 20 MG: 10 INJECTION INTRAVENOUS at 08:04

## 2018-04-28 RX ADMIN — CEFAZOLIN 2 G: 330 INJECTION, POWDER, FOR SOLUTION INTRAMUSCULAR; INTRAVENOUS at 10:04

## 2018-04-28 RX ADMIN — OXYCODONE HYDROCHLORIDE 5 MG: 5 TABLET ORAL at 05:04

## 2018-04-28 RX ADMIN — POLYETHYLENE GLYCOL 3350 17 G: 17 POWDER, FOR SOLUTION ORAL at 08:04

## 2018-04-28 NOTE — SUBJECTIVE & OBJECTIVE
Past Medical History:   Diagnosis Date    Angina pectoris     Anxiety     Arthritis     neck, back,     Brain cyst     x2    Colon polyp     Coronary artery disease     s/p PTCA    Diabetes mellitus type 2 without retinopathy     Diabetes type 2, controlled 2/17/2016    Diverticulitis of intestine without hemorrhage 11/10/2015    Dry senile macular degeneration     Ex-smoker 11/10/2015    Fatty liver     Gastritis     Gout     Herpes genitalia     Hiatal hernia     History of shingles     Hx of colonic polyps     8/21/09    Hyperlipidemia     Hypertension     Hypothyroid     Meningioma     6/14 mri dr cuevas    Obesity (BMI 30.0-34.9) 12/2/2015    Osteopenia 4/15 klever 4/17    Pneumonia     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 hx of seizures    Stroke     Tobacco dependence     Type 2 diabetes mellitus with stage 3 chronic kidney disease, without long-term current use of insulin     Type 2 diabetes mellitus without complication        Past Surgical History:   Procedure Laterality Date    CARDIAC CATHETERIZATION      [facility] 2-MRI safe to 3T    CATARACT EXTRACTION W/  INTRAOCULAR LENS IMPLANT  OD w/YAG    CATARACT EXTRACTION W/  INTRAOCULAR LENS IMPLANT  OS w/YAG    CHOLECYSTECTOMY      2/2012    CORONARY ANGIOPLASTY      HYSTERECTOMY  1970s       Review of patient's allergies indicates:   Allergen Reactions    Codeine      Other reaction(s): Unknown    Diprivan [propofol]     Doxycycline      Other reaction(s): Unknown    Iodine and iodide containing products      Other reaction(s): Unknown    Ivp dye  [iodinated contrast- oral and iv dye]      Other reaction(s): Unknown    Latex, natural rubber Hives    Metronidazole hcl      Other reaction(s): Unknown  Other reaction(s): Unknown     Pantoprazole      Difficulty urinating      Procaine      Other reaction(s): Unknown  Other reaction(s): Unknown    Propofol analogues Other (See Comments)     Passes out     Rosuvastatin      Other reaction(s): liver enlargement  Other reaction(s): liver enlargement    Simvastatin      Other reaction(s): Unknown  Liver problems      Sulfasalazine Other (See Comments)       No current facility-administered medications on file prior to encounter.      Current Outpatient Prescriptions on File Prior to Encounter   Medication Sig    allopurinol (ZYLOPRIM) 100 MG tablet TAKE ONE TABLET BY MOUTH ONCE DAILY    betamethasone valerate 0.1% (VALISONE) 0.1 % Crea APPLY TOPICALLY TWO TIMES DAILY    BIOTIN ORAL Take 5,000 mcg by mouth once daily.    calcium carbonate (TUMS) 200 mg calcium (500 mg) chewable tablet Take 1 tablet by mouth once daily.    clopidogrel (PLAVIX) 75 mg tablet Take 1 tablet (75 mg total) by mouth once daily.    furosemide (LASIX) 40 MG tablet Take 1 tablet (40 mg total) by mouth 2 (two) times daily.    isosorbide mononitrate (IMDUR) 60 MG 24 hr tablet Take 1 tablet (60 mg total) by mouth once daily.    levothyroxine (SYNTHROID) 50 MCG tablet TAKE ONE TABLET BY MOUTH ONCE DAILY BEFORE BREAKFAST    lisinopril 10 MG tablet Take 10 mg by mouth once daily.     MAG/ALUMINUM/SOD BICARB/ALGINC (GAVISCON ORAL) Take by mouth.    metoprolol tartrate (LOPRESSOR) 50 MG tablet Take 2 tablets (100 mg total) by mouth 2 (two) times daily.    pantoprazole (PROTONIX) 40 MG tablet     potassium chloride SA (K-DUR,KLOR-CON) 20 MEQ tablet Take 1 tablet (20 mEq total) by mouth 2 (two) times daily.    walker Misc Use rollator walker to reduce stress on knee    ACCU-CHEK NANNETTE PLUS METER [facility]     ACCU-CHEK SOFTCLIX LANCETS Misc     amoxicillin (AMOXIL) 875 MG tablet Take 875 mg by mouth 2 (two) times daily.    blood sugar diagnostic (TRUETEST TEST STRIPS) Strp 1 strip by Misc.(Non-Drug; Combo Route) route 3  (three) times daily. True test strips    lancets 33 gauge Misc 1 lancet by Misc.(Non-Drug; Combo Route) route 3 (three) times daily.     Family History     Problem Relation (Age of Onset)    Cancer Mother    Diabetes Maternal Grandfather    Heart disease Mother, Son    Liver disease Mother    Psoriasis Maternal Grandmother        Social History Main Topics    Smoking status: Former Smoker     Packs/day: 0.50     Years: 30.00     Quit date: 1/1/2002    Smokeless tobacco: Never Used    Alcohol use No    Drug use: No    Sexual activity: Not Currently     Review of Systems   Unable to perform ROS: Intubated     Objective:     Vital Signs (Most Recent):  Temp: 98.6 °F (37 °C) (04/27/18 1845)  Pulse: 69 (04/27/18 1928)  Resp: 14 (04/27/18 1845)  BP: (!) 194/81 (04/27/18 1204)  SpO2: 99 % (04/27/18 1928) Vital Signs (24h Range):  Temp:  [97.7 °F (36.5 °C)-98.6 °F (37 °C)] 98.6 °F (37 °C)  Pulse:  [54-71] 69  Resp:  [12-18] 14  SpO2:  [91 %-99 %] 99 %  BP: (135-194)/(60-81) 194/81     Weight: 83.8 kg (184 lb 11.9 oz)  Body mass index is 31.71 kg/m².    Physical Exam   Constitutional: She appears well-developed and well-nourished. No distress.   On mechanical ventilation and sedation: GCS 3T  ROBERTO CARLOS drain - LLE, serosainguinous fluid  L chest tube - serosainguinous fluid  Midsternal incision - dressing c/d/i   HENT:   Head: Normocephalic and atraumatic.   Eyes: Conjunctivae and EOM are normal. Pupils are equal, round, and reactive to light. No scleral icterus.   Neck: Normal range of motion. Neck supple. No JVD present. No thyromegaly present.   Cardiovascular: Normal rate, regular rhythm and intact distal pulses.  Exam reveals no gallop and no friction rub.    No murmur heard.  Pulmonary/Chest: Effort normal. No respiratory distress. She has no wheezes. She has no rales. She exhibits no tenderness.   Coarse breath sounds bilaterally   Abdominal: Soft. Bowel sounds are normal. She exhibits no distension and no mass. There  is no tenderness. There is no guarding.   Musculoskeletal: Normal range of motion. She exhibits no tenderness.   Neurological: No cranial nerve deficit.   Skin: Skin is warm and dry. Capillary refill takes 2 to 3 seconds. No rash noted. She is not diaphoretic. No erythema.   Nursing note and vitals reviewed.      Significant Labs:   A1C:   Recent Labs  Lab 04/26/18  0935   HGBA1C 9.6*     BMP:   Recent Labs  Lab 04/27/18  1903   *      K 3.6   *   CO2 24   BUN 16   CREATININE 0.9   CALCIUM 7.7*   MG 1.6     CBC:   Recent Labs  Lab 04/26/18  0935 04/27/18  0450 04/27/18  1329  04/27/18  1654 04/27/18  1713 04/27/18  1839   WBC 9.39 6.89  --   --   --   --  12.55   HGB 14.4 11.8* 12.4  --   --   --  8.4*   HCT 42.2 35.9* 37.2  < > 26* 23* 24.6*    114* 113*  --   --   --  161   < > = values in this interval not displayed.  CMP:   Recent Labs  Lab 04/26/18  0935 04/27/18  0450 04/27/18  1903   * 141 143   K 3.8 3.1* 3.6    109 112*   CO2 23 25 24   * 249* 193*   BUN 24* 20 16   CREATININE 1.5* 1.1 0.9   CALCIUM 9.5 8.5* 7.7*   PROT  --  5.8*  --    ALBUMIN  --  3.1*  --    BILITOT  --  0.4  --    ALKPHOS  --  90  --    AST  --  33  --    ALT  --  30  --    ANIONGAP 12 7* 7*   EGFRNONAA 32* 47* 60     Magnesium:   Recent Labs  Lab 04/27/18  1903   MG 1.6     TSH: No results for input(s): TSH in the last 4320 hours.  Urine Studies: No results for input(s): COLORU, APPEARANCEUA, PHUR, SPECGRAV, PROTEINUA, GLUCUA, KETONESU, BILIRUBINUA, OCCULTUA, NITRITE, UROBILINOGEN, LEUKOCYTESUR, RBCUA, WBCUA, BACTERIA, SQUAMEPITHEL, HYALINECASTS in the last 48 hours.    Invalid input(s): MANJULA  All pertinent labs within the past 24 hours have been reviewed.    Significant Imaging: I have reviewed all pertinent imaging results/findings within the past 24 hours.   Imaging Results    None

## 2018-04-28 NOTE — ASSESSMENT & PLAN NOTE
Settings reviewed and adjusted for optimal gas exchange  Keep sat > 92  Wean to liberate per protocol once fully awake  VAP prophylaxis

## 2018-04-28 NOTE — CONSULTS
[facility] -   Cardiology  Consult Note    Patient Name: Loida Holden  MRN: [id number]  Admission Date: 4/26/2018  Hospital Length of Stay: 2 days  Code Status: Full Code   Attending Provider: Robb Mukherjee MD   Consulting Provider: Karime Esteves Md, MD  Primary Care Physician: YAKELIN Richardson MD  Principal Problem:CAD, multiple vessel    Patient information was obtained from patient, past medical records and ER records.     Consult to Cardiology  Consult performed by: KARIME ESTEVES  Consult ordered by: CHELE SUGGS  Reason for consult: CAD s/p CABG        Subjective:     Chief Complaint:  Chest pain      HPI:   83 yo female with PMH CAD s/p PCI remote, and repeat LHC in 2012 nonobstructive, HTN, HLD, NIDDM, and h/o CVA. Echo in 2016 normal EF and LVH. Patient followed by [facility]. Seen in clinic earlier this month with complaints of lower sternal chest tightness for the past few months while walking to mailbox, resolved after resting. Also complained of chest pain after eating and associated with  dyspnea. Unable to do yard work due to pain. Patient underwent LHC today with Dr. Mukherjee and noted to have Severe multivessel CAD with Normal LVEF. CVT consulted for CABG     Past Medical History:   Diagnosis Date    Angina pectoris     Anxiety     Arthritis     neck, back,     Brain cyst     x2    Colon polyp     Coronary artery disease     s/p PTCA    Diabetes mellitus type 2 without retinopathy     Diabetes type 2, controlled 2/17/2016    Diverticulitis of intestine without hemorrhage 11/10/2015    Dry senile macular degeneration     Ex-smoker 11/10/2015    Fatty liver     Gastritis     Gout     Herpes genitalia     Hiatal hernia     History of shingles     Hx of colonic polyps     8/21/09    Hyperlipidemia     Hypertension     Hypothyroid     Meningioma     6/14 mri dr cuevas    Obesity (BMI 30.0-34.9) 12/2/2015    Osteopenia 4/15 klever 4/17    Pneumonia      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 hx of seizures    Stroke     Tobacco dependence     Type 2 diabetes mellitus with stage 3 chronic kidney disease, without long-term current use of insulin     Type 2 diabetes mellitus without complication        Past Surgical History:   Procedure Laterality Date    CARDIAC CATHETERIZATION      [facility] 2-MRI safe to 3T    CATARACT EXTRACTION W/  INTRAOCULAR LENS IMPLANT  OD w/YAG    CATARACT EXTRACTION W/  INTRAOCULAR LENS IMPLANT  OS w/YAG    CHOLECYSTECTOMY      2/2012    CORONARY ANGIOPLASTY      HYSTERECTOMY  1970s       Review of patient's allergies indicates:   Allergen Reactions    Codeine      Other reaction(s): Unknown    Diprivan [propofol]     Doxycycline      Other reaction(s): Unknown    Iodine and iodide containing products      Other reaction(s): Unknown    Ivp dye  [iodinated contrast- oral and iv dye]      Other reaction(s): Unknown    Latex, natural rubber Hives    Metronidazole hcl      Other reaction(s): Unknown  Other reaction(s): Unknown    Pantoprazole      Difficulty urinating      Procaine      Other reaction(s): Unknown  Other reaction(s): Unknown    Propofol analogues Other (See Comments)     Passes out     Rosuvastatin      Other reaction(s): liver enlargement  Other reaction(s): liver enlargement    Simvastatin      Other reaction(s): Unknown  Liver problems      Sulfasalazine Other (See Comments)       No current facility-administered medications on file prior to encounter.      Current Outpatient Prescriptions on File Prior to Encounter   Medication Sig    allopurinol (ZYLOPRIM) 100 MG tablet TAKE ONE TABLET BY MOUTH ONCE DAILY    betamethasone valerate 0.1% (VALISONE) 0.1 % Crea APPLY TOPICALLY TWO TIMES DAILY    BIOTIN ORAL Take 5,000  mcg by mouth once daily.    calcium carbonate (TUMS) 200 mg calcium (500 mg) chewable tablet Take 1 tablet by mouth once daily.    clopidogrel (PLAVIX) 75 mg tablet Take 1 tablet (75 mg total) by mouth once daily.    furosemide (LASIX) 40 MG tablet Take 1 tablet (40 mg total) by mouth 2 (two) times daily.    isosorbide mononitrate (IMDUR) 60 MG 24 hr tablet Take 1 tablet (60 mg total) by mouth once daily.    levothyroxine (SYNTHROID) 50 MCG tablet TAKE ONE TABLET BY MOUTH ONCE DAILY BEFORE BREAKFAST    lisinopril 10 MG tablet Take 10 mg by mouth once daily.     MAG/ALUMINUM/SOD BICARB/ALGINC (GAVISCON ORAL) Take by mouth.    metoprolol tartrate (LOPRESSOR) 50 MG tablet Take 2 tablets (100 mg total) by mouth 2 (two) times daily.    pantoprazole (PROTONIX) 40 MG tablet     potassium chloride SA (K-DUR,KLOR-CON) 20 MEQ tablet Take 1 tablet (20 mEq total) by mouth 2 (two) times daily.    walker Misc Use rollator walker to reduce stress on knee    ACCU-CHEK NANNETTE PLUS METER [facility]     ACCU-CHEK SOFTCLIX LANCETS Misc     amoxicillin (AMOXIL) 875 MG tablet Take 875 mg by mouth 2 (two) times daily.    blood sugar diagnostic (TRUETEST TEST STRIPS) Strp 1 strip by Misc.(Non-Drug; Combo Route) route 3 (three) times daily. True test strips    lancets 33 gauge Misc 1 lancet by Misc.(Non-Drug; Combo Route) route 3 (three) times daily.     Family History     Problem Relation (Age of Onset)    Cancer Mother    Diabetes Maternal Grandfather    Heart disease Mother, Son    Liver disease Mother    Psoriasis Maternal Grandmother        Social History Main Topics    Smoking status: Former Smoker     Packs/day: 0.50     Years: 30.00     Quit date: 1/1/2002    Smokeless tobacco: Never Used    Alcohol use No    Drug use: No    Sexual activity: Not Currently     Review of Systems   Constitution: Negative.   HENT: Negative.    Eyes: Negative.    Cardiovascular: Positive for chest pain.   Respiratory: Negative.     Endocrine: Negative.    Hematologic/Lymphatic: Negative.    Skin: Negative.    Musculoskeletal: Negative.    Gastrointestinal: Negative.    Genitourinary: Negative.    Neurological: Negative.    Psychiatric/Behavioral: Negative.    Allergic/Immunologic: Negative.      Objective:     Vital Signs (Most Recent):  Temp: 99.1 °F (37.3 °C) (04/28/18 1115)  Pulse: 73 (04/28/18 1100)  Resp: 18 (04/28/18 1100)  BP: (!) 87/26 (04/28/18 1100)  SpO2: (!) 93 % (04/28/18 1100) Vital Signs (24h Range):  Temp:  [98.1 °F (36.7 °C)-99.2 °F (37.3 °C)] 99.1 °F (37.3 °C)  Pulse:  [62-75] 73  Resp:  [12-29] 18  SpO2:  [91 %-100 %] 93 %  BP: ()/(26-99) 87/26     Weight: 92.4 kg (203 lb 11.3 oz)  Body mass index is 34.97 kg/m².    SpO2: (!) 93 %  O2 Device (Oxygen Therapy): nasal cannula      Intake/Output Summary (Last 24 hours) at 04/28/18 1217  Last data filed at 04/28/18 1115   Gross per 24 hour   Intake          7707.63 ml   Output             2383 ml   Net          5324.63 ml       Lines/Drains/Airways     Central Venous Catheter Line                 Introducer 04/27/18 1835 right internal jugular less than 1 day          Drain                 Closed/Suction Drain 04/27/18 1718 Left Leg Bulb 19 Fr. less than 1 day         Urethral Catheter 04/27/18 1340 Temperature probe 16 Fr. less than 1 day         Y Chest Tube 1 and 2 04/27/18 1704 1 Left Pleural 24 Fr. 2 Mediastinal 24 Fr. less than 1 day         Y Chest Tube 3 and 4 04/27/18 1705 3 Right Pleural 24 Fr. less than 1 day          Arterial Line                 Arterial Line 04/27/18  Left Radial 1 day          Line                 Pacer Wires 04/27/18 1724 less than 1 day          Peripheral Intravenous Line                 Peripheral IV - Single Lumen 04/26/18 0938 Left Wrist 2 days         Peripheral IV - Single Lumen 04/27/18 1325 Right Wrist less than 1 day                Physical Exam   Constitutional: She is oriented to person, place, and time. She appears  well-developed and well-nourished. No distress.   HENT:   Head: Normocephalic and atraumatic.   Nose: Nose normal.   Mouth/Throat: Oropharynx is clear and moist.   Eyes: Conjunctivae and EOM are normal. No scleral icterus.   Neck: Normal range of motion. Neck supple. No JVD present. No thyromegaly present.   Cardiovascular: Normal rate, regular rhythm, S1 normal and S2 normal.  Exam reveals friction rub. Exam reveals no gallop, no S3 and no S4.    Murmur heard.  Pulmonary/Chest: Effort normal and breath sounds normal. No stridor. No respiratory distress. She has no wheezes. She has no rales (coarse bs). She exhibits tenderness.   Chest tubes in place; medial sternotomy dressing c/d/i   Abdominal: Soft. Bowel sounds are normal. She exhibits no distension and no mass. There is no tenderness. There is no rebound.   Genitourinary:   Genitourinary Comments: Deferred   Musculoskeletal: Normal range of motion. She exhibits no edema, tenderness or deformity.   Lymphadenopathy:     She has no cervical adenopathy.   Neurological: She is alert and oriented to person, place, and time. She exhibits normal muscle tone. Coordination normal.   Skin: Skin is warm and dry. No rash noted. She is not diaphoretic. No erythema. No pallor.   Psychiatric: She has a normal mood and affect. Her behavior is normal. Judgment and thought content normal.   Nursing note and vitals reviewed.      Significant Labs:   All pertinent lab results from the last 24 hours have been reviewed. and   Recent Lab Results  (Last 25 results in the past 24 hours)      04/28/18  1114 04/28/18  0956 04/28/18  0859 04/28/18  0720 04/28/18  0603      Allens Test          Anion Gap          aPTT          Baso #          Basophil%          BNP          Site          BUN, Bld          Calcium          Chloride          CO2          Creatinine          DelSys          Differential Method          eGFR if           eGFR if non           Eos  #          Eosinophil%          Fibrinogen          FiO2          Glucose          Gran # (ANC)          Gran%          Hematocrit          Hemoglobin          Coumadin Monitoring INR          Lymph #          Lymph%          Magnesium          MCH          MCHC          MCV          Mode          Mono #          Mono%          MPV          PEEP          Phosphorus          Platelets          POC ACTIVATED CLOTTING TIME K          POC BE          POC Glucose          POC HCO3          POC Hematocrit          POC Ionized Calcium          POC PCO2          POC PH          POC PO2          POC Potassium          POC SATURATED O2          POC Sodium          POCT Glucose 196(H) 209(H) 197(H) 168(H) 168(H)     Potassium          Protime          PS          Rate          RBC          RDW          Sample          Sodium          Vt          WBC                      04/28/18  0521 04/28/18  0452 04/28/18  0440 04/28/18  0420 04/28/18  0400      Allens Test   Pass       Anion Gap     8     aPTT          Baso #     0.01     Basophil%     0.1     BNP     724  Comment:  Values of less than 100 pg/ml are consistent with non-CHF populations.(H)     Site   [facility]       BUN, Bld     16     Calcium     7.9(L)     Chloride     111(H)     CO2     24     Creatinine     0.9     DelSys   Adult Vent       Differential Method     Automated     eGFR if      >60     eGFR if non      60  Comment:  Calculation used to obtain the estimated glomerular filtration  rate (eGFR) is the CKD-EPI equation.        Eos #     0.0     Eosinophil%     0.1     Fibrinogen          FiO2   40       Glucose     99     Gran # (ANC)     11.3(H)     Gran%     77.0(H)     Hematocrit     30.4(L)     Hemoglobin     10.3(L)     Coumadin Monitoring INR          Lymph #     1.4     Lymph%     9.4(L)     Magnesium     2.4     MCH     30.1     MCHC     33.9     MCV     89     Mode   PSV       Mono #     2.1(H)     Mono%     14.0     MPV      9.7     PEEP          Phosphorus          Platelets     181     POC ACTIVATED CLOTTING TIME K          POC BE   0       POC Glucose          POC HCO3   23.7(L)       POC Hematocrit          POC Ionized Calcium          POC PCO2   33.6(L)       POC PH   7.456(H)       POC PO2   65(L)       POC Potassium          POC SATURATED O2   94(L)       POC Sodium          POCT Glucose 148(H) 115(H)  89      Potassium     3.3(L)     Protime          PS   10       Rate          RBC     3.42(L)     RDW     17.8(H)     Sample   ARTERIAL       Sodium     143     Vt          WBC     14.84(H)                 04/28/18  0310 04/28/18  0158 04/28/18  0057 04/27/18  2356 04/27/18  2259      Allens Test          Anion Gap          aPTT          Baso #          Basophil%          BNP          Site          BUN, Bld          Calcium          Chloride          CO2          Creatinine          DelSys          Differential Method          eGFR if           eGFR if non           Eos #          Eosinophil%          Fibrinogen          FiO2          Glucose          Gran # (ANC)          Gran%          Hematocrit          Hemoglobin          Coumadin Monitoring INR          Lymph #          Lymph%          Magnesium          MCH          MCHC          MCV          Mode          Mono #          Mono%          MPV          PEEP          Phosphorus          Platelets          POC ACTIVATED CLOTTING TIME K          POC BE          POC Glucose          POC HCO3          POC Hematocrit          POC Ionized Calcium          POC PCO2          POC PH          POC PO2          POC Potassium          POC SATURATED O2          POC Sodium          POCT Glucose 90 102 105 116(H) 113(H)     Potassium          Protime          PS          Rate          RBC          RDW          Sample          Sodium          Vt          WBC                      04/27/18  2159 04/27/18  2101 04/27/18  2010 04/27/18  1915 04/27/18  1903       Allens Test          Anion Gap     7(L)     aPTT          Baso #          Basophil%          BNP          Site          BUN, Bld     16     Calcium     7.7(L)     Chloride     112(H)     CO2     24     Creatinine     0.9     DelSys          Differential Method          eGFR if      >60     eGFR if non      60  Comment:  Calculation used to obtain the estimated glomerular filtration  rate (eGFR) is the CKD-EPI equation.        Eos #          Eosinophil%          Fibrinogen          FiO2          Glucose     193(H)     Gran # (ANC)          Gran%          Hematocrit          Hemoglobin          Coumadin Monitoring INR          Lymph #          Lymph%          Magnesium     1.6     MCH          MCHC          MCV          Mode          Mono #          Mono%          MPV          PEEP          Phosphorus     2.4(L)     Platelets          POC ACTIVATED CLOTTING TIME K          POC BE          POC Glucose          POC HCO3          POC Hematocrit          POC Ionized Calcium          POC PCO2          POC PH          POC PO2          POC Potassium          POC SATURATED O2          POC Sodium          POCT Glucose 117(H) 156(H) 178(H) 187(H)      Potassium     3.6     Protime          PS          Rate          RBC          RDW          Sample          Sodium     143     Vt          WBC                      04/27/18  1839 04/27/18  1836 04/27/18  1833 04/27/18  1713 04/27/18  1713      Allens Test          Anion Gap          aPTT 31.9  Comment:  aPTT therapeutic range = 39-69 seconds         Baso # 0.01         Basophil% 0.1         BNP          Site  Tosha/UAC        BUN, Bld          Calcium          Chloride          CO2          Creatinine          DelSys  Adult Vent        Differential Method Automated         eGFR if           eGFR if non           Eos # 0.2         Eosinophil% 1.2         Fibrinogen 105(L)         FiO2  70        Glucose          Gran #  (ANC) 9.4(H)         Gran% 74.7(H)         Hematocrit 24.6(L)         Hemoglobin 8.4(L)         Coumadin Monitoring INR 1.4  Comment:  Coumadin Therapy:  2.0 - 3.0 for INR for all indicators except mechanical heart valves  and antiphospholipid syndromes which should use 2.5 - 3.5.  (H)         Lymph # 1.6         Lymph% 12.8(L)         Magnesium          MCH 32.1(H)         MCHC 34.1         MCV 94         Mode  SIMV        Mono # 1.4(H)         Mono% 11.2         MPV 10.1         PEEP  5        Phosphorus          Platelets 161         POC ACTIVATED CLOTTING TIME K     109     POC BE  3  1      POC Glucose    229(H)      POC HCO3  27.3  25.5      POC Hematocrit    23(L)      POC Ionized Calcium    0.94(L)      POC PCO2  38.7  41.8      POC PH  7.456(H)  7.394      POC PO2  75(L)  293(H)      POC Potassium    3.0(L)      POC SATURATED O2  96  100      POC Sodium    146(H)      POCT Glucose   179(H)       Potassium          Protime 14.3(H)         PS  10        Rate  12        RBC 2.62(L)         RDW 15.1(H)         Sample  ARTERIAL  ARTERIAL unknown     Sodium          Vt  500        WBC 12.55                         Significant Imaging: Echocardiogram:   2D echo with color flow doppler:   Results for orders placed or performed during the hospital encounter of 04/26/18   2D echo with color flow doppler   Result Value Ref Range    EF 60 55 - 65    Mitral Valve Regurgitation MILD     Est. PA Systolic Pressure 22.89     Mitral Valve Mobility NORMAL     Tricuspid Valve Regurgitation TRIVIAL     and X-Ray: CXR: X-Ray Chest PA and Lateral (CXR): No results found for this visit on 04/26/18.    Assessment and Plan:     * CAD, multiple vessel    S/p CABG  Cont asa, plavix  Statin intolerant  Start BB if HR stable  Cont IS  Chest tube management per CTS          Stage 3 chronic kidney disease    Monitor        Unstable angina    -Patient underwent LHC to evaluate symptoms.   -Severe multivessel CAD with Normal LVEF  -s/p  CABG            VTE Risk Mitigation         Ordered     enoxaparin injection 40 mg  Daily      04/28/18 0902     IP VTE HIGH RISK PATIENT  Once      04/26/18 1446      Critical Care Time: 45 minutes  Critical secondary to Patient has a condition that poses threat to life and bodily function:    cad sp cABG with chest tubes, CKD, unstable angina  Critical care was time spent personally by me on the following activities: development of treatment plan with patient or surrogate and bedside caregivers, discussions with consultants, evaluation of patient's response to treatment, examination of patient, ordering and performing treatments and interventions, ordering and review of laboratory studies, ordering and review of cardiac and radiographic studies, telemetry and re-evaluation of patient's condition. This critical care time did not overlap with that of any other provider or involve time for any procedures.  Thank you for your consult. I will follow-up with patient. Please contact us if you have any additional questions.    Bg Michelle Md, MD  Cardiology   [facility]

## 2018-04-28 NOTE — ASSESSMENT & PLAN NOTE
-Patient underwent LHC to evaluate symptoms.   -Severe multivessel CAD with Normal LVEF  -s/p CABG

## 2018-04-28 NOTE — PROGRESS NOTES
Dr. Cam notified of patient's H/H at 8.4/24.6 and preop H/H at 12.4/37.2.  Orders received to transfuse 1 unit.

## 2018-04-28 NOTE — OP NOTE
DATE OF PROCEDURE:  04/27/2018.    PREOPERATIVE DIAGNOSES:  1.  Symptomatic multivessel coronary artery disease.  2.  History of percutaneous coronary intervention and stenting.  3.  Type 2 diabetes mellitus.  4.  Uncontrolled hypertension.  5.  Hyperlipidemia.  6.  Hypothyroid.  7.  Obesity.  8.  NYHA class 2.    POSTOPERATIVE DIAGNOSES:  1.  Symptomatic multivessel coronary artery disease.  2.  History of percutaneous coronary intervention and stenting.  3.  Type 2 diabetes mellitus.  4.  Uncontrolled hypertension.  5.  Hyperlipidemia.  6.  Hypothyroid.  7.  Obesity.  8.  NYHA class 2.    PROCEDURES:  1.  Coronary artery bypass grafting x2.  A.  In situ LIMA to the LAD.  B.  Saphenous vein graft aorta to the obtuse marginal.  2.  Endoscopic vein harvesting of the greater saphenous vein of the left leg.  3.  Radial arterial line.  4.  Transesophageal echocardiography.    SURGEON:  Inocente Cam M.D.    ASSISTANT:  [place] Roblero PA-C.    ANESTHESIA:  General.    FINDINGS:  1.  Normal left ventricular function.  2.  Normal caliber and quality vein.  3.  Normal caliber and quality LIMA.  4.  Diffusely atherosclerotic and calcified LAD.  A 1.5 mm mid LAD target.  5.  Diffusely diseased marginal.  A 1.5 mm target.    DRAINS:  Three times 24-Tai[place]ese chest tubes, 1 left pleural and 2 mediastinum.    Wires, 1 right atrial epicardial pacing wire and one ground wire.    COMPLICATION:  None.    CONDITION:  Stable.    INDICATION FOR OPERATION:  Ms. Holden is a pleasant 82-year-old lady who   has been having angina with exertion and some shortness of breath really over   the last year or so.  Workup led to coronary catheterization, which demonstrated   significant multivessel disease, not amenable to PCI.  She was referred for   coronary bypass grafting.  We discussed the operation and its risks and details   and she elected to proceed.    OPERATIVE DESCRIPTION:  The patient was brought to the Operating Room, placed in    the supine position.  She was given anesthesia and preoperative lines were   placed.  Preoperative antibiotics were infused.  She was then prepped and draped   in standard fashion.  Standard midline sternal incision was made.  This was   carried down the sternum, which was split with a saw.  Concurrently, an incision   was made in the left leg for endoscopic vein harvesting of the greater   saphenous vein.  The vein was of good caliber and quality and prepared in the   normal manner.  A mammary retractor was inserted and the left internal mammary   artery was harvested in a pedicled fashion.  The mammary was of good caliber and   quality.  The mammary was then prepared in the normal manner.  A standard   sternal retractor was inserted and the chest was opened widely.  The pericardium   was opened and appropriate dosage of heparin was given.  The heart was then   positioned for exposure of the LAD.  This was a diffusely diseased vessel.  In   the mid to distal LAD, an arteriotomy was made and a 1.5 mm shunt was inserted.    The LIMA was then anastomosed to the LAD in an end-to-side fashion with a   running Prolene suture.  The heart was then positioned for exposure of the   obtuse marginal.  The patient was not able to tolerate positioning.  We then   gave additional heparin for cardiopulmonary bypass.  The ascending aorta was   cannulated in the standard fashion.  Venous cannulation was conducted via the   right atrial appendage.  Once the ACT was appropriate, we went on   cardiopulmonary bypass.  The heart was positioned for exposure of the obtuse   marginal.  Arteriotomy was made and a 1.5 mm shunt was inserted.  Saphenous vein   was then anastomosed to the obtuse marginal in an end-to-side fashion with   running Prolene suture.  The heart was then returned to its normal position   within the chest cavity.  One proximal vein anastomosis to the proximal aorta   was conducted using the heartstring device.  This was  done in the standard   fashion with a running Prolene suture.  Distal and proximal anastomoses were   then checked for hemostasis.  We then weaned from cardiopulmonary bypass without   issue.  The venous cannula was removed.  Protamine was started and the arterial   cannula was removed.  Three time 24-Syriac chest tubes were inserted, 1 left   pleural and 2 mediastinum.  An epicardial pacing wire was placed on the surface   of the right atrium.  Once hemostasis was obtained, the pericardium was closed   over the proximal aorta.  The sternum was then reapproximated with wires and the   skin and subcutaneous tissues were closed in layers.  The patient tolerated the   procedure well and was taken to ICU in stable condition.      ALBA  dd: 04/27/2018 18:05:14 (CDT)  td: 04/27/2018 21:51:45 (CDT)  Doc ID   #[id number]  Job ID #[id number]    CC:

## 2018-04-28 NOTE — CONSULTS
[facility] -   Critical Care Medicine  Consult Note    Patient Name: Loida Holden  MRN: [id number]  Admission Date: 4/26/2018  Hospital Length of Stay: 1 days  Code Status: Full Code  Attending Physician: Robb Mukherjee MD   Primary Care Provider: YAKELIN Richardson MD   Principal Problem: On mechanically assisted ventilation      Subjective:     HPI:  82 year old female with extensive PMH including CAD with remote PCI along with 2012 [facility] eval with non obstructive dz; HTN; HLD; DM2; obesity; anxiety; CKD3; meningioma  Admitted on 4/26 for [facility] eval s/t intermittent CP with exertion and activity intolerance  [facility] revealed multi vessel CAD with nl EF    Hospital/ICU Course:  4/27 - transferred to ICU sedated post anesthesia with OETT on mechanical ventilation along with levophed support and insulin infusion    Past Medical History:   Diagnosis Date    Angina pectoris     Anxiety     Arthritis     neck, back,     Brain cyst     x2    Colon polyp     Coronary artery disease     s/p PTCA    Diabetes mellitus type 2 without retinopathy     Diabetes type 2, controlled 2/17/2016    Diverticulitis of intestine without hemorrhage 11/10/2015    Dry senile macular degeneration     Ex-smoker 11/10/2015    Fatty liver     Gastritis     Gout     Herpes genitalia     Hiatal hernia     History of shingles     Hx of colonic polyps     8/21/09    Hyperlipidemia     Hypertension     Hypothyroid     Meningioma     6/14 mri dr cuevas    Obesity (BMI 30.0-34.9) 12/2/2015    Osteopenia 4/15 klever 4/17    Pneumonia     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 hx of seizures    Stroke     Tobacco dependence     Type 2 diabetes mellitus with stage 3 chronic kidney disease, without long-term current use of insulin      Type 2 diabetes mellitus without complication        Past Surgical History:   Procedure Laterality Date    CARDIAC CATHETERIZATION      [facility] 2-MRI safe to 3T    CATARACT EXTRACTION W/  INTRAOCULAR LENS IMPLANT  OD w/YAG    CATARACT EXTRACTION W/  INTRAOCULAR LENS IMPLANT  OS w/YAG    CHOLECYSTECTOMY      2/2012    CORONARY ANGIOPLASTY      HYSTERECTOMY  1970s       Review of patient's allergies indicates:   Allergen Reactions    Codeine      Other reaction(s): Unknown    Diprivan [propofol]     Doxycycline      Other reaction(s): Unknown    Iodine and iodide containing products      Other reaction(s): Unknown    Ivp dye  [iodinated contrast- oral and iv dye]      Other reaction(s): Unknown    Latex, natural rubber Hives    Metronidazole hcl      Other reaction(s): Unknown  Other reaction(s): Unknown    Pantoprazole      Difficulty urinating      Procaine      Other reaction(s): Unknown  Other reaction(s): Unknown    Propofol analogues Other (See Comments)     Passes out     Rosuvastatin      Other reaction(s): liver enlargement  Other reaction(s): liver enlargement    Simvastatin      Other reaction(s): Unknown  Liver problems      Sulfasalazine Other (See Comments)       Family History     Problem Relation (Age of Onset)    Cancer Mother    Diabetes Maternal Grandfather    Heart disease Mother, Son    Liver disease Mother    Psoriasis Maternal Grandmother        Social History Main Topics    Smoking status: Former Smoker     Packs/day: 0.50     Years: 30.00     Quit date: 1/1/2002    Smokeless tobacco: Never Used    Alcohol use No    Drug use: No    Sexual activity: Not Currently         Review of Systems   Unable to perform ROS: Intubated     Objective:     Vital Signs (Most Recent):  Temp: 98.6 °F (37 °C) (04/27/18 1845)  Pulse: 71 (04/27/18 1845)  Resp: 14 (04/27/18 1845)  BP: (!) 194/81 (04/27/18 1204)  SpO2: 97 % (04/27/18 1845) Vital Signs (24h Range):  Temp:  [97 °F (36.1  °C)-98.6 °F (37 °C)] 98.6 °F (37 °C)  Pulse:  [54-71] 71  Resp:  [12-18] 14  SpO2:  [91 %-97 %] 97 %  BP: (135-194)/(60-81) 194/81     Weight: 83.8 kg (184 lb 11.9 oz)  Body mass index is 31.71 kg/m².      Intake/Output Summary (Last 24 hours) at 04/27/18 1849  Last data filed at 04/27/18 1832   Gross per 24 hour   Intake             6140 ml   Output              600 ml   Net             5540 ml       Physical Exam   Constitutional: Vital signs are normal. She appears ill. She is sedated and intubated.   HENT:   Head: Atraumatic.   Eyes: Conjunctivae are normal.   Neck: No JVD present. No tracheal deviation present.   Cardiovascular: Normal rate and regular rhythm.  Exam reveals distant heart sounds and friction rub.    Pulses:       Radial pulses are 2+ on the right side, and 2+ on the left side.        Dorsalis pedis pulses are 2+ on the right side, and 2+ on the left side.   Pulmonary/Chest: She is intubated. She has decreased breath sounds. She has no wheezes. She has no rhonchi. She has no rales.   Vent controlled respiratory effort   Abdominal: Soft. She exhibits no distension. Bowel sounds are absent.   Musculoskeletal: She exhibits no edema.   Neurological: She is unresponsive.   Skin: Skin is warm and dry. Capillary refill takes less than 2 seconds.            Vents:  Vent Mode: SIMV (04/27/18 1835)  Set Rate: 12 bmp (04/27/18 1835)  Vt Set: 500 mL (04/27/18 1835)  Pressure Support: 10 cmH20 (04/27/18 1835)  PEEP/CPAP: 5 cmH20 (04/27/18 1835)  Peak Airway Pressure: 25 cmH2O (04/27/18 1835)  Total Ve: 6.2 mL (04/27/18 1835)    Lines/Drains/Airways     Central Venous Catheter Line                 Introducer 04/27/18 1835 right internal jugular less than 1 day         Pulmonary Artery Catheter Assessment  04/27/18 1421 less than 1 day          Drain                 Closed/Suction Drain 04/27/18 1718 Left Leg Bulb 19 Fr. less than 1 day         Urethral Catheter 04/27/18 1340 Temperature probe 16 Fr. less than  1 day         Y Chest Tube 1 and 2 04/27/18 1704 1 Left Pleural 24 Fr. 2 Mediastinal 24 Fr. less than 1 day         Y Chest Tube 3 and 4 04/27/18 1705 3 Right Pleural 24 Fr. less than 1 day          Airway                 Airway - Non-Surgical 04/27/18 1320 Endotracheal Tube less than 1 day          Peripheral Intravenous Line                 Peripheral IV - Single Lumen 04/26/18 0938 Left Wrist 1 day         Peripheral IV - Single Lumen 04/27/18 1325 Right Wrist less than 1 day                Significant Labs:    CBC/Anemia Profile:    Recent Labs  Lab 04/26/18  0935 04/27/18  0450 04/27/18  1329  04/27/18  1641 04/27/18  1654 04/27/18  1713   WBC 9.39 6.89  --   --   --   --   --    HGB 14.4 11.8* 12.4  --   --   --   --    HCT 42.2 35.9* 37.2  < > 25* 26* 23*    114* 113*  --   --   --   --    MCV 94 95  --   --   --   --   --    RDW 14.1 14.7*  --   --   --   --   --    < > = values in this interval not displayed.     Chemistries:    Recent Labs  Lab 04/26/18  0935 04/27/18  0450   * 141   K 3.8 3.1*    109   CO2 23 25   BUN 24* 20   CREATININE 1.5* 1.1   CALCIUM 9.5 8.5*   ALBUMIN  --  3.1*   PROT  --  5.8*   BILITOT  --  0.4   ALKPHOS  --  90   ALT  --  30   AST  --  33       All pertinent labs within the past 24 hours have been reviewed.  ABG    Recent Labs  Lab 04/27/18  1836   PH 7.456*   PO2 75*   PCO2 38.7   HCO3 27.3   BE 3       Significant Imaging:   I have reviewed all pertinent imaging results/findings within the past 24 hours.    Assessment/Plan:     Pulmonary   On mechanically assisted ventilation    Settings reviewed and adjusted for optimal gas exchange  Keep sat > 92  Wean to liberate per protocol once fully awake  VAP prophylaxis        Cardiac/Vascular   CAD, multiple vessel    Now s/p CABG x 2   CVT and cardiology following  Continue ASA, plavix, metoprolol        Renal/   Stage 3 chronic kidney disease    Strict I/O  Monitor creatinine trend        Endocrine   Type 2  diabetes mellitus with stage 3 chronic kidney disease, without long-term current use of insulin    Insulin infusion with hourly monitoring and adjustments for glucose control and prevention of insulin toxicity            Critical Care Daily Checklist:    A: Awake: RASS Goal/Actual Goal: RASS Goal: 0-->alert and calm  Actual: Sky Agitation Sedation Scale (RASS): Alert and calm   B: Spontaneous Breathing Trial Performed?     C: SAT & SBT Coordinated?  planned                      D: Delirium: CAM-ICU Overall CAM-ICU: Negative   E: Early Mobility Performed? ROM   F: Feeding Goal:    Status:     Current Diet Order   NPO except for meds via OG      AS: Analgesia/Sedation prn   T: Thromboembolic Prophylaxis lovenox   H: HOB > 300 yes   U: Stress Ulcer Prophylaxis (if needed) pepcid   G: Glucose Control Insulin infusion   B: Bowel Function     I: Indwelling Catheter (Lines & Live) Necessity reviewed   D: De-escalation of Antimicrobials/Pharmacotherapies reviewed    Plan for the day/ETD Pressor support; vent support; hemodynamic monitoring; insulin infusion; wean to extubate per protocol    Code Status:  Family/Goals of Care: Full Code  Anticipate home post CABG recovery   I have discussed case and plan of care in detail with Dr Montalvo and Dr Cam; Status and plan of care were discussed with team on multidisciplinary rounds.    Critical Care Time: 52 minutes  Critical secondary to post op mechanical ventilation and pressor infusion   Critical care was time spent personally by me on the following activities: development of treatment plan with patient or surrogate and bedside caregivers, discussions with consultants, evaluation of patient's response to treatment, examination of patient, ordering and performing treatments and interventions, ordering and review of laboratory studies, ordering and review of radiographic studies, pulse oximetry, re-evaluation of patient's condition. This critical care time did not  overlap with that of any other provider or involve time for any procedures.    Thank you for your consult.      Yessica Rutledge NP  Critical Care Medicine  [facility] [place]

## 2018-04-28 NOTE — SUBJECTIVE & OBJECTIVE
Past Medical History:   Diagnosis Date    Angina pectoris     Anxiety     Arthritis     neck, back,     Brain cyst     x2    Colon polyp     Coronary artery disease     s/p PTCA    Diabetes mellitus type 2 without retinopathy     Diabetes type 2, controlled 2/17/2016    Diverticulitis of intestine without hemorrhage 11/10/2015    Dry senile macular degeneration     Ex-smoker 11/10/2015    Fatty liver     Gastritis     Gout     Herpes genitalia     Hiatal hernia     History of shingles     Hx of colonic polyps     8/21/09    Hyperlipidemia     Hypertension     Hypothyroid     Meningioma     6/14 mri dr cuevas    Obesity (BMI 30.0-34.9) 12/2/2015    Osteopenia 4/15 klever 4/17    Pneumonia     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 hx of seizures    Stroke     Tobacco dependence     Type 2 diabetes mellitus with stage 3 chronic kidney disease, without long-term current use of insulin     Type 2 diabetes mellitus without complication        Past Surgical History:   Procedure Laterality Date    CARDIAC CATHETERIZATION      [employer] 2-MRI safe to 3T    CATARACT EXTRACTION W/  INTRAOCULAR LENS IMPLANT  OD w/YAG    CATARACT EXTRACTION W/  INTRAOCULAR LENS IMPLANT  OS w/YAG    CHOLECYSTECTOMY      2/2012    CORONARY ANGIOPLASTY      HYSTERECTOMY  1970s       Review of patient's allergies indicates:   Allergen Reactions    Codeine      Other reaction(s): Unknown    Diprivan [propofol]     Doxycycline      Other reaction(s): Unknown    Iodine and iodide containing products      Other reaction(s): Unknown    Ivp dye  [iodinated contrast- oral and iv dye]      Other reaction(s): Unknown    Latex, natural rubber Hives    Metronidazole hcl      Other reaction(s): Unknown  Other reaction(s): Unknown     Pantoprazole      Difficulty urinating      Procaine      Other reaction(s): Unknown  Other reaction(s): Unknown    Propofol analogues Other (See Comments)     Passes out     Rosuvastatin      Other reaction(s): liver enlargement  Other reaction(s): liver enlargement    Simvastatin      Other reaction(s): Unknown  Liver problems      Sulfasalazine Other (See Comments)       No current facility-administered medications on file prior to encounter.      Current Outpatient Prescriptions on File Prior to Encounter   Medication Sig    allopurinol (ZYLOPRIM) 100 MG tablet TAKE ONE TABLET BY MOUTH ONCE DAILY    betamethasone valerate 0.1% (VALISONE) 0.1 % Crea APPLY TOPICALLY TWO TIMES DAILY    BIOTIN ORAL Take 5,000 mcg by mouth once daily.    calcium carbonate (TUMS) 200 mg calcium (500 mg) chewable tablet Take 1 tablet by mouth once daily.    clopidogrel (PLAVIX) 75 mg tablet Take 1 tablet (75 mg total) by mouth once daily.    furosemide (LASIX) 40 MG tablet Take 1 tablet (40 mg total) by mouth 2 (two) times daily.    isosorbide mononitrate (IMDUR) 60 MG 24 hr tablet Take 1 tablet (60 mg total) by mouth once daily.    levothyroxine (SYNTHROID) 50 MCG tablet TAKE ONE TABLET BY MOUTH ONCE DAILY BEFORE BREAKFAST    lisinopril 10 MG tablet Take 10 mg by mouth once daily.     MAG/ALUMINUM/SOD BICARB/ALGINC (GAVISCON ORAL) Take by mouth.    metoprolol tartrate (LOPRESSOR) 50 MG tablet Take 2 tablets (100 mg total) by mouth 2 (two) times daily.    pantoprazole (PROTONIX) 40 MG tablet     potassium chloride SA (K-DUR,KLOR-CON) 20 MEQ tablet Take 1 tablet (20 mEq total) by mouth 2 (two) times daily.    walker Misc Use rollator walker to reduce stress on knee    ACCU-CHEK [place] PLUS METER [facility] [place]     ACCU-CHEK SOFTCLIX LANCETS Misc     amoxicillin (AMOXIL) 875 MG tablet Take 875 mg by mouth 2 (two) times daily.    blood sugar diagnostic (TRUETEST TEST STRIPS) Strp 1 strip by Misc.(Non-Drug; Combo Route) route 3  (three) times daily. True test strips    lancets 33 gauge Misc 1 lancet by Misc.(Non-Drug; Combo Route) route 3 (three) times daily.     Family History     Problem Relation (Age of Onset)    Cancer Mother    Diabetes Maternal Grandfather    Heart disease Mother, Son    Liver disease Mother    Psoriasis Maternal Grandmother        Social History Main Topics    Smoking status: Former Smoker     Packs/day: 0.50     Years: 30.00     Quit date: 1/1/2002    Smokeless tobacco: Never Used    Alcohol use No    Drug use: No    Sexual activity: Not Currently     Review of Systems   Constitution: Negative.   HENT: Negative.    Eyes: Negative.    Cardiovascular: Positive for chest pain.   Respiratory: Negative.    Endocrine: Negative.    Hematologic/Lymphatic: Negative.    Skin: Negative.    Musculoskeletal: Negative.    Gastrointestinal: Negative.    Genitourinary: Negative.    Neurological: Negative.    Psychiatric/Behavioral: Negative.    Allergic/Immunologic: Negative.      Objective:     Vital Signs (Most Recent):  Temp: 99.1 °F (37.3 °C) (04/28/18 1115)  Pulse: 73 (04/28/18 1100)  Resp: 18 (04/28/18 1100)  BP: (!) 87/26 (04/28/18 1100)  SpO2: (!) 93 % (04/28/18 1100) Vital Signs (24h Range):  Temp:  [98.1 °F (36.7 °C)-99.2 °F (37.3 °C)] 99.1 °F (37.3 °C)  Pulse:  [62-75] 73  Resp:  [12-29] 18  SpO2:  [91 %-100 %] 93 %  BP: ()/(26-99) 87/26     Weight: 92.4 kg (203 lb 11.3 oz)  Body mass index is 34.97 kg/m².    SpO2: (!) 93 %  O2 Device (Oxygen Therapy): nasal cannula      Intake/Output Summary (Last 24 hours) at 04/28/18 1217  Last data filed at 04/28/18 1115   Gross per 24 hour   Intake          7707.63 ml   Output             2383 ml   Net          5324.63 ml       Lines/Drains/Airways     Central Venous Catheter Line                 Introducer 04/27/18 1835 right internal jugular less than 1 day          Drain                 Closed/Suction Drain 04/27/18 1718 Left Leg Bulb 19 Fr. less than 1 day          Urethral Catheter 04/27/18 1340 Temperature probe 16 Fr. less than 1 day         Y Chest Tube 1 and 2 04/27/18 1704 1 Left Pleural 24 Fr. 2 Mediastinal 24 Fr. less than 1 day         Y Chest Tube 3 and 4 04/27/18 1705 3 Right Pleural 24 Fr. less than 1 day          Arterial Line                 Arterial Line 04/27/18  Left Radial 1 day          Line                 Pacer Wires 04/27/18 1724 less than 1 day          Peripheral Intravenous Line                 Peripheral IV - Single Lumen 04/26/18 0938 Left Wrist 2 days         Peripheral IV - Single Lumen 04/27/18 1325 Right Wrist less than 1 day                Physical Exam   Constitutional: She is oriented to person, place, and time. She appears well-developed and well-nourished. No distress.   HENT:   Head: Normocephalic and atraumatic.   Nose: Nose normal.   Mouth/Throat: Oropharynx is clear and moist.   Eyes: Conjunctivae and EOM are normal. No scleral icterus.   Neck: Normal range of motion. Neck supple. No JVD present. No thyromegaly present.   Cardiovascular: Normal rate, regular rhythm, S1 normal and S2 normal.  Exam reveals friction rub. Exam reveals no gallop, no S3 and no S4.    Murmur heard.  Pulmonary/Chest: Effort normal and breath sounds normal. No stridor. No respiratory distress. She has no wheezes. She has no rales (coarse bs). She exhibits tenderness.   Chest tubes in place; medial sternotomy dressing c/d/i   Abdominal: Soft. Bowel sounds are normal. She exhibits no distension and no mass. There is no tenderness. There is no rebound.   Genitourinary:   Genitourinary Comments: Deferred   Musculoskeletal: Normal range of motion. She exhibits no edema, tenderness or deformity.   Lymphadenopathy:     She has no cervical adenopathy.   Neurological: She is alert and oriented to person, place, and time. She exhibits normal muscle tone. Coordination normal.   Skin: Skin is warm and dry. No rash noted. She is not diaphoretic. No erythema. No pallor.    Psychiatric: She has a normal mood and affect. Her behavior is normal. Judgment and thought content normal.   Nursing note and vitals reviewed.      Significant Labs:   All pertinent lab results from the last 24 hours have been reviewed. and   Recent Lab Results  (Last 25 results in the past 24 hours)      04/28/18  1114 04/28/18  0956 04/28/18  0859 04/28/18  0720 04/28/18  0603      Allens Test          Anion Gap          aPTT          Baso #          Basophil%          BNP          Site          BUN, Bld          Calcium          Chloride          CO2          Creatinine          DelSys          Differential Method          eGFR if           eGFR if non           Eos #          Eosinophil%          Fibrinogen          FiO2          Glucose          Gran # (ANC)          Gran%          Hematocrit          Hemoglobin          Coumadin Monitoring INR          Lymph #          Lymph%          Magnesium          MCH          MCHC          MCV          Mode          Mono #          Mono%          MPV          PEEP          Phosphorus          Platelets          POC ACTIVATED CLOTTING TIME K          POC BE          POC Glucose          POC HCO3          POC Hematocrit          POC Ionized Calcium          POC PCO2          POC PH          POC PO2          POC Potassium          POC SATURATED O2          POC Sodium          POCT Glucose 196(H) 209(H) 197(H) 168(H) 168(H)     Potassium          Protime          PS          Rate          RBC          RDW          Sample          Sodium          Vt          WBC                      04/28/18  0521 04/28/18  0452 04/28/18  0440 04/28/18  0420 04/28/18  0400      Allens Test   Pass       Anion Gap     8     aPTT          Baso #     0.01     Basophil%     0.1     BNP     724  Comment:  Values of less than 100 pg/ml are consistent with non-CHF populations.(H)     Site   [facility]       BUN, Bld     16     Calcium     7.9(L)     Chloride      111(H)     CO2     24     Creatinine     0.9     DelSys   Adult Vent       Differential Method     Automated     eGFR if      >60     eGFR if non      60  Comment:  Calculation used to obtain the estimated glomerular filtration  rate (eGFR) is the CKD-EPI equation.        Eos #     0.0     Eosinophil%     0.1     Fibrinogen          FiO2   40       Glucose     99     Gran # (ANC)     11.3(H)     Gran%     77.0(H)     Hematocrit     30.4(L)     Hemoglobin     10.3(L)     Coumadin Monitoring INR          Lymph #     1.4     Lymph%     9.4(L)     Magnesium     2.4     MCH     30.1     MCHC     33.9     MCV     89     Mode   PSV       Mono #     2.1(H)     Mono%     14.0     MPV     9.7     PEEP          Phosphorus          Platelets     181     POC ACTIVATED CLOTTING TIME K          POC BE   0       POC Glucose          POC HCO3   23.7(L)       POC Hematocrit          POC Ionized Calcium          POC PCO2   33.6(L)       POC PH   7.456(H)       POC PO2   65(L)       POC Potassium          POC SATURATED O2   94(L)       POC Sodium          POCT Glucose 148(H) 115(H)  89      Potassium     3.3(L)     Protime          PS   10       Rate          RBC     3.42(L)     RDW     17.8(H)     Sample   ARTERIAL       Sodium     143     Vt          WBC     14.84(H)                 04/28/18  0310 04/28/18  0158 04/28/18  0057 04/27/18  2356 04/27/18  2259      Allens Test          Anion Gap          aPTT          Baso #          Basophil%          BNP          Site          BUN, Bld          Calcium          Chloride          CO2          Creatinine          DelSys          Differential Method          eGFR if           eGFR if non           Eos #          Eosinophil%          Fibrinogen          FiO2          Glucose          Gran # (ANC)          Gran%          Hematocrit          Hemoglobin          Coumadin Monitoring INR          Lymph #          Lymph%           Magnesium          MCH          MCHC          MCV          Mode          Mono #          Mono%          MPV          PEEP          Phosphorus          Platelets          POC ACTIVATED CLOTTING TIME K          POC BE          POC Glucose          POC HCO3          POC Hematocrit          POC Ionized Calcium          POC PCO2          POC PH          POC PO2          POC Potassium          POC SATURATED O2          POC Sodium          POCT Glucose 90 102 105 116(H) 113(H)     Potassium          Protime          PS          Rate          RBC          RDW          Sample          Sodium          Vt          WBC                      04/27/18  2159 04/27/18  2101 04/27/18  2010 04/27/18  1915 04/27/18  1903      Allens Test          Anion Gap     7(L)     aPTT          Baso #          Basophil%          BNP          Site          BUN, Bld     16     Calcium     7.7(L)     Chloride     112(H)     CO2     24     Creatinine     0.9     DelSys          Differential Method          eGFR if      >60     eGFR if non      60  Comment:  Calculation used to obtain the estimated glomerular filtration  rate (eGFR) is the CKD-EPI equation.        Eos #          Eosinophil%          Fibrinogen          FiO2          Glucose     193(H)     Gran # (ANC)          Gran%          Hematocrit          Hemoglobin          Coumadin Monitoring INR          Lymph #          Lymph%          Magnesium     1.6     MCH          MCHC          MCV          Mode          Mono #          Mono%          MPV          PEEP          Phosphorus     2.4(L)     Platelets          POC ACTIVATED CLOTTING TIME K          POC BE          POC Glucose          POC HCO3          POC Hematocrit          POC Ionized Calcium          POC PCO2          POC PH          POC PO2          POC Potassium          POC SATURATED O2          POC Sodium          POCT Glucose 117(H) 156(H) 178(H) 187(H)      Potassium     3.6     Protime          PS           Rate          RBC          RDW          Sample          Sodium     143     Vt          WBC                      04/27/18  1839 04/27/18  1836 04/27/18  1833 04/27/18  1713 04/27/18  1713      Allens Test          Anion Gap          aPTT 31.9  Comment:  aPTT therapeutic range = 39-69 seconds         Baso # 0.01         Basophil% 0.1         BNP          Site  Tosha/UAC        BUN, Bld          Calcium          Chloride          CO2          Creatinine          DelSys  Adult Vent        Differential Method Automated         eGFR if           eGFR if non           Eos # 0.2         Eosinophil% 1.2         Fibrinogen 105(L)         FiO2  70        Glucose          Gran # (ANC) 9.4(H)         Gran% 74.7(H)         Hematocrit 24.6(L)         Hemoglobin 8.4(L)         Coumadin Monitoring INR 1.4  Comment:  Coumadin Therapy:  2.0 - 3.0 for INR for all indicators except mechanical heart valves  and antiphospholipid syndromes which should use 2.5 - 3.5.  (H)         Lymph # 1.6         Lymph% 12.8(L)         Magnesium          MCH 32.1(H)         MCHC 34.1         MCV 94         Mode  SIMV        Mono # 1.4(H)         Mono% 11.2         MPV 10.1         PEEP  5        Phosphorus          Platelets 161         POC ACTIVATED CLOTTING TIME K     109     POC BE  3  1      POC Glucose    229(H)      POC HCO3  27.3  25.5      POC Hematocrit    23(L)      POC Ionized Calcium    0.94(L)      POC PCO2  38.7  41.8      POC PH  7.456(H)  7.394      POC PO2  75(L)  293(H)      POC Potassium    3.0(L)      POC SATURATED O2  96  100      POC Sodium    146(H)      POCT Glucose   179(H)       Potassium          Protime 14.3(H)         PS  10        Rate  12        RBC 2.62(L)         RDW 15.1(H)         Sample  ARTERIAL  ARTERIAL unknown     Sodium          Vt  500        WBC 12.55                         Significant Imaging: Echocardiogram:   2D echo with color flow doppler:   Results for orders placed  or performed during the hospital encounter of 04/26/18   2D echo with color flow doppler   Result Value Ref Range    EF 60 55 - 65    Mitral Valve Regurgitation MILD     Est. PA Systolic Pressure 22.89     Mitral Valve Mobility NORMAL     Tricuspid Valve Regurgitation TRIVIAL     and X-Ray: CXR: X-Ray Chest PA and Lateral (CXR): No results found for this visit on 04/26/18.

## 2018-04-28 NOTE — ASSESSMENT & PLAN NOTE
S/p CABG  Cont asa, plavix  Statin intolerant  Start BB if HR stable  Cont IS  Chest tube management per CTS

## 2018-04-28 NOTE — HPI
82F h/o CAD, T2DM, HLD, HTN and CVA presented with UA to cardiology clinic earlier this month.  Had LHC scheduled on 4/26 showing MVD.  Had CABG x 2 per CVT today.  Currently in ICU on Levophed and insulin gtt, mechanically ventilated.   Hospital medicine consulted for medical management.

## 2018-04-28 NOTE — PROGRESS NOTES
Report received and care assumed.  Patient arrived to unit from OR at 1923. Levo infusing and insulin drip at 7u/hr. Unresponsive at this time. CT x3, UOP WDL per catheter. Pacing wires x2 secured per protocol. CO/CI 3.4/1.8. Blood  sent to lab.

## 2018-04-28 NOTE — ASSESSMENT & PLAN NOTE
- hemoglobin a1c 9.6  - POC glucose >400  - was started on insulin gtt per primary  - will monitor accuchecks q1h, SSI PRN per protocol

## 2018-04-28 NOTE — PROGRESS NOTES
Attempt to extubate unsuccessful.  Unable to tolerate SPON vent setting for more than 90 seconds before increased work of breathing and increased anxiety noted, including flushing of face. Unable to lift head off bed upon command and unable to hold head against gravity. Able to squeeze hands and wiggle toes on command.  Continues to require pressors to maintain systolic < 120 and urinary output poor. SV 54 and SVR 1402.  Albumin 25 Gm given in attempt to maintain B/P without pressors and increase urinary output. Precedex continues at 0.2 mcg/kg/hr to maintain RASS of 0.

## 2018-04-28 NOTE — PROGRESS NOTES
[facility] -   Critical Care Medicine  Progress Note    Patient Name: Loida Holden  MRN: [id number]  Admission Date: 4/26/2018  Hospital Length of Stay: 2 days  Code Status: Full Code  Attending Provider: Robb Mukherjee MD  Primary Care Provider: YAKELIN Richardson MD   Principal Problem: CAD, multiple vessel    Subjective:     HPI:  82 year old female with extensive PMH including CAD with remote PCI along with 2012 [facility] eval with non obstructive dz; HTN; HLD; DM2; obesity; anxiety; CKD3; meningioma  Admitted on 4/26 for [facility] eval s/t intermittent CP with exertion and activity intolerance  [facility] revealed multi vessel CAD with nl EF    Hospital/ICU Course:  4/27 - transferred to ICU sedated post anesthesia with OETT on mechanical ventilation along with levophed support and insulin infusion  4/28 - extubated early this am, complains of SOB, uncontrolled incisional pain; levophed weaned, MAP marginal with analgesia      Objective:     Vital Signs (Most Recent):  Temp: 99 °F (37.2 °C) (04/28/18 0700)  Pulse: 66 (04/28/18 0800)  Resp: 17 (04/28/18 0800)  BP: (!) 110/43 (04/28/18 0800)  SpO2: (!) 92 % (04/28/18 0800) Vital Signs (24h Range):  Temp:  [98.1 °F (36.7 °C)-99.2 °F (37.3 °C)] 99 °F (37.2 °C)  Pulse:  [54-75] 66  Resp:  [12-29] 17  SpO2:  [91 %-100 %] 92 %  BP: ()/(43-99) 110/43     Weight: 92.4 kg (203 lb 11.3 oz)  Body mass index is 34.97 kg/m².      Intake/Output Summary (Last 24 hours) at 04/28/18 0919  Last data filed at 04/28/18 0800   Gross per 24 hour   Intake          7554.38 ml   Output             1779 ml   Net          5775.38 ml       Physical Exam   Constitutional: She is sleeping. She is easily aroused. She appears ill.   HENT:   Head: Atraumatic.   Eyes: Conjunctivae are normal.   Neck: No JVD present. No tracheal deviation present.   Cardiovascular: Normal rate and regular rhythm.  Exam reveals distant heart sounds.    Pulses:       Radial pulses are 2+ on the right  side, and 2+ on the left side.        Dorsalis pedis pulses are 2+ on the right side, and 2+ on the left side.   Pulmonary/Chest: No accessory muscle usage. No respiratory distress. She has decreased breath sounds. She has no wheezes. She has no rhonchi. She has no rales.   Shallow respiratory effort   Abdominal: Soft. She exhibits no distension. Bowel sounds are absent.   Musculoskeletal: She exhibits no edema.   Neurological: She is easily aroused.   Skin: Skin is warm and dry. Capillary refill takes less than 2 seconds.            Vents:  Vent Mode: Spont (04/28/18 0438)  Set Rate: 0 bmp (04/28/18 0438)  Vt Set: 450 mL (04/28/18 0438)  Pressure Support: 10 cmH20 (04/28/18 0438)  PEEP/CPAP: 5 cmH20 (04/28/18 0438)  Oxygen Concentration (%): 40 (04/28/18 0451)  Peak Airway Pressure: 16 cmH2O (04/28/18 0438)  Plateau Pressure: 0 cmH20 (04/28/18 0438)  Total Ve: 10.1 mL (04/28/18 0438)  Negative Inspiratory Force (cm H2O): -25 (04/28/18 0448)    Lines/Drains/Airways     Central Venous Catheter Line                 Introducer 04/27/18 1835 right internal jugular less than 1 day         Pulmonary Artery Catheter Assessment  04/27/18 1421 less than 1 day          Drain                 Closed/Suction Drain 04/27/18 1718 Left Leg Bulb 19 Fr. less than 1 day         Urethral Catheter 04/27/18 1340 Temperature probe 16 Fr. less than 1 day         Y Chest Tube 1 and 2 04/27/18 1704 1 Left Pleural 24 Fr. 2 Mediastinal 24 Fr. less than 1 day         Y Chest Tube 3 and 4 04/27/18 1705 3 Right Pleural 24 Fr. less than 1 day          Arterial Line                 Arterial Line 04/27/18  Left Radial 1 day          Line                 Pacer Wires 04/27/18 1724 less than 1 day          Peripheral Intravenous Line                 Peripheral IV - Single Lumen 04/26/18 0938 Left Wrist 1 day         Peripheral IV - Single Lumen 04/27/18 1325 Right Wrist less than 1 day                Significant Labs:    CBC/Anemia Profile:    Recent  Labs  Lab 04/27/18  0450 04/27/18  1329  04/27/18  1713 04/27/18  1839 04/28/18  0400   WBC 6.89  --   --   --  12.55 14.84*   HGB 11.8* 12.4  --   --  8.4* 10.3*   HCT 35.9* 37.2  < > 23* 24.6* 30.4*   * 113*  --   --  161 181   MCV 95  --   --   --  94 89   RDW 14.7*  --   --   --  15.1* 17.8*   < > = values in this interval not displayed.     Chemistries:    Recent Labs  Lab 04/27/18  0450 04/27/18  1903 04/28/18  0400    143 143   K 3.1* 3.6 3.3*    112* 111*   CO2 25 24 24   BUN 20 16 16   CREATININE 1.1 0.9 0.9   CALCIUM 8.5* 7.7* 7.9*   ALBUMIN 3.1*  --   --    PROT 5.8*  --   --    BILITOT 0.4  --   --    ALKPHOS 90  --   --    ALT 30  --   --    AST 33  --   --    MG  --  1.6 2.4   PHOS  --  2.4*  --        All pertinent labs within the past 24 hours have been reviewed.    Significant Imaging:  I have reviewed all pertinent imaging results/findings within the past 24 hours.    Assessment/Plan:     Pulmonary   Hypoxia    Likely atelectatic s/t shallow respiratory effort and pain  Supplemental oxygen to keep sat > 92  Encourage pulmonary toilet with TCDB, IS, and mobilization  Optimize pain control        Cardiac/Vascular   * CAD, multiple vessel    Now s/p CABG x 2   CVT and cardiology following  Continue ASA, plavix, metoprolol        Renal/   Stage 3 chronic kidney disease    Strict I/O  Creatinine holding post op; monitor        Endocrine   Type 2 diabetes mellitus with hyperglycemia, without long-term current use of insulin    Insulin infusion with hourly monitoring and adjustments for glucose control and prevention of insulin toxicity          Preventive Measures:   Nutrition: advance to cardiac diet  Stress Ulcer: pepcid  DVT: LMWH/SCDs  BB: hold metoprolol today  Bowel Prophylaxis: miralax  Head of Bed/Reposition: Elevate HOB and turn Q1-2 hours    Mobility: sternal precautions, OOB today  Arterial Line Day: 2  Live Day: 2  IVAB Day: 2 of 2 for post CTS prophylaxis  Code Status:  Full    Counseling/Consultation: I have discussed the care of this patient in detail with nursing staff and Dr. Montalvo. Status and plan of care discussed with team on multidisciplinary rounds.     Critical Care Time: 40 minutes   Critical care was time spent personally by me on the following activities: development of treatment plan with patient or surrogate and bedside caregivers, discussions with consultants, evaluation of patient's response to treatment, examination of patient, ordering and performing treatments and interventions, ordering and review of laboratory studies, ordering and review of radiographic studies, pulse oximetry, re-evaluation of patient's condition. This critical care time did not overlap with that of any other provider or involve time for any procedures.     Yessica Rutledge NP  Critical Care Medicine  [facility]

## 2018-04-28 NOTE — SUBJECTIVE & OBJECTIVE
Objective:     Vital Signs (Most Recent):  Temp: 99 °F (37.2 °C) (04/28/18 0700)  Pulse: 66 (04/28/18 0800)  Resp: 17 (04/28/18 0800)  BP: (!) 110/43 (04/28/18 0800)  SpO2: (!) 92 % (04/28/18 0800) Vital Signs (24h Range):  Temp:  [98.1 °F (36.7 °C)-99.2 °F (37.3 °C)] 99 °F (37.2 °C)  Pulse:  [54-75] 66  Resp:  [12-29] 17  SpO2:  [91 %-100 %] 92 %  BP: ()/(43-99) 110/43     Weight: 92.4 kg (203 lb 11.3 oz)  Body mass index is 34.97 kg/m².      Intake/Output Summary (Last 24 hours) at 04/28/18 0919  Last data filed at 04/28/18 0800   Gross per 24 hour   Intake          7554.38 ml   Output             1779 ml   Net          5775.38 ml       Physical Exam   Constitutional: She is sleeping. She is easily aroused. She appears ill.   HENT:   Head: Atraumatic.   Eyes: Conjunctivae are normal.   Neck: No JVD present. No tracheal deviation present.   Cardiovascular: Normal rate and regular rhythm.  Exam reveals distant heart sounds.    Pulses:       Radial pulses are 2+ on the right side, and 2+ on the left side.        Dorsalis pedis pulses are 2+ on the right side, and 2+ on the left side.   Pulmonary/Chest: No accessory muscle usage. No respiratory distress. She has decreased breath sounds. She has no wheezes. She has no rhonchi. She has no rales.   Shallow respiratory effort   Abdominal: Soft. She exhibits no distension. Bowel sounds are absent.   Musculoskeletal: She exhibits no edema.   Neurological: She is easily aroused.   Skin: Skin is warm and dry. Capillary refill takes less than 2 seconds.            Vents:  Vent Mode: Spont (04/28/18 0438)  Set Rate: 0 bmp (04/28/18 0438)  Vt Set: 450 mL (04/28/18 0438)  Pressure Support: 10 cmH20 (04/28/18 0438)  PEEP/CPAP: 5 cmH20 (04/28/18 0438)  Oxygen Concentration (%): 40 (04/28/18 0451)  Peak Airway Pressure: 16 cmH2O (04/28/18 0438)  Plateau Pressure: 0 cmH20 (04/28/18 0438)  Total Ve: 10.1 mL (04/28/18 0438)  Negative Inspiratory Force (cm H2O): -25 (04/28/18  0448)    Lines/Drains/Airways     Central Venous Catheter Line                 Introducer 04/27/18 1835 right internal jugular less than 1 day         Pulmonary Artery Catheter Assessment  04/27/18 1421 less than 1 day          Drain                 Closed/Suction Drain 04/27/18 1718 Left Leg Bulb 19 Fr. less than 1 day         Urethral Catheter 04/27/18 1340 Temperature probe 16 Fr. less than 1 day         Y Chest Tube 1 and 2 04/27/18 1704 1 Left Pleural 24 Fr. 2 Mediastinal 24 Fr. less than 1 day         Y Chest Tube 3 and 4 04/27/18 1705 3 Right Pleural 24 Fr. less than 1 day          Arterial Line                 Arterial Line 04/27/18  Left Radial 1 day          Line                 Pacer Wires 04/27/18 1724 less than 1 day          Peripheral Intravenous Line                 Peripheral IV - Single Lumen 04/26/18 0938 Left Wrist 1 day         Peripheral IV - Single Lumen 04/27/18 1325 Right Wrist less than 1 day                Significant Labs:    CBC/Anemia Profile:    Recent Labs  Lab 04/27/18  0450 04/27/18  1329  04/27/18  1713 04/27/18  1839 04/28/18  0400   WBC 6.89  --   --   --  12.55 14.84*   HGB 11.8* 12.4  --   --  8.4* 10.3*   HCT 35.9* 37.2  < > 23* 24.6* 30.4*   * 113*  --   --  161 181   MCV 95  --   --   --  94 89   RDW 14.7*  --   --   --  15.1* 17.8*   < > = values in this interval not displayed.     Chemistries:    Recent Labs  Lab 04/27/18  0450 04/27/18  1903 04/28/18  0400    143 143   K 3.1* 3.6 3.3*    112* 111*   CO2 25 24 24   BUN 20 16 16   CREATININE 1.1 0.9 0.9   CALCIUM 8.5* 7.7* 7.9*   ALBUMIN 3.1*  --   --    PROT 5.8*  --   --    BILITOT 0.4  --   --    ALKPHOS 90  --   --    ALT 30  --   --    AST 33  --   --    MG  --  1.6 2.4   PHOS  --  2.4*  --        All pertinent labs within the past 24 hours have been reviewed.    Significant Imaging:  I have reviewed all pertinent imaging results/findings within the past 24 hours.

## 2018-04-28 NOTE — PROGRESS NOTES
Extubated this am.    Levo off now    Patient having pain    Labs acceptable.    Vitals wnl    A/P    Remove swan  Keep a-line for BP monitoring  Cardiac meds  Hold metoprolol this am  Remain in ICU today

## 2018-04-28 NOTE — PROGRESS NOTES
Unable to tolerate spontaneous setting on ventilator. Immediately became very red in face, triggering vent alarms and hypertension noted.  Setting changed back to SIMV.

## 2018-04-28 NOTE — PROGRESS NOTES
Patient becomes extremely anxious and panicky when awake. Previously addressed with Fentanyl and Versed but beginning attempts to extubate patient.  Patient able to squeeze hands n command but unable to move toes or lift head at this time.  Face flushed and scared look on face, emotional support given.  Precedex started. Elevated B/P due to emotional state, Levo paused due to systolic at 150 but required to be resumed a lower rate to maintain systolic at > 130 (per MD directive). 1 u PRBC completed without incident. Will continue to attempt to extubate.

## 2018-04-28 NOTE — CONSULTS
[facility] Hospital Medicine  Consult Note    Patient Name: Loida Holden  MRN: [id number]  Admission Date: 4/26/2018  Hospital Length of Stay: 1 days  Attending Physician: Merritt Beltran MD  Primary Care Provider: YAKELIN Richardson MD           Patient information was obtained from ER records.     Consults  Subjective:     Principal Problem: On mechanically assisted ventilation    Chief Complaint: No chief complaint on file.       HPI: 82F h/o CAD, T2DM, HLD, HTN and CVA presented with UA to cardiology clinic earlier this month.  Had LHC scheduled on 4/26 showing MVD.  Had CABG x 2 per CVT today.  Currently in ICU on Levophed and insulin gtt, mechanically ventilated.   Hospital medicine consulted for medical management.     Past Medical History:   Diagnosis Date    Angina pectoris     Anxiety     Arthritis     neck, back,     Brain cyst     x2    Colon polyp     Coronary artery disease     s/p PTCA    Diabetes mellitus type 2 without retinopathy     Diabetes type 2, controlled 2/17/2016    Diverticulitis of intestine without hemorrhage 11/10/2015    Dry senile macular degeneration     Ex-smoker 11/10/2015    Fatty liver     Gastritis     Gout     Herpes genitalia     Hiatal hernia     History of shingles     Hx of colonic polyps     8/21/09    Hyperlipidemia     Hypertension     Hypothyroid     Meningioma     6/14 mri dr cuevas    Obesity (BMI 30.0-34.9) 12/2/2015    Osteopenia 4/15 klever 4/17    Pneumonia     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 hx of seizures    Stroke     Tobacco dependence     Type 2 diabetes mellitus with stage 3 chronic kidney disease, without long-term current use of insulin     Type 2 diabetes mellitus without complication        Past Surgical History:    Procedure Laterality Date    CARDIAC CATHETERIZATION      [employer] 2-MRI safe to 3T    CATARACT EXTRACTION W/  INTRAOCULAR LENS IMPLANT  OD w/YAG    CATARACT EXTRACTION W/  INTRAOCULAR LENS IMPLANT  OS w/YAG    CHOLECYSTECTOMY      2/2012    CORONARY ANGIOPLASTY      HYSTERECTOMY  1970s       Review of patient's allergies indicates:   Allergen Reactions    Codeine      Other reaction(s): Unknown    Diprivan [propofol]     Doxycycline      Other reaction(s): Unknown    Iodine and iodide containing products      Other reaction(s): Unknown    Ivp dye  [iodinated contrast- oral and iv dye]      Other reaction(s): Unknown    Latex, natural rubber Hives    Metronidazole hcl      Other reaction(s): Unknown  Other reaction(s): Unknown    Pantoprazole      Difficulty urinating      Procaine      Other reaction(s): Unknown  Other reaction(s): Unknown    Propofol analogues Other (See Comments)     Passes out     Rosuvastatin      Other reaction(s): liver enlargement  Other reaction(s): liver enlargement    Simvastatin      Other reaction(s): Unknown  Liver problems      Sulfasalazine Other (See Comments)       No current facility-administered medications on file prior to encounter.      Current Outpatient Prescriptions on File Prior to Encounter   Medication Sig    allopurinol (ZYLOPRIM) 100 MG tablet TAKE ONE TABLET BY MOUTH ONCE DAILY    betamethasone valerate 0.1% (VALISONE) 0.1 % Crea APPLY TOPICALLY TWO TIMES DAILY    BIOTIN ORAL Take 5,000 mcg by mouth once daily.    calcium carbonate (TUMS) 200 mg calcium (500 mg) chewable tablet Take 1 tablet by mouth once daily.    clopidogrel (PLAVIX) 75 mg tablet Take 1 tablet (75 mg total) by mouth once daily.    furosemide (LASIX) 40 MG tablet Take 1 tablet (40 mg total) by mouth 2 (two) times daily.    isosorbide mononitrate (IMDUR) 60 MG 24 hr tablet Take 1 tablet (60 mg total) by mouth once daily.    levothyroxine (SYNTHROID) 50 MCG tablet TAKE  ONE TABLET BY MOUTH ONCE DAILY BEFORE BREAKFAST    lisinopril 10 MG tablet Take 10 mg by mouth once daily.     MAG/ALUMINUM/SOD BICARB/ALGINC (GAVISCON ORAL) Take by mouth.    metoprolol tartrate (LOPRESSOR) 50 MG tablet Take 2 tablets (100 mg total) by mouth 2 (two) times daily.    pantoprazole (PROTONIX) 40 MG tablet     potassium chloride SA (K-DUR,KLOR-CON) 20 MEQ tablet Take 1 tablet (20 mEq total) by mouth 2 (two) times daily.    walker Misc Use rollator walker to reduce stress on knee    ACCU-CHEK NANNETTE PLUS METER [facility]     ACCU-CHEK SOFTCLIX LANCETS Misc     amoxicillin (AMOXIL) 875 MG tablet Take 875 mg by mouth 2 (two) times daily.    blood sugar diagnostic (TRUETEST TEST STRIPS) Strp 1 strip by Misc.(Non-Drug; Combo Route) route 3 (three) times daily. True test strips    lancets 33 gauge Misc 1 lancet by Misc.(Non-Drug; Combo Route) route 3 (three) times daily.     Family History     Problem Relation (Age of Onset)    Cancer Mother    Diabetes Maternal Grandfather    Heart disease Mother, Son    Liver disease Mother    Psoriasis Maternal Grandmother        Social History Main Topics    Smoking status: Former Smoker     Packs/day: 0.50     Years: 30.00     Quit date: 1/1/2002    Smokeless tobacco: Never Used    Alcohol use No    Drug use: No    Sexual activity: Not Currently     Review of Systems   Unable to perform ROS: Intubated     Objective:     Vital Signs (Most Recent):  Temp: 98.6 °F (37 °C) (04/27/18 1845)  Pulse: 69 (04/27/18 1928)  Resp: 14 (04/27/18 1845)  BP: (!) 194/81 (04/27/18 1204)  SpO2: 99 % (04/27/18 1928) Vital Signs (24h Range):  Temp:  [97.7 °F (36.5 °C)-98.6 °F (37 °C)] 98.6 °F (37 °C)  Pulse:  [54-71] 69  Resp:  [12-18] 14  SpO2:  [91 %-99 %] 99 %  BP: (135-194)/(60-81) 194/81     Weight: 83.8 kg (184 lb 11.9 oz)  Body mass index is 31.71 kg/m².    Physical Exam   Constitutional: She appears well-developed and well-nourished. No distress.   On mechanical ventilation  and sedation: GCS 3T  ROBERTO CARLOS drain - LLE, serosainguinous fluid  L chest tube - serosainguinous fluid  Midsternal incision - dressing c/d/i   HENT:   Head: Normocephalic and atraumatic.   Eyes: Conjunctivae and EOM are normal. Pupils are equal, round, and reactive to light. No scleral icterus.   Neck: Normal range of motion. Neck supple. No JVD present. No thyromegaly present.   Cardiovascular: Normal rate, regular rhythm and intact distal pulses.  Exam reveals no gallop and no friction rub.    No murmur heard.  Pulmonary/Chest: Effort normal. No respiratory distress. She has no wheezes. She has no rales. She exhibits no tenderness.   Coarse breath sounds bilaterally   Abdominal: Soft. Bowel sounds are normal. She exhibits no distension and no mass. There is no tenderness. There is no guarding.   Musculoskeletal: Normal range of motion. She exhibits no tenderness.   Neurological: No cranial nerve deficit.   Skin: Skin is warm and dry. Capillary refill takes 2 to 3 seconds. No rash noted. She is not diaphoretic. No erythema.   Nursing note and vitals reviewed.      Significant Labs:   A1C:   Recent Labs  Lab 04/26/18  0935   HGBA1C 9.6*     BMP:   Recent Labs  Lab 04/27/18  1903   *      K 3.6   *   CO2 24   BUN 16   CREATININE 0.9   CALCIUM 7.7*   MG 1.6     CBC:   Recent Labs  Lab 04/26/18  0935 04/27/18  0450 04/27/18  1329  04/27/18  1654 04/27/18  1713 04/27/18  1839   WBC 9.39 6.89  --   --   --   --  12.55   HGB 14.4 11.8* 12.4  --   --   --  8.4*   HCT 42.2 35.9* 37.2  < > 26* 23* 24.6*    114* 113*  --   --   --  161   < > = values in this interval not displayed.  CMP:   Recent Labs  Lab 04/26/18  0935 04/27/18  0450 04/27/18  1903   * 141 143   K 3.8 3.1* 3.6    109 112*   CO2 23 25 24   * 249* 193*   BUN 24* 20 16   CREATININE 1.5* 1.1 0.9   CALCIUM 9.5 8.5* 7.7*   PROT  --  5.8*  --    ALBUMIN  --  3.1*  --    BILITOT  --  0.4  --    ALKPHOS  --  90  --    AST  --   33  --    ALT  --  30  --    ANIONGAP 12 7* 7*   EGFRNONAA 32* 47* 60     Magnesium:   Recent Labs  Lab 04/27/18  1903   MG 1.6     TSH: No results for input(s): TSH in the last 4320 hours.  Urine Studies: No results for input(s): COLORU, APPEARANCEUA, PHUR, SPECGRAV, PROTEINUA, GLUCUA, KETONESU, BILIRUBINUA, OCCULTUA, NITRITE, UROBILINOGEN, LEUKOCYTESUR, RBCUA, WBCUA, BACTERIA, SQUAMEPITHEL, HYALINECASTS in the last 48 hours.    Invalid input(s): WRIGHTSUR  All pertinent labs within the past 24 hours have been reviewed.    Significant Imaging: I have reviewed all pertinent imaging results/findings within the past 24 hours.   Imaging Results    None           Assessment/Plan:     * On mechanically assisted ventilation    - s/p CABG, chest tubes still draining  - management per ICU/pulm          Stage 3 chronic kidney disease    - stable  - monitor I/O  - replete electrolytes prn          CAD, multiple vessel    - s/p CABG x 2 POD#0  - ROBERTO CARLOS drain and chest tube draining well  - still on mechanical ventilation, pressor support  - post op management per CVT          Type 2 diabetes mellitus with stage 3 chronic kidney disease, without long-term current use of insulin    - hemoglobin a1c 9.6  - POC glucose >400  - was started on insulin gtt per primary  - will monitor accuchecks q1h, SSI PRN per protocol            VTE Risk Mitigation         Ordered     IP VTE HIGH RISK PATIENT  Once      04/26/18 1446          Critical care time spent on the evaluation and treatment of severe organ dysfunction, review of pertinent labs and imaging studies, discussions with consulting providers and discussions with patient/family: 30 minutes.    Thank you for your consult. I will follow-up with patient. Please contact us if you have any additional questions.    Merritt Beltran MD  Department of Hospital Medicine   [facility]

## 2018-04-28 NOTE — PLAN OF CARE
Problem: Patient Care Overview  Goal: Plan of Care Review  Outcome: Ongoing (interventions implemented as appropriate)  Patient is S/P CAB x2 on pump. Extubated at 0449 after multiple failed attempts. Requiring frequent re-instruction on sternal precautions and needs frequent reassurances. Pain controlled with Fentanyl IVP until evaluated by MD due to codeine allergy. Takes shallow breaths and at times will hold breath, re-instructed on need to take large breaths. NSR with first degree AV block on scope. Requiring Levophed to maintain systolic pressure 120-130, attempts to wean off unsuccessful. Insulin infusion suspended due to low CBGs, hourly checks continue. Potassium and Magnesium replaced. Received 1 u PRBC. Chest tubes x3 with sanginous drainage. Pacing wires x2 secured to chest. UOP low, MD aware. Received 1 liter LR and 250ml albumin this shift in attempt to increase UOP and control B/P in order to wean Levophed. Bath and linens changed after patient extubated. Safety maintained.

## 2018-04-28 NOTE — ASSESSMENT & PLAN NOTE
- s/p CABG x 2 POD#0  - ROBERTO CARLOS drain and chest tube draining well  - still on mechanical ventilation, pressor support  - post op management per CVT

## 2018-04-28 NOTE — PLAN OF CARE
Problem: Patient Care Overview  Goal: Plan of Care Review  Patient currently requires max assist x 2 for bed mobility and transfers, as well as frequent cueing to follow sternal precautions.   Outcome: Ongoing (interventions implemented as appropriate)  Pt c/o chest pain and tightness through out the day, pain meds given with good relief.  Pt sits up in the chair several hours today, needs 2 max assist to get to chair.  No ectopy noted today.  Pt has poor appetite, eats approximately 25% of meals.  Pt is slightly confused at times, reorients easily.  Son at bedside this afternoon.  POC discussed at bedside.

## 2018-04-28 NOTE — PT/OT/SLP EVAL
"Physical Therapy Evaluation    Patient Name:  Loida Holden   MRN:  [id number]    Recommendations:     Discharge Recommendations:  nursing facility, skilled   Discharge Equipment Recommendations: bath bench   Barriers to discharge: Decreased caregiver support    Assessment:     Loida Holden is a 82 y.o. female admitted with a medical diagnosis of CAD, multiple vessel.  She presents with the following impairments/functional limitations:  weakness, impaired endurance, impaired self care skills, impaired functional mobilty, gait instability, impaired balance, decreased coordination, decreased upper extremity function, decreased safety awareness, pain, impaired cardiopulmonary response to activity, other (comment) (sternal precautions) .    Rehab Prognosis:  FAIR/GOOD; patient would benefit from acute skilled PT services to address these deficits and reach maximum level of function.      Recent Surgery: Procedure(s) (LRB):  AORTOCORONARY [id number]-GALILEO-OP (N/A) 1 Day Post-Op    Plan:     During this hospitalization, patient to be seen 5 x/week to address the above listed problems via gait training, therapeutic activities, therapeutic exercises  · Plan of Care Expires:  05/05/18   Plan of Care Reviewed with: patient, son    Subjective     Communicated with [facility] prior to session.  Patient found supine in bed upon PT entry to room, agreeable to evaluation.      Chief Complaint: Discomfort  Patient comments/goals: None voiced; "Can you make me feel better?"  Pain/Comfort:  · Pain Rating 1: 5/10  · Location 1: sternal  · Pain Addressed 1: Reposition, Distraction, Pre-medicate for activity    Patients cultural, spiritual, Roman [employer] conflicts given the current situation:  none    Living Environment:  Patient lives home alone in a one-story home with 4 stairs with a railing to enter.  Prior to admission, patients level of function was modified indep with amb (Rollator or straight cane) and " indep with ADLs.  Patient's son stated that she was very active, taking care of her garden.  Patient has the following equipment: rollator, cane, straight.  DME owned (not currently used): none.  Upon discharge, patient will have assistance from ???.    Objective:     Patient found with: central line, blood pressure cuff, krishnamurthy catheter, ROBERTO CARLOS drain, oxygen, pulse ox (continuous), telemetry, chest tube     General Precautions: Standard, fall, sternal   Orthopedic Precautions:N/A   Braces: N/A     Exams:  · Cognitive Exam:  Patient is oriented to Person and Situation and follows 50% of one-step commands   · Gross Motor Coordination:  Decreased  · Postural Exam:  Patient presented with the following abnormalities:    · -       Rounded shoulders  · -       Forward head  · Sensation:    · -       Intact  · RLE ROM: WFL  · RLE Strength: Functionally 4-/5  · LLE ROM: WFL  · LLE Strength: Functionally 4-/5    Functional Mobility:  · Bed Mobility:     · Scooting: total assistance and of 2 persons  · Supine to Sit: total assistance and of 2 persons  · Transfers:     · Sit to Stand:  maximal assistance and of 2 persons with no AD  · Bed to Chair: maximal assistance and of 2 persons with  no AD  using  Stand Pivot  · Gait: unable to progress to amb at this time  · Balance: Poor sitting balance; Poor standing balance    AM-PAC 6 CLICK MOBILITY  Total Score:10       Therapeutic Activities and Exercises:   Educated patient and her son on sternal precautions; handout left in room.  Patient had difficulty following commands and adhering to sternal precautions.  Frequent cueing required throughout session.  While seated edge of bed, patient with poor sitting balance, kept closing eyes and attempting to use UEs to grab people/objects for balance.      Patient left up in chair with all lines intact, call button in reach and nurse Silvana notified.    GOALS:    Physical Therapy Goals        Problem: Physical Therapy Goal    Goal Priority  Disciplines Outcome Goal Variances Interventions   Physical Therapy Goal     PT/OT, PT      Description:  LTGs to be met within 7 days (5/5/18):  1.  Patient will perform bed mobility with mod assist  2.  Patient will perform functional transfers with mod assist  3.  Patient will ambulate 100' without AD with min assist and no gross LOB  4.  Patient will verbalize all sternal precautions with 100% accuracy                      History:     Past Medical History:   Diagnosis Date    Angina pectoris     Anxiety     Arthritis     neck, back,     Brain cyst     x2    Colon polyp     Coronary artery disease     s/p PTCA    Diabetes mellitus type 2 without retinopathy     Diabetes type 2, controlled 2/17/2016    Diverticulitis of intestine without hemorrhage 11/10/2015    Dry senile macular degeneration     Ex-smoker 11/10/2015    Fatty liver     Gastritis     Gout     Herpes genitalia     Hiatal hernia     History of shingles     Hx of colonic polyps     8/21/09    Hyperlipidemia     Hypertension     Hypothyroid     Meningioma     6/14 mri dr cuevas    Obesity (BMI 30.0-34.9) 12/2/2015    Osteopenia 4/15 klever 4/17    Pneumonia     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 hx of seizures    Stroke     Tobacco dependence     Type 2 diabetes mellitus with stage 3 chronic kidney disease, without long-term current use of insulin     Type 2 diabetes mellitus without complication        Past Surgical History:   Procedure Laterality Date    CARDIAC CATHETERIZATION      [facility] 2-MRI safe to 3T    CATARACT EXTRACTION W/  INTRAOCULAR LENS IMPLANT  OD w/YAG    CATARACT EXTRACTION W/  INTRAOCULAR LENS IMPLANT  OS w/YAG    CHOLECYSTECTOMY      2/2012    CORONARY ANGIOPLASTY      HYSTERECTOMY  1970s       Clinical  Decision Making:     History  Co-morbidities and personal factors that may impact the plan of care Examination  Body Structures and Functions, activity limitations and participation restrictions that may impact the plan of care Clinical Presentation   Decision Making/ Complexity Score   Co-morbidities:   [] Time since onset of injury / illness / exacerbation  [] Status of current condition  []Patient's cognitive status and safety concerns    [] Multiple Medical Problems (see med hx)  Personal Factors:   [] Patient's age  [] Prior Level of function   [] Patient's home situation (environment and family support)  [] Patient's level of motivation  [] Expected progression of patient      HISTORY:(criteria)    [] 70359 - no personal factors/history    [] 74997 - has 1-2 personal factor/comorbidity     [] 67629 - has >3 personal factor/comorbidity     Body Regions:  [] Objective examination findings  [] Head     []  Neck  [] Trunk   [] Upper Extremity  [] Lower Extremity    Body Systems:  [] For communication ability, affect, cognition, language, and learning style: the assessment of the ability to make needs known, consciousness, orientation (person, place, and time), expected emotional /behavioral responses, and learning preferences (eg, learning barriers, education  needs)  [] For the neuromuscular system: a general assessment of gross coordinated movement (eg, balance, gait, locomotion, transfers, and transitions) and motor function  (motor control and motor learning)  [] For the musculoskeletal system: the assessment of gross symmetry, gross range of motion, gross strength, height, and weight  [] For the integumentary system: the assessment of pliability(texture), presence of scar formation, skin color, and skin integrity  [] For cardiovascular/pulmonary system: the assessment of heart rate, respiratory rate, blood pressure, and edema     Activity limitations:    [] Patient's cognitive status and saf ety concerns           [] Status of current condition      [] Weight bearing restriction  [] Cardiopulmunary Restriction    Participation Restrictions:   [] Goals and goal agreement with the patient     [] Rehab potential (prognosis) and probable outcome      Examination of Body System: (criteria)    [] 47038 - addressing 1-2 elements    [] 53161 - addressing a total of 3 or more elements     [] 72449 -  Addressing a total of 4 or more elements         Clinical Presentation: (criteria)  Choose one     On examination of body system using standardized tests and measures patient presents with (CHOOSE ONE) elements from any of the following: body structures and functions, activity limitations, and/or participation restrictions.  Leading to a clinical presentation that is considered (CHOOSE ONE)                              Clinical Decision Making  (Eval Complexity):  Choose One     Time Tracking:     PT Received On: 04/28/18  PT Start Time: 1035     PT Stop Time: 1058  PT Total Time (min): 23 min     Billable Minutes: Evaluation 15 mins and Therapeutic Activity 8 mins      Heydi Rodriguez, PT, DPT  04/28/2018

## 2018-04-28 NOTE — ASSESSMENT & PLAN NOTE
Likely atelectatic s/t shallow respiratory effort and pain  Supplemental oxygen to keep sat > 92  Encourage pulmonary toilet with TCDB, IS, and mobilization  Optimize pain control

## 2018-04-29 PROBLEM — Z95.1 S/P CABG (CORONARY ARTERY BYPASS GRAFT): Status: ACTIVE | Noted: 2018-04-29

## 2018-04-29 LAB
ANION GAP SERPL CALC-SCNC: 7 MMOL/L
BASOPHILS # BLD AUTO: 0.01 K/UL
BASOPHILS NFR BLD: 0.1 %
BLD PROD TYP BPU: NORMAL
BLOOD UNIT EXPIRATION DATE: NORMAL
BLOOD UNIT TYPE CODE: 6200
BLOOD UNIT TYPE: NORMAL
BUN SERPL-MCNC: 18 MG/DL
CALCIUM SERPL-MCNC: 8.2 MG/DL
CHLORIDE SERPL-SCNC: 111 MMOL/L
CO2 SERPL-SCNC: 23 MMOL/L
CODING SYSTEM: NORMAL
CREAT SERPL-MCNC: 1.1 MG/DL
DIFFERENTIAL METHOD: ABNORMAL
DISPENSE STATUS: NORMAL
EOSINOPHIL # BLD AUTO: 0.1 K/UL
EOSINOPHIL NFR BLD: 0.5 %
ERYTHROCYTE [DISTWIDTH] IN BLOOD BY AUTOMATED COUNT: 18.3 %
EST. GFR  (AFRICAN AMERICAN): 54 ML/MIN/1.73 M^2
EST. GFR  (NON AFRICAN AMERICAN): 47 ML/MIN/1.73 M^2
GLUCOSE SERPL-MCNC: 122 MG/DL
HCT VFR BLD AUTO: 29.5 %
HGB BLD-MCNC: 9.8 G/DL
LYMPHOCYTES # BLD AUTO: 1.3 K/UL
LYMPHOCYTES NFR BLD: 11.9 %
MCH RBC QN AUTO: 30 PG
MCHC RBC AUTO-ENTMCNC: 33.2 G/DL
MCV RBC AUTO: 90 FL
MONOCYTES # BLD AUTO: 1.5 K/UL
MONOCYTES NFR BLD: 13.3 %
NEUTROPHILS # BLD AUTO: 8.3 K/UL
NEUTROPHILS NFR BLD: 74.2 %
PLATELET # BLD AUTO: 121 K/UL
PMV BLD AUTO: 9.8 FL
POCT GLUCOSE: 111 MG/DL (ref 70–110)
POCT GLUCOSE: 118 MG/DL (ref 70–110)
POCT GLUCOSE: 118 MG/DL (ref 70–110)
POCT GLUCOSE: 120 MG/DL (ref 70–110)
POCT GLUCOSE: 126 MG/DL (ref 70–110)
POCT GLUCOSE: 128 MG/DL (ref 70–110)
POCT GLUCOSE: 129 MG/DL (ref 70–110)
POCT GLUCOSE: 136 MG/DL (ref 70–110)
POCT GLUCOSE: 206 MG/DL (ref 70–110)
POCT GLUCOSE: 250 MG/DL (ref 70–110)
POCT GLUCOSE: 261 MG/DL (ref 70–110)
POTASSIUM SERPL-SCNC: 4 MMOL/L
RBC # BLD AUTO: 3.27 M/UL
SODIUM SERPL-SCNC: 141 MMOL/L
TRANS PLATPHERESIS VOL PATIENT: NORMAL ML
WBC # BLD AUTO: 11.22 K/UL

## 2018-04-29 PROCEDURE — 27000221 HC OXYGEN, UP TO 24 HOURS

## 2018-04-29 PROCEDURE — 99291 CRITICAL CARE FIRST HOUR: CPT | Mod: ,,, | Performed by: INTERNAL MEDICINE

## 2018-04-29 PROCEDURE — 80048 BASIC METABOLIC PNL TOTAL CA: CPT

## 2018-04-29 PROCEDURE — 21400001 HC TELEMETRY ROOM

## 2018-04-29 PROCEDURE — 99233 SBSQ HOSP IP/OBS HIGH 50: CPT | Mod: ,,, | Performed by: INTERNAL MEDICINE

## 2018-04-29 PROCEDURE — 63600175 PHARM REV CODE 636 W HCPCS: Performed by: PHYSICIAN ASSISTANT

## 2018-04-29 PROCEDURE — 63600175 PHARM REV CODE 636 W HCPCS: Performed by: FAMILY MEDICINE

## 2018-04-29 PROCEDURE — 63600175 PHARM REV CODE 636 W HCPCS: Performed by: THORACIC SURGERY (CARDIOTHORACIC VASCULAR SURGERY)

## 2018-04-29 PROCEDURE — 94799 UNLISTED PULMONARY SVC/PX: CPT

## 2018-04-29 PROCEDURE — 25000003 PHARM REV CODE 250: Performed by: THORACIC SURGERY (CARDIOTHORACIC VASCULAR SURGERY)

## 2018-04-29 PROCEDURE — 85025 COMPLETE CBC W/AUTO DIFF WBC: CPT

## 2018-04-29 PROCEDURE — 25000003 PHARM REV CODE 250: Performed by: PHYSICIAN ASSISTANT

## 2018-04-29 PROCEDURE — S0028 INJECTION, FAMOTIDINE, 20 MG: HCPCS | Performed by: PHYSICIAN ASSISTANT

## 2018-04-29 PROCEDURE — 63600175 PHARM REV CODE 636 W HCPCS: Performed by: NURSE PRACTITIONER

## 2018-04-29 PROCEDURE — 97116 GAIT TRAINING THERAPY: CPT

## 2018-04-29 RX ORDER — LEVOTHYROXINE SODIUM 50 UG/1
50 TABLET ORAL
Status: DISCONTINUED | OUTPATIENT
Start: 2018-04-29 | End: 2018-05-02 | Stop reason: HOSPADM

## 2018-04-29 RX ORDER — IBUPROFEN 200 MG
16 TABLET ORAL
Status: DISCONTINUED | OUTPATIENT
Start: 2018-04-29 | End: 2018-05-02 | Stop reason: HOSPADM

## 2018-04-29 RX ORDER — FUROSEMIDE 10 MG/ML
20 INJECTION INTRAMUSCULAR; INTRAVENOUS 2 TIMES DAILY
Status: COMPLETED | OUTPATIENT
Start: 2018-04-29 | End: 2018-04-30

## 2018-04-29 RX ORDER — INSULIN ASPART 100 [IU]/ML
1-10 INJECTION, SOLUTION INTRAVENOUS; SUBCUTANEOUS
Status: DISCONTINUED | OUTPATIENT
Start: 2018-04-29 | End: 2018-05-02 | Stop reason: HOSPADM

## 2018-04-29 RX ORDER — METOPROLOL TARTRATE 50 MG/1
50 TABLET ORAL 2 TIMES DAILY
Status: DISCONTINUED | OUTPATIENT
Start: 2018-04-29 | End: 2018-05-02 | Stop reason: HOSPADM

## 2018-04-29 RX ORDER — IBUPROFEN 200 MG
24 TABLET ORAL
Status: DISCONTINUED | OUTPATIENT
Start: 2018-04-29 | End: 2018-05-02 | Stop reason: HOSPADM

## 2018-04-29 RX ADMIN — METOPROLOL TARTRATE 50 MG: 50 TABLET ORAL at 08:04

## 2018-04-29 RX ADMIN — FUROSEMIDE 20 MG: 10 INJECTION, SOLUTION INTRAVENOUS at 08:04

## 2018-04-29 RX ADMIN — DOCUSATE SODIUM 100 MG: 100 CAPSULE, LIQUID FILLED ORAL at 08:04

## 2018-04-29 RX ADMIN — ASPIRIN 81 MG: 81 TABLET, COATED ORAL at 08:04

## 2018-04-29 RX ADMIN — INSULIN ASPART 4 UNITS: 100 INJECTION, SOLUTION INTRAVENOUS; SUBCUTANEOUS at 05:04

## 2018-04-29 RX ADMIN — Medication 0.5 MG: at 03:04

## 2018-04-29 RX ADMIN — OXYCODONE HYDROCHLORIDE 5 MG: 5 TABLET ORAL at 12:04

## 2018-04-29 RX ADMIN — ACETAMINOPHEN 650 MG: 325 TABLET, FILM COATED ORAL at 05:04

## 2018-04-29 RX ADMIN — INSULIN ASPART 2 UNITS: 100 INJECTION, SOLUTION INTRAVENOUS; SUBCUTANEOUS at 02:04

## 2018-04-29 RX ADMIN — Medication 0.5 MG: at 06:04

## 2018-04-29 RX ADMIN — ACETAMINOPHEN 650 MG: 325 TABLET, FILM COATED ORAL at 01:04

## 2018-04-29 RX ADMIN — OXYCODONE HYDROCHLORIDE 5 MG: 5 TABLET ORAL at 07:04

## 2018-04-29 RX ADMIN — INSULIN DETEMIR 20 UNITS: 100 INJECTION, SOLUTION SUBCUTANEOUS at 08:04

## 2018-04-29 RX ADMIN — FAMOTIDINE 20 MG: 10 INJECTION INTRAVENOUS at 08:04

## 2018-04-29 RX ADMIN — LEVOTHYROXINE SODIUM 50 MCG: 50 TABLET ORAL at 08:04

## 2018-04-29 RX ADMIN — INSULIN ASPART 2 UNITS: 100 INJECTION, SOLUTION INTRAVENOUS; SUBCUTANEOUS at 09:04

## 2018-04-29 RX ADMIN — CHLORHEXIDINE GLUCONATE 10 ML: 1.2 RINSE ORAL at 08:04

## 2018-04-29 RX ADMIN — POTASSIUM CHLORIDE 20 MEQ: 200 INJECTION, SOLUTION INTRAVENOUS at 05:04

## 2018-04-29 RX ADMIN — ACETAMINOPHEN 650 MG: 325 TABLET, FILM COATED ORAL at 12:04

## 2018-04-29 RX ADMIN — POLYETHYLENE GLYCOL 3350 17 G: 17 POWDER, FOR SOLUTION ORAL at 08:04

## 2018-04-29 RX ADMIN — CLOPIDOGREL BISULFATE 75 MG: 75 TABLET ORAL at 08:04

## 2018-04-29 RX ADMIN — ENOXAPARIN SODIUM 40 MG: 100 INJECTION SUBCUTANEOUS at 04:04

## 2018-04-29 NOTE — PROGRESS NOTES
"RT entered room for IS instruction. Pt confused to person place and time. 98%. NC 4. Eda RN called to bedside and made aware. RT also notes pt tongue all over " black an blue" rn also aware.   "

## 2018-04-29 NOTE — PLAN OF CARE
Problem: Patient Care Overview  Goal: Plan of Care Review  Patient currently requires max assist x 2 for bed mobility and transfers, as well as frequent cueing to follow sternal precautions.   Outcome: Ongoing (interventions implemented as appropriate)  Pt on o2 tolerating well. Confusion noted with IS occurances. RN made aware.

## 2018-04-29 NOTE — SUBJECTIVE & OBJECTIVE
Review of Systems   Constitutional: Positive for malaise/fatigue. Negative for chills and fever.   HENT: Negative for sore throat.    Respiratory: Positive for cough. Negative for shortness of breath.    Cardiovascular: Positive for chest pain (surgical). Negative for palpitations and orthopnea.   Gastrointestinal: Negative for abdominal pain, nausea and vomiting.   Musculoskeletal: Positive for myalgias.   Skin: Negative for itching.   Neurological: Negative for focal weakness and seizures.   Psychiatric/Behavioral: The patient is not nervous/anxious.        Objective:     Vital Signs (Most Recent):  Temp: 99.8 °F (37.7 °C) (04/29/18 0705)  Pulse: 87 (04/29/18 0800)  Resp: 17 (04/29/18 0800)  BP: (!) 132/55 (04/29/18 0800)  SpO2: 97 % (04/29/18 0800) Vital Signs (24h Range):  Temp:  [98.5 °F (36.9 °C)-99.8 °F (37.7 °C)] 99.8 °F (37.7 °C)  Pulse:  [70-91] 87  Resp:  [15-22] 17  SpO2:  [93 %-97 %] 97 %  BP: ()/() 132/55     Weight: 91.6 kg (201 lb 15.1 oz)  Body mass index is 34.66 kg/m².      Intake/Output Summary (Last 24 hours) at 04/29/18 0907  Last data filed at 04/29/18 0705   Gross per 24 hour   Intake          1301.96 ml   Output             1558 ml   Net          -256.04 ml       Physical Exam   Constitutional: She appears ill. Nasal cannula in place.   HENT:   Head: Atraumatic.   Eyes: Conjunctivae are normal.   Neck: No JVD present. No tracheal deviation present.   Cardiovascular: Normal rate and regular rhythm.  Exam reveals distant heart sounds.    Pulses:       Radial pulses are 2+ on the right side, and 2+ on the left side.        Dorsalis pedis pulses are 2+ on the right side, and 2+ on the left side.   Pulmonary/Chest: No accessory muscle usage. No respiratory distress. She has decreased breath sounds. She has no wheezes. She has no rhonchi. She has no rales.   Abdominal: Soft. Bowel sounds are normal. She exhibits no distension.   Musculoskeletal: She exhibits no edema.   Neurological:  She is alert.   Skin: Skin is warm and dry. Capillary refill takes less than 2 seconds.        Psychiatric: She has a normal mood and affect. Her speech is normal. Thought content normal.       Vents:4L NC    Lines/Drains/Airways     Drain                 Urethral Catheter 04/27/18 1340 Temperature probe 16 Fr. 1 day         Y Chest Tube 3 and 4 04/27/18 1705 3 Right Pleural 24 Fr. 1 day          Peripheral Intravenous Line                 Peripheral IV - Single Lumen 04/26/18 0938 Left Wrist 2 days         Peripheral IV - Single Lumen 04/27/18 1325 Right Wrist 1 day                Significant Labs:    CBC/Anemia Profile:    Recent Labs  Lab 04/27/18  1839 04/28/18  0400 04/29/18  0354   WBC 12.55 14.84* 11.22   HGB 8.4* 10.3* 9.8*   HCT 24.6* 30.4* 29.5*    181 121*   MCV 94 89 90   RDW 15.1* 17.8* 18.3*        Chemistries:    Recent Labs  Lab 04/27/18  1903 04/28/18  0400 04/29/18  0354    143 141   K 3.6 3.3* 4.0   * 111* 111*   CO2 24 24 23   BUN 16 16 18   CREATININE 0.9 0.9 1.1   CALCIUM 7.7* 7.9* 8.2*   MG 1.6 2.4  --    PHOS 2.4*  --   --        All pertinent labs within the past 24 hours have been reviewed.    Significant Imaging:  I have reviewed all pertinent imaging results/findings within the past 24 hours.

## 2018-04-29 NOTE — PROGRESS NOTES
No issues    Pain improved    Vitals wnl    Labs acceptable    A/P    Start b-blocker  Diuresis  Remove tubes and wires  Transfer to tele  Ambulate

## 2018-04-29 NOTE — PROGRESS NOTES
"[facility] -   Critical Care Medicine  Progress Note    Patient Name: Loida Holden  MRN: [id number]  Admission Date: 4/26/2018  Hospital Length of Stay: 3 days  Code Status: Full Code  Attending Provider: Robb Mukherjee MD  Primary Care Provider: YAKELIN Richardson MD   Principal Problem: CAD, multiple vessel    Subjective:     HPI:  82 year old female with extensive PMH including CAD with remote PCI along with 2012 [facility] eval with non obstructive dz; HTN; HLD; DM2; obesity; anxiety; CKD3; meningioma  Admitted on 4/26 for [facility] eval s/t intermittent CP with exertion and activity intolerance  [facility] revealed multi vessel CAD with nl EF    Hospital/ICU Course:  4/27 - transferred to ICU sedated post anesthesia with OETT on mechanical ventilation along with levophed support and insulin infusion  4/28 - extubated early this am, complains of SOB, uncontrolled incisional pain; levophed weaned, MAP marginal with analgesia  4/28 - remained on insulin infusion overnight; up to chair this morning and reports pain better controlled today, feeling "better"; lines/tubes removed by CVT this am    Review of Systems   Constitutional: Positive for malaise/fatigue. Negative for chills and fever.   HENT: Negative for sore throat.    Respiratory: Positive for cough. Negative for shortness of breath.    Cardiovascular: Positive for chest pain (surgical). Negative for palpitations and orthopnea.   Gastrointestinal: Negative for abdominal pain, nausea and vomiting.   Musculoskeletal: Positive for myalgias.   Skin: Negative for itching.   Neurological: Negative for focal weakness and seizures.   Psychiatric/Behavioral: The patient is not nervous/anxious.        Objective:     Vital Signs (Most Recent):  Temp: 99.8 °F (37.7 °C) (04/29/18 0705)  Pulse: 87 (04/29/18 0800)  Resp: 17 (04/29/18 0800)  BP: (!) 132/55 (04/29/18 0800)  SpO2: 97 % (04/29/18 0800) Vital Signs (24h Range):  Temp:  [98.5 °F (36.9 °C)-99.8 °F (37.7 °C)] " 99.8 °F (37.7 °C)  Pulse:  [70-91] 87  Resp:  [15-22] 17  SpO2:  [93 %-97 %] 97 %  BP: ()/() 132/55     Weight: 91.6 kg (201 lb 15.1 oz)  Body mass index is 34.66 kg/m².      Intake/Output Summary (Last 24 hours) at 04/29/18 0907  Last data filed at 04/29/18 0705   Gross per 24 hour   Intake          1301.96 ml   Output             1558 ml   Net          -256.04 ml       Physical Exam   Constitutional: She appears ill. Nasal cannula in place.   HENT:   Head: Atraumatic.   Eyes: Conjunctivae are normal.   Neck: No JVD present. No tracheal deviation present.   Cardiovascular: Normal rate and regular rhythm.  Exam reveals distant heart sounds.    Pulses:       Radial pulses are 2+ on the right side, and 2+ on the left side.        Dorsalis pedis pulses are 2+ on the right side, and 2+ on the left side.   Pulmonary/Chest: No accessory muscle usage. No respiratory distress. She has decreased breath sounds. She has no wheezes. She has no rhonchi. She has no rales.   Abdominal: Soft. Bowel sounds are normal. She exhibits no distension.   Musculoskeletal: She exhibits no edema.   Neurological: She is alert.   Skin: Skin is warm and dry. Capillary refill takes less than 2 seconds.        Psychiatric: She has a normal mood and affect. Her speech is normal. Thought content normal.       Vents:4L NC    Lines/Drains/Airways     Drain                 Urethral Catheter 04/27/18 1340 Temperature probe 16 Fr. 1 day         Y Chest Tube 3 and 4 04/27/18 1705 3 Right Pleural 24 Fr. 1 day          Peripheral Intravenous Line                 Peripheral IV - Single Lumen 04/26/18 0938 Left Wrist 2 days         Peripheral IV - Single Lumen 04/27/18 1325 Right Wrist 1 day                Significant Labs:    CBC/Anemia Profile:    Recent Labs  Lab 04/27/18  1839 04/28/18  0400 04/29/18  0354   WBC 12.55 14.84* 11.22   HGB 8.4* 10.3* 9.8*   HCT 24.6* 30.4* 29.5*    181 121*   MCV 94 89 90   RDW 15.1* 17.8* 18.3*         Chemistries:    Recent Labs  Lab 04/27/18  1903 04/28/18  0400 04/29/18  0354    143 141   K 3.6 3.3* 4.0   * 111* 111*   CO2 24 24 23   BUN 16 16 18   CREATININE 0.9 0.9 1.1   CALCIUM 7.7* 7.9* 8.2*   MG 1.6 2.4  --    PHOS 2.4*  --   --        All pertinent labs within the past 24 hours have been reviewed.    Significant Imaging:  I have reviewed all pertinent imaging results/findings within the past 24 hours.      Assessment/Plan:     Pulmonary   Hypoxia    Supplemental oxygen to keep sat > 92  Encourage pulmonary toilet with TCDB, IS, and mobilization        Cardiac/Vascular   * CAD, multiple vessel    Now s/p CABG x 2   CVT and cardiology following  Continue ASA, plavix  CVT holding lopressor; monitor hemodynamics        Renal/   Stage 3 chronic kidney disease    Strict I/O  Creatinine holding post op; monitor        Endocrine   Type 2 diabetes mellitus with hyperglycemia, without long-term current use of insulin    Transition infusion to long acting plus SSI prn with monitoring for glucose control and prevention of insulin toxicity        Preventive Measures:   Nutrition: cardiac diet  Stress Ulcer: pepcid  DVT: LMWH/SCDs  BB: lopressor held by CVT s/t BP  Bowel Prophylaxis: miralax  Head of Bed/Reposition: Elevate HOB and turn Q1-2 hours    Mobility: OOB with assist, sternal precautions  Code Status: Full    Counseling/Consultation: I have discussed the care of this patient in detail with nursing staff and Dr. Montalvo. Status and plan of care discussed with team on multidisciplinary rounds.   Will transfer out of ICU today per CVT recs; we will sign off on transfer; please call if we can be of any further assistance.     Yessica Rutledge NP  Critical Care Medicine  [facility] -

## 2018-04-29 NOTE — ASSESSMENT & PLAN NOTE
Now s/p CABG x 2   CVT and cardiology following  Continue ASA, plavix  CVT holding lopressor; monitor hemodynamics

## 2018-04-29 NOTE — ASSESSMENT & PLAN NOTE
- s/p CABG x 2 POD#0  - ROBERTO CARLOS drain and chest tube draining well  - still on mechanical ventilation, pressor support  - post op management per CVT    4/28 - expected post op course, will start lovenox (VTE proph) this evening

## 2018-04-29 NOTE — HOSPITAL COURSE
4/28 - extubated this AM.  WBC slightly increase (rectionary). No typical CP or bleeding  4/29/2018 - mild hpt, no fever, urine output adequate, slight thrombocytopenia, creatinine slightly worse    01 May:  Ambulating well with therapy.  Orientation improved.  Tolerating regular diet.  Wounds clean and intact. 5/2/18 The patient continued to improve after surgery and participated well with therapy. No acute issues overnight. [facility] accepted the patient and the patient will continue PT/OT. The patient was seen and examined today and deemed stable for discharge. The patient will follow up with CVT PA in 2 weeks, CVT surgeon in 4 weeks, Cardiology and her PCP.

## 2018-04-29 NOTE — PLAN OF CARE
"Problem: Patient Care Overview  Goal: Plan of Care Review  Patient currently requires max assist x 2 for bed mobility and transfers, as well as frequent cueing to follow sternal precautions.   Outcome: Ongoing (interventions implemented as appropriate)  NEURO: Alert but disoriented to time, place, and situation. Majority of the shift was able to verbalize needs effectively but did experience a couple episodes of "word salad" for example, patient kept stating she could not bleed when what she meant was she could not breath. Is able to follow commands but is slow to respond both verbally and physically to commands. Requiring constant redirection regarding sternal precautions and importance of increasing activity. Patient noted to continually moan and/or grunt when in room alone.        CARDIO: SR with first degree AVB on monitor. Isolated unifocal PVCs noted this shift. Arterial line patent and B/P borderline hypertensive all shift with confirmed HTN with c/o pain. Pacing wires secured and taped to chest wall. Peripheral pulses palpable.  No c/o chest pain with activity. Chest tubes x3, with scant serosanginous bloody drainage, averaging 10-15ml/hr.     PULMONARY: BBS coarse with diminished sounds to bases. Instructed hourly to use IS. Not self motivated to participate in POC but will comply with IS if staff places device in her mouth. Pulling 250ml during shift. Requiring oxygen at 2 LPM/NC to maintain saturation levels > 93%.      : Urinary output remains low, average hourly output 20-30 per indwelling catheter. Will have a couple hours of 100-150 ml/hr after IV lasix. Patient with +1 generalized edema at this time.     ENDOCRINE: Remains on insulin drip at this time.  Hourly CBG readings ranging 111-287. Infusion rate not changed this shift as per nomogram.  Appetite poor.    SKIN: Midsternal and leg incisions well approximated.  Has bulb suction drain to left leg.  At start of shift, dressing noted to be " "saturated with serosanginous drainage. Dressing changed and no new drainage noted. Bulb suction device collecting 50ml of blood Q shift.  Large, long, string like clot emptied out of divide.     Mobility:  Assisted to bedside recliner this am after morning bath. Did not attempt to actively participate in ADL care.  Requiring max assist of 2 for transfers and bed mobility.  Unable to perform skills such as turning or repositioning in bed or chair without using arms and then just gives up.  Will acknowledge a command but then does not perform it.  Unable to determine if she believes she is performing task or if she did not understand the command.     PAIN: Patient frequently moans and c/o "intolerable" pain but will be found asleep within minutes. Medicated during shift with both IV and PO pain medications with good results.  No adverse reactions noted from opiate order and stated codeine allergy.      "

## 2018-04-29 NOTE — ASSESSMENT & PLAN NOTE
Transition infusion to long acting plus SSI prn with monitoring for glucose control and prevention of insulin toxicity

## 2018-04-29 NOTE — SUBJECTIVE & OBJECTIVE
Review of Systems   Constitutional: Negative for activity change, appetite change, fever and unexpected weight change.   HENT: Negative for congestion, ear discharge and sore throat.    Eyes: Negative for visual disturbance.   Respiratory: Negative for cough and shortness of breath.    Cardiovascular: Positive for chest pain.        Post op   Gastrointestinal: Negative for abdominal pain, diarrhea, nausea and vomiting.   Endocrine: Negative for polyuria.   Genitourinary: Negative for dysuria.   Musculoskeletal: Negative for arthralgias and myalgias.   Skin: Negative for rash.   Allergic/Immunologic: Negative for immunocompromised state.   Neurological: Negative for dizziness, weakness, numbness and headaches.   Hematological: Negative for adenopathy.   Psychiatric/Behavioral: Negative for confusion and decreased concentration.   All other systems reviewed and are negative.    Objective:     Vital Signs (Most Recent):  Temp: 98.7 °F (37.1 °C) (04/28/18 1500)  Pulse: 77 (04/28/18 1800)  Resp: 16 (04/28/18 1800)  BP: (!) 141/61 (04/28/18 1800)  SpO2: 95 % (04/28/18 1800) Vital Signs (24h Range):  Temp:  [98.1 °F (36.7 °C)-99.2 °F (37.3 °C)] 98.7 °F (37.1 °C)  Pulse:  [62-78] 77  Resp:  [14-29] 16  SpO2:  [92 %-99 %] 95 %  BP: ()/(24-99) 141/61     Weight: 92.4 kg (203 lb 11.3 oz)  Body mass index is 34.97 kg/m².    Intake/Output Summary (Last 24 hours) at 04/28/18 2048  Last data filed at 04/28/18 1800   Gross per 24 hour   Intake          3094.63 ml   Output             1716 ml   Net          1378.63 ml      Physical Exam   Constitutional: She is sleeping. She is easily aroused. She appears ill.   HENT:   Head: Atraumatic.   Eyes: Conjunctivae are normal.   Neck: No JVD present. No tracheal deviation present.   Cardiovascular: Normal rate and regular rhythm.  Exam reveals distant heart sounds.    Pulses:       Radial pulses are 2+ on the right side, and 2+ on the left side.        Dorsalis pedis pulses are  2+ on the right side, and 2+ on the left side.   Pulmonary/Chest: Effort normal. No accessory muscle usage. No respiratory distress. She has decreased breath sounds. She has no wheezes. She has no rhonchi. She has no rales.   Shallow respiratory effort   Abdominal: Soft. She exhibits no distension. Bowel sounds are absent.   Musculoskeletal: She exhibits no edema.   Neurological: She is easily aroused.   Skin: Skin is warm and dry. Capillary refill takes less than 2 seconds.            Significant Labs: All pertinent labs within the past 24 hours have been reviewed.    Significant Imaging: I have reviewed and interpreted all pertinent imaging results/findings within the past 24 hours.

## 2018-04-29 NOTE — NURSING
Patient arrived to unit from ICU  Patient in room 238.  Transferred via recliner from ICU  Bedside report given .  Charge nurse advised of patient arrival.   VS currently stable.   Tele monitored applied.   Patient oriented to room, rounding sheet and call bell.   Bed in lowest position, call light in reach.  Encouraged to notify of all needs.   Will continue to monitor.

## 2018-04-29 NOTE — PLAN OF CARE
Problem: Patient Care Overview  Goal: Plan of Care Review  Patient currently requires max assist x 2 for bed mobility and transfers, as well as frequent cueing to follow sternal precautions.   Outcome: Ongoing (interventions implemented as appropriate)  Plan of care reviewed with patient. Patient stable. Alert, disoriented at times. Patient free from falls fall precautions in place. Assist x 2 to the bathroom. Call be;; and belongings with in reach reminded to call for assistance. Ambulated with therapy. IS. Sternal precautions maintained. Blood glucose monitoring. Seen by PT. Up in chair. Dressing clean dry and intact. NSR on monitor. Pain controled by PRN pain medication, as no complaints of pain at this time. Will cont to monitor.

## 2018-04-29 NOTE — PROGRESS NOTES
[facility] Hospital Medicine  Progress Note    Patient Name: Loida Holden  MRN: [id number]  Patient Class: IP- Inpatient   Admission Date: 4/26/2018  Length of Stay: 2 days  Attending Physician: Robb Mukherjee MD  Primary Care Provider: YAKELIN Richardson MD        Subjective:     Principal Problem:CAD, multiple vessel    HPI:  82F h/o CAD, T2DM, HLD, HTN and CVA presented with UA to cardiology clinic earlier this month.  Had LHC scheduled on 4/26 showing MVD.  Had CABG x 2 per CVT today.  Currently in ICU on Levophed and insulin gtt, mechanically ventilated.   Hospital medicine consulted for medical management.     Hospital Course:  4/28 - extubated this AM.  WBC slightly increase (rectionary). No typical CP or bleeding          Review of Systems   Constitutional: Negative for activity change, appetite change, fever and unexpected weight change.   HENT: Negative for congestion, ear discharge and sore throat.    Eyes: Negative for visual disturbance.   Respiratory: Negative for cough and shortness of breath.    Cardiovascular: Positive for chest pain.        Post op   Gastrointestinal: Negative for abdominal pain, diarrhea, nausea and vomiting.   Endocrine: Negative for polyuria.   Genitourinary: Negative for dysuria.   Musculoskeletal: Negative for arthralgias and myalgias.   Skin: Negative for rash.   Allergic/Immunologic: Negative for immunocompromised state.   Neurological: Negative for dizziness, weakness, numbness and headaches.   Hematological: Negative for adenopathy.   Psychiatric/Behavioral: Negative for confusion and decreased concentration.   All other systems reviewed and are negative.    Objective:     Vital Signs (Most Recent):  Temp: 98.7 °F (37.1 °C) (04/28/18 1500)  Pulse: 77 (04/28/18 1800)  Resp: 16 (04/28/18 1800)  BP: (!) 141/61 (04/28/18 1800)  SpO2: 95 % (04/28/18 1800) Vital Signs (24h Range):  Temp:  [98.1 °F (36.7 °C)-99.2 °F (37.3 °C)] 98.7 °F (37.1 °C)  Pulse:   [62-78] 77  Resp:  [14-29] 16  SpO2:  [92 %-99 %] 95 %  BP: ()/(24-99) 141/61     Weight: 92.4 kg (203 lb 11.3 oz)  Body mass index is 34.97 kg/m².    Intake/Output Summary (Last 24 hours) at 04/28/18 2048  Last data filed at 04/28/18 1800   Gross per 24 hour   Intake          3094.63 ml   Output             1716 ml   Net          1378.63 ml      Physical Exam   Constitutional: She is sleeping. She is easily aroused. She appears ill.   HENT:   Head: Atraumatic.   Eyes: Conjunctivae are normal.   Neck: No JVD present. No tracheal deviation present.   Cardiovascular: Normal rate and regular rhythm.  Exam reveals distant heart sounds.    Pulses:       Radial pulses are 2+ on the right side, and 2+ on the left side.        Dorsalis pedis pulses are 2+ on the right side, and 2+ on the left side.   Pulmonary/Chest: Effort normal. No accessory muscle usage. No respiratory distress. She has decreased breath sounds. She has no wheezes. She has no rhonchi. She has no rales.   Shallow respiratory effort   Abdominal: Soft. She exhibits no distension. Bowel sounds are absent.   Musculoskeletal: She exhibits no edema.   Neurological: She is easily aroused.   Skin: Skin is warm and dry. Capillary refill takes less than 2 seconds.            Significant Labs: All pertinent labs within the past 24 hours have been reviewed.    Significant Imaging: I have reviewed and interpreted all pertinent imaging results/findings within the past 24 hours.    Assessment/Plan:      * CAD, multiple vessel    - s/p CABG x 2 POD#0  - ROBERTO CARLOS drain and chest tube draining well  - still on mechanical ventilation, pressor support  - post op management per CVT    4/28 - expected post op course, will start lovenox (VTE proph) this evening          Stage 3 chronic kidney disease    - stable  - monitor I/O  - replete electrolytes prn    4/28 - on lasix, will f/u UOP and creat          Type 2 diabetes mellitus with hyperglycemia, without long-term current  use of insulin    - hemoglobin a1c 9.6  - POC glucose >400  - was started on insulin gtt per primary  - will monitor accuchecks q1h, SSI PRN per protocol            VTE Risk Mitigation         Ordered     enoxaparin injection 40 mg  Daily      04/28/18 0902     IP VTE HIGH RISK PATIENT  Once      04/26/18 1446          Critical care time spent on the evaluation and treatment of severe organ dysfunction, review of pertinent labs and imaging studies, discussions with consulting providers and discussions with patient/family: 56 minutes.    Robb Winchester MD  Department of Hospital Medicine   [facility]

## 2018-04-29 NOTE — PT/OT/SLP PROGRESS
Physical Therapy  Treatment    Loida Holden   MRN: [id number]   Admitting Diagnosis: CAD, multiple vessel    PT Received On: 04/29/18  PT Start Time: 1139     PT Stop Time: 1154    PT Total Time (min): 15 min       Billable Minutes:  Gait Training 15 minutes    Treatment Type: Treatment  PT/PTA: PTA     PTA Visit Number: 1       General Precautions: Standard, fall, sternal  Orthopedic Precautions: N/A   Braces:           Subjective:  Communicated with epic and nursing prior to session.      Pain/Comfort  Pain Rating 1: 0/10    Objective:   Patient found with: telemetry, oxygen    Functional Mobility:  Bed Mobility: N/A this session       Transfers:mod A with verbal cues for reminder to not use UEs       Gait: min/mod A with 4L o2 in tow       Stairs:      Balance:   Static Sit: GOOD: Takes MODERATE challenges from all directions  Dynamic Sit: GOOD: Maintains balance through MODERATE excursions of active trunk movement  Static Stand: FAIR: Maintains without assist but unable to take challenges  Dynamic stand: FAIR: Needs CONTACT GUARD during gait     Therapeutic Activities and Exercises:  Sit to stand with mod A and gait training with mod A. Consistent reminders about sternal precautions. Unable to recall sternal precautions. Ambulated ~ 30' x 2 with unsteady gait and required continuous assistance for safety.      AM-PAC 6 CLICK MOBILITY  How much help from another person does this patient currently need?   1 = Unable, Total/Dependent Assistance  2 = A lot, Maximum/Moderate Assistance  3 = A little, Minimum/Contact Guard/Supervision  4 = None, Modified [place]/Independent         AM-PAC Raw Score CMS G-Code Modifier Level of Impairment Assistance   6 % Total / Unable   7 - 9 CM 80 - 100% Maximal Assist   10 - 14 CL 60 - 80% Moderate Assist   15 - 19 CK 40 - 60% Moderate Assist   20 - 22 CJ 20 - 40% Minimal Assist   23 CI 1-20% SBA / CGA   24 CH 0% Independent/ Mod I     Patient left up in  chair with all lines intact, call button in reach and nursing notified.    Assessment:  Loida Holden is a 82 y.o. female with a medical diagnosis of CAD, multiple vessel.    Rehab identified problem list/impairments: Rehab identified problem list/impairments: impaired endurance, weakness, pain, impaired balance, decreased coordination, decreased safety awareness    Rehab potential is good.    Activity tolerance: Fair    Discharge recommendations: Discharge Facility/Level Of Care Needs: home health PT     Barriers to discharge:      Equipment recommendations:       GOALS:    Physical Therapy Goals        Problem: Physical Therapy Goal    Goal Priority Disciplines Outcome Goal Variances Interventions   Physical Therapy Goal     PT/OT, PT      Description:  LTGs to be met within 7 days (5/5/18):  1.  Patient will perform bed mobility with mod assist  2.  Patient will perform functional transfers with mod assist  3.  Patient will ambulate 100' without AD with min assist and no gross LOB  4.  Patient will verbalize all sternal precautions with 100% accuracy                      PLAN:    Patient to be seen 5 x/week  to address the above listed problems via gait training, therapeutic activities, therapeutic exercises  Plan of Care expires: 05/05/18  Plan of Care reviewed with: patient         Sid Butler, PTA  04/29/2018

## 2018-04-29 NOTE — SUBJECTIVE & OBJECTIVE
Interval History: no acute events o/n    Review of Systems   Constitution: Negative.   HENT: Negative.    Eyes: Negative.    Cardiovascular: Positive for chest pain.   Respiratory: Negative.    Endocrine: Negative.    Hematologic/Lymphatic: Negative.    Skin: Negative.    Musculoskeletal: Negative.    Gastrointestinal: Negative.    Genitourinary: Negative.    Neurological: Negative.    Psychiatric/Behavioral: Negative.    Allergic/Immunologic: Negative.      Objective:     Vital Signs (Most Recent):  Temp: 97.9 °F (36.6 °C) (04/29/18 1552)  Pulse: 82 (04/29/18 1552)  Resp: 18 (04/29/18 1552)  BP: (!) 153/65 (04/29/18 1552)  SpO2: 97 % (04/29/18 1552) Vital Signs (24h Range):  Temp:  [97.9 °F (36.6 °C)-99.8 °F (37.7 °C)] 97.9 °F (36.6 °C)  Pulse:  [75-91] 82  Resp:  [16-22] 18  SpO2:  [94 %-97 %] 97 %  BP: (127-179)/() 153/65     Weight: 91.6 kg (201 lb 15.1 oz)  Body mass index is 34.66 kg/m².     SpO2: 97 %  O2 Device (Oxygen Therapy): nasal cannula      Intake/Output Summary (Last 24 hours) at 04/29/18 1557  Last data filed at 04/29/18 1100   Gross per 24 hour   Intake          1238.71 ml   Output             1309 ml   Net           -70.29 ml       Lines/Drains/Airways     Peripheral Intravenous Line                 Peripheral IV - Single Lumen 04/26/18 0938 Left Wrist 3 days         Peripheral IV - Single Lumen 04/27/18 1325 Right Wrist 2 days                Physical Exam   Constitutional: She is oriented to person, place, and time. She appears well-developed and well-nourished. No distress.   HENT:   Head: Normocephalic and atraumatic.   Nose: Nose normal.   Mouth/Throat: Oropharynx is clear and moist.   Eyes: Conjunctivae and EOM are normal. No scleral icterus.   Neck: Normal range of motion. Neck supple. No JVD present. No thyromegaly present.   Cardiovascular: Normal rate, regular rhythm, S1 normal and S2 normal.  Exam reveals no gallop, no S3, no S4 and no friction rub.    Murmur  heard.  Pulmonary/Chest: Effort normal and breath sounds normal. No stridor. No respiratory distress. She has no wheezes. She has no rales (coarse bs). She exhibits tenderness.   Chest tubes in place; medial sternotomy dressing c/d/i   Abdominal: Soft. Bowel sounds are normal. She exhibits no distension and no mass. There is no tenderness. There is no rebound.   Genitourinary:   Genitourinary Comments: Deferred   Musculoskeletal: Normal range of motion. She exhibits no edema, tenderness or deformity.   Lymphadenopathy:     She has no cervical adenopathy.   Neurological: She is alert and oriented to person, place, and time. She exhibits normal muscle tone. Coordination normal.   Skin: Skin is warm and dry. No rash noted. She is not diaphoretic. No erythema. No pallor.   Psychiatric: She has a normal mood and affect. Her behavior is normal. Judgment and thought content normal.   Nursing note and vitals reviewed.      Significant Labs:   All pertinent lab results from the last 24 hours have been reviewed. and   Recent Lab Results       04/29/18  1120 04/29/18  0721 04/29/18  0550 04/29/18  0512 04/29/18  0400      Anion Gap          Baso #          Basophil%          BUN, Bld          Calcium          Chloride          CO2          Creatinine          Differential Method          eGFR if           eGFR if non           Eos #          Eosinophil%          Glucose          Gran # (ANC)          Gran%          Hematocrit          Hemoglobin          Lymph #          Lymph%          MCH          MCHC          MCV          Mono #          Mono%          MPV          Platelets          POCT Glucose 261(H) 126(H) 128(H) 136(H) 111(H)     Potassium          RBC          RDW          Sodium          WBC                      04/29/18  0354 04/29/18  0310 04/29/18  0204 04/29/18  0109 04/29/18  0000      Anion Gap 7(L)         Baso # 0.01         Basophil% 0.1         BUN, Bld 18         Calcium  8.2(L)         Chloride 111(H)         CO2 23         Creatinine 1.1         Differential Method Automated         eGFR if  54(A)         eGFR if non  47  Comment:  Calculation used to obtain the estimated glomerular filtration  rate (eGFR) is the CKD-EPI equation.   (A)         Eos # 0.1         Eosinophil% 0.5         Glucose 122(H)         Gran # (ANC) 8.3(H)         Gran% 74.2(H)         Hematocrit 29.5(L)         Hemoglobin 9.8(L)         Lymph # 1.3         Lymph% 11.9(L)         MCH 30.0         MCHC 33.2         MCV 90         Mono # 1.5(H)         Mono% 13.3         MPV 9.8         Platelets 121(L)         POCT Glucose  118(H) 120(H) 129(H) 118(H)     Potassium 4.0         RBC 3.27(L)         RDW 18.3(H)         Sodium 141         WBC 11.22                     04/28/18  2323 04/28/18  2205 04/28/18  2109 04/28/18  2004 04/28/18  1908      Anion Gap          Baso #          Basophil%          BUN, Bld          Calcium          Chloride          CO2          Creatinine          Differential Method          eGFR if           eGFR if non           Eos #          Eosinophil%          Glucose          Gran # (ANC)          Gran%          Hematocrit          Hemoglobin          Lymph #          Lymph%          MCH          MCHC          MCV          Mono #          Mono%          MPV          Platelets          POCT Glucose 120(H) 130(H) 112(H) 116(H) 115(H)     Potassium          RBC          RDW          Sodium          WBC                      04/28/18  1815 04/28/18  1711 04/28/18  1600      Anion Gap        Baso #        Basophil%        BUN, Bld        Calcium        Chloride        CO2        Creatinine        Differential Method        eGFR if         eGFR if non         Eos #        Eosinophil%        Glucose        Gran # (ANC)        Gran%        Hematocrit        Hemoglobin        Lymph #        Lymph%         MCH        MCHC        MCV        Mono #        Mono%        MPV        Platelets        POCT Glucose 108 131(H) 149(H)     Potassium        RBC        RDW        Sodium        WBC              Significant Imaging: Echocardiogram:   2D echo with color flow doppler:   Results for orders placed or performed during the hospital encounter of 04/26/18   2D echo with color flow doppler   Result Value Ref Range    EF 60 55 - 65    Mitral Valve Regurgitation MILD     Est. PA Systolic Pressure 22.89     Mitral Valve Mobility NORMAL     Tricuspid Valve Regurgitation TRIVIAL     and X-Ray: CXR: X-Ray Chest 1 View (CXR): No results found for this visit on 04/26/18.

## 2018-04-29 NOTE — PROGRESS NOTES
[facility] -   Cardiology  Progress Note    Patient Name: Loida Holden  MRN: [id number]  Admission Date: 4/26/2018  Hospital Length of Stay: 3 days  Code Status: Full Code   Attending Physician: Robb Mukherjee MD   Primary Care Physician: YAKELIN Richardson MD  Expected Discharge Date:   Principal Problem:CAD, multiple vessel    Subjective:   HPI:  83 yo female with PMH CAD s/p PCI remote, and repeat LHC in 2012 nonobstructive, HTN, HLD, NIDDM, and h/o CVA. Echo in 2016 normal EF and LVH. Patient followed by [facility]. Seen in clinic earlier this month with complaints of lower sternal chest tightness for the past few months while walking to mailbox, resolved after resting. Also complained of chest pain after eating and associated with  dyspnea. Unable to do yard work due to pain. Patient underwent LHC today with Dr. Mukherjee and noted to have Severe multivessel CAD with Normal LVEF. CVT consulted for CABG     Hospital Course:   4/27/18- Patient post angiogram on yesterday that showed mutivessel CAD. CVT consulted for CABG. Planning for CABG x 2-3 today. Hypokalemia on morning labs. Vitals stable    4/28/18 - S/p CABG, doing well post op; with chest soreness, using IS; hemodynamically stable  4/29/18 - did well overnight, sitting in chair, eating tired but denied SOB, +chest soreness, will have chest tubes out and tx to tele    Interval History: no acute events o/n    Review of Systems   Constitution: Negative.   HENT: Negative.    Eyes: Negative.    Cardiovascular: Positive for chest pain.   Respiratory: Negative.    Endocrine: Negative.    Hematologic/Lymphatic: Negative.    Skin: Negative.    Musculoskeletal: Negative.    Gastrointestinal: Negative.    Genitourinary: Negative.    Neurological: Negative.    Psychiatric/Behavioral: Negative.    Allergic/Immunologic: Negative.      Objective:     Vital Signs (Most Recent):  Temp: 97.9 °F (36.6 °C) (04/29/18 1552)  Pulse: 82 (04/29/18  1552)  Resp: 18 (04/29/18 1552)  BP: (!) 153/65 (04/29/18 1552)  SpO2: 97 % (04/29/18 1552) Vital Signs (24h Range):  Temp:  [97.9 °F (36.6 °C)-99.8 °F (37.7 °C)] 97.9 °F (36.6 °C)  Pulse:  [75-91] 82  Resp:  [16-22] 18  SpO2:  [94 %-97 %] 97 %  BP: (127-179)/() 153/65     Weight: 91.6 kg (201 lb 15.1 oz)  Body mass index is 34.66 kg/m².     SpO2: 97 %  O2 Device (Oxygen Therapy): nasal cannula      Intake/Output Summary (Last 24 hours) at 04/29/18 1557  Last data filed at 04/29/18 1100   Gross per 24 hour   Intake          1238.71 ml   Output             1309 ml   Net           -70.29 ml       Lines/Drains/Airways     Peripheral Intravenous Line                 Peripheral IV - Single Lumen 04/26/18 0938 Left Wrist 3 days         Peripheral IV - Single Lumen 04/27/18 1325 Right Wrist 2 days                Physical Exam   Constitutional: She is oriented to person, place, and time. She appears well-developed and well-nourished. No distress.   HENT:   Head: Normocephalic and atraumatic.   Nose: Nose normal.   Mouth/Throat: Oropharynx is clear and moist.   Eyes: Conjunctivae and EOM are normal. No scleral icterus.   Neck: Normal range of motion. Neck supple. No JVD present. No thyromegaly present.   Cardiovascular: Normal rate, regular rhythm, S1 normal and S2 normal.  Exam reveals no gallop, no S3, no S4 and no friction rub.    Murmur heard.  Pulmonary/Chest: Effort normal and breath sounds normal. No stridor. No respiratory distress. She has no wheezes. She has no rales (coarse bs). She exhibits tenderness.   Chest tubes in place; medial sternotomy dressing c/d/i   Abdominal: Soft. Bowel sounds are normal. She exhibits no distension and no mass. There is no tenderness. There is no rebound.   Genitourinary:   Genitourinary Comments: Deferred   Musculoskeletal: Normal range of motion. She exhibits no edema, tenderness or deformity.   Lymphadenopathy:     She has no cervical adenopathy.   Neurological: She is alert  and oriented to person, place, and time. She exhibits normal muscle tone. Coordination normal.   Skin: Skin is warm and dry. No rash noted. She is not diaphoretic. No erythema. No pallor.   Psychiatric: She has a normal mood and affect. Her behavior is normal. Judgment and thought content normal.   Nursing note and vitals reviewed.      Significant Labs:   All pertinent lab results from the last 24 hours have been reviewed. and   Recent Lab Results       04/29/18  1120 04/29/18  0721 04/29/18  0550 04/29/18  0512 04/29/18  0400      Anion Gap          Baso #          Basophil%          BUN, Bld          Calcium          Chloride          CO2          Creatinine          Differential Method          eGFR if           eGFR if non           Eos #          Eosinophil%          Glucose          Gran # (ANC)          Gran%          Hematocrit          Hemoglobin          Lymph #          Lymph%          MCH          MCHC          MCV          Mono #          Mono%          MPV          Platelets          POCT Glucose 261(H) 126(H) 128(H) 136(H) 111(H)     Potassium          RBC          RDW          Sodium          WBC                      04/29/18  0354 04/29/18  0310 04/29/18  0204 04/29/18  0109 04/29/18  0000      Anion Gap 7(L)         Baso # 0.01         Basophil% 0.1         BUN, Bld 18         Calcium 8.2(L)         Chloride 111(H)         CO2 23         Creatinine 1.1         Differential Method Automated         eGFR if  54(A)         eGFR if non  47  Comment:  Calculation used to obtain the estimated glomerular filtration  rate (eGFR) is the CKD-EPI equation.   (A)         Eos # 0.1         Eosinophil% 0.5         Glucose 122(H)         Gran # (ANC) 8.3(H)         Gran% 74.2(H)         Hematocrit 29.5(L)         Hemoglobin 9.8(L)         Lymph # 1.3         Lymph% 11.9(L)         MCH 30.0         MCHC 33.2         MCV 90         Mono # 1.5(H)          Mono% 13.3         MPV 9.8         Platelets 121(L)         POCT Glucose  118(H) 120(H) 129(H) 118(H)     Potassium 4.0         RBC 3.27(L)         RDW 18.3(H)         Sodium 141         WBC 11.22                     04/28/18  2323 04/28/18  2205 04/28/18  2109 04/28/18  2004 04/28/18  1908      Anion Gap          Baso #          Basophil%          BUN, Bld          Calcium          Chloride          CO2          Creatinine          Differential Method          eGFR if           eGFR if non           Eos #          Eosinophil%          Glucose          Gran # (ANC)          Gran%          Hematocrit          Hemoglobin          Lymph #          Lymph%          MCH          MCHC          MCV          Mono #          Mono%          MPV          Platelets          POCT Glucose 120(H) 130(H) 112(H) 116(H) 115(H)     Potassium          RBC          RDW          Sodium          WBC                      04/28/18  1815 04/28/18  1711 04/28/18  1600      Anion Gap        Baso #        Basophil%        BUN, Bld        Calcium        Chloride        CO2        Creatinine        Differential Method        eGFR if         eGFR if non         Eos #        Eosinophil%        Glucose        Gran # (ANC)        Gran%        Hematocrit        Hemoglobin        Lymph #        Lymph%        MCH        MCHC        MCV        Mono #        Mono%        MPV        Platelets        POCT Glucose 108 131(H) 149(H)     Potassium        RBC        RDW        Sodium        WBC              Significant Imaging: Echocardiogram:   2D echo with color flow doppler:   Results for orders placed or performed during the hospital encounter of 04/26/18   2D echo with color flow doppler   Result Value Ref Range    EF 60 55 - 65    Mitral Valve Regurgitation MILD     Est. PA Systolic Pressure 22.89     Mitral Valve Mobility NORMAL     Tricuspid Valve Regurgitation TRIVIAL     and X-Ray: CXR:  X-Ray Chest 1 View (CXR): No results found for this visit on 04/26/18.    Assessment and Plan:     Brief HPI: see above    * CAD, multiple vessel    S/p CABG  Cont asa, plavix, bb  Statin intolerant  Cont IS  Chest tube management per CTS          Stage 3 chronic kidney disease    Monitor        Unstable angina    -Patient underwent LHC to evaluate symptoms.   -Severe multivessel CAD with Normal LVEF  -s/p CABG            VTE Risk Mitigation         Ordered     enoxaparin injection 40 mg  Daily      04/28/18 0902     IP VTE HIGH RISK PATIENT  Once      04/26/18 1446      Critical Care Time: 35 minutes  Critical secondary to Patient has a condition that poses threat to life and bodily function:    cad s/p cabg with chest tubes in ICU  Critical care was time spent personally by me on the following activities: development of treatment plan with patient or surrogate and bedside caregivers, discussions with consultants, evaluation of patient's response to treatment, examination of patient, ordering and performing treatments and interventions, ordering and review of laboratory studies, ordering and review of cardiac and radiographic studies, telemetry and re-evaluation of patient's condition. This critical care time did not overlap with that of any other provider or involve time for any procedures.    Bg Michelle Md, MD  Cardiology  [facility] -

## 2018-04-29 NOTE — PLAN OF CARE
Problem: Physical Therapy Goal  Goal: Physical Therapy Goal  LTGs to be met within 7 days (5/5/18):  1.  Patient will perform bed mobility with mod assist  2.  Patient will perform functional transfers with mod assist  3.  Patient will ambulate 100' without AD with min assist and no gross LOB  4.  Patient will verbalize all sternal precautions with 100% accuracy     Outcome: Ongoing (interventions implemented as appropriate)  Patient ambulated ~ 30 feet x 2 with mod A for safety. Unsteady balance and unable to recall all sternal precautions.

## 2018-04-30 LAB
ANION GAP SERPL CALC-SCNC: 9 MMOL/L
BACTERIA NPH AEROBE CULT: NORMAL
BASOPHILS # BLD AUTO: 0.01 K/UL
BASOPHILS NFR BLD: 0.1 %
BLD PROD TYP BPU: NORMAL
BLD PROD TYP BPU: NORMAL
BLOOD UNIT EXPIRATION DATE: NORMAL
BLOOD UNIT EXPIRATION DATE: NORMAL
BLOOD UNIT TYPE CODE: 5100
BLOOD UNIT TYPE CODE: 5100
BLOOD UNIT TYPE: NORMAL
BLOOD UNIT TYPE: NORMAL
BNP SERPL-MCNC: 429 PG/ML
BUN SERPL-MCNC: 19 MG/DL
CALCIUM SERPL-MCNC: 8.5 MG/DL
CHLORIDE SERPL-SCNC: 106 MMOL/L
CO2 SERPL-SCNC: 26 MMOL/L
CODING SYSTEM: NORMAL
CODING SYSTEM: NORMAL
CREAT SERPL-MCNC: 1 MG/DL
DIFFERENTIAL METHOD: ABNORMAL
DISPENSE STATUS: NORMAL
DISPENSE STATUS: NORMAL
EOSINOPHIL # BLD AUTO: 0.2 K/UL
EOSINOPHIL NFR BLD: 1.9 %
ERYTHROCYTE [DISTWIDTH] IN BLOOD BY AUTOMATED COUNT: 18 %
EST. GFR  (AFRICAN AMERICAN): >60 ML/MIN/1.73 M^2
EST. GFR  (NON AFRICAN AMERICAN): 53 ML/MIN/1.73 M^2
GLUCOSE SERPL-MCNC: 208 MG/DL
HCT VFR BLD AUTO: 29.5 %
HGB BLD-MCNC: 9.2 G/DL
LYMPHOCYTES # BLD AUTO: 1.4 K/UL
LYMPHOCYTES NFR BLD: 14.9 %
MCH RBC QN AUTO: 29.2 PG
MCHC RBC AUTO-ENTMCNC: 31.2 G/DL
MCV RBC AUTO: 94 FL
MONOCYTES # BLD AUTO: 0.9 K/UL
MONOCYTES NFR BLD: 9.7 %
NEUTROPHILS # BLD AUTO: 6.8 K/UL
NEUTROPHILS NFR BLD: 73.4 %
NUM UNITS TRANS PACKED RBC: NORMAL
NUM UNITS TRANS PACKED RBC: NORMAL
PLATELET # BLD AUTO: 111 K/UL
PMV BLD AUTO: 10.5 FL
POCT GLUCOSE: 132 MG/DL (ref 70–110)
POCT GLUCOSE: 135 MG/DL (ref 70–110)
POCT GLUCOSE: 142 MG/DL (ref 70–110)
POCT GLUCOSE: 206 MG/DL (ref 70–110)
POTASSIUM SERPL-SCNC: 3.9 MMOL/L
RBC # BLD AUTO: 3.15 M/UL
SODIUM SERPL-SCNC: 141 MMOL/L
WBC # BLD AUTO: 9.3 K/UL

## 2018-04-30 PROCEDURE — 94799 UNLISTED PULMONARY SVC/PX: CPT

## 2018-04-30 PROCEDURE — 99232 SBSQ HOSP IP/OBS MODERATE 35: CPT | Mod: ,,, | Performed by: INTERNAL MEDICINE

## 2018-04-30 PROCEDURE — 25000003 PHARM REV CODE 250: Performed by: PHYSICIAN ASSISTANT

## 2018-04-30 PROCEDURE — 80048 BASIC METABOLIC PNL TOTAL CA: CPT

## 2018-04-30 PROCEDURE — S0028 INJECTION, FAMOTIDINE, 20 MG: HCPCS | Performed by: INTERNAL MEDICINE

## 2018-04-30 PROCEDURE — 25000003 PHARM REV CODE 250: Performed by: INTERNAL MEDICINE

## 2018-04-30 PROCEDURE — 85025 COMPLETE CBC W/AUTO DIFF WBC: CPT

## 2018-04-30 PROCEDURE — 25000003 PHARM REV CODE 250: Performed by: THORACIC SURGERY (CARDIOTHORACIC VASCULAR SURGERY)

## 2018-04-30 PROCEDURE — 63600175 PHARM REV CODE 636 W HCPCS: Performed by: FAMILY MEDICINE

## 2018-04-30 PROCEDURE — 83880 ASSAY OF NATRIURETIC PEPTIDE: CPT

## 2018-04-30 PROCEDURE — 63600175 PHARM REV CODE 636 W HCPCS: Performed by: NURSE PRACTITIONER

## 2018-04-30 PROCEDURE — 97116 GAIT TRAINING THERAPY: CPT

## 2018-04-30 PROCEDURE — 21400001 HC TELEMETRY ROOM

## 2018-04-30 PROCEDURE — 94761 N-INVAS EAR/PLS OXIMETRY MLT: CPT

## 2018-04-30 PROCEDURE — 36415 COLL VENOUS BLD VENIPUNCTURE: CPT

## 2018-04-30 PROCEDURE — 27000221 HC OXYGEN, UP TO 24 HOURS

## 2018-04-30 RX ORDER — HYDRALAZINE HYDROCHLORIDE 20 MG/ML
10 INJECTION INTRAMUSCULAR; INTRAVENOUS EVERY 8 HOURS PRN
Status: DISCONTINUED | OUTPATIENT
Start: 2018-04-30 | End: 2018-04-30

## 2018-04-30 RX ORDER — FAMOTIDINE 20 MG/1
20 TABLET, FILM COATED ORAL DAILY
Status: DISCONTINUED | OUTPATIENT
Start: 2018-05-01 | End: 2018-05-01

## 2018-04-30 RX ORDER — FAMOTIDINE 20 MG/50ML
20 INJECTION, SOLUTION INTRAVENOUS DAILY
Status: DISCONTINUED | OUTPATIENT
Start: 2018-04-30 | End: 2018-04-30

## 2018-04-30 RX ORDER — LISINOPRIL 10 MG/1
10 TABLET ORAL DAILY
Status: DISCONTINUED | OUTPATIENT
Start: 2018-04-30 | End: 2018-05-02 | Stop reason: HOSPADM

## 2018-04-30 RX ADMIN — METOPROLOL TARTRATE 50 MG: 50 TABLET ORAL at 08:04

## 2018-04-30 RX ADMIN — FUROSEMIDE 20 MG: 10 INJECTION, SOLUTION INTRAVENOUS at 09:04

## 2018-04-30 RX ADMIN — CHLORHEXIDINE GLUCONATE 10 ML: 1.2 RINSE ORAL at 08:04

## 2018-04-30 RX ADMIN — CHLORHEXIDINE GLUCONATE 10 ML: 1.2 RINSE ORAL at 09:04

## 2018-04-30 RX ADMIN — ACETAMINOPHEN 650 MG: 325 TABLET, FILM COATED ORAL at 05:04

## 2018-04-30 RX ADMIN — ENOXAPARIN SODIUM 40 MG: 100 INJECTION SUBCUTANEOUS at 05:04

## 2018-04-30 RX ADMIN — INSULIN DETEMIR 20 UNITS: 100 INJECTION, SOLUTION SUBCUTANEOUS at 09:04

## 2018-04-30 RX ADMIN — INSULIN ASPART 4 UNITS: 100 INJECTION, SOLUTION INTRAVENOUS; SUBCUTANEOUS at 10:04

## 2018-04-30 RX ADMIN — ACETAMINOPHEN 650 MG: 325 TABLET, FILM COATED ORAL at 12:04

## 2018-04-30 RX ADMIN — ACETAMINOPHEN 650 MG: 325 TABLET, FILM COATED ORAL at 01:04

## 2018-04-30 RX ADMIN — LISINOPRIL 10 MG: 10 TABLET ORAL at 11:04

## 2018-04-30 RX ADMIN — CLOPIDOGREL BISULFATE 75 MG: 75 TABLET ORAL at 08:04

## 2018-04-30 RX ADMIN — METOPROLOL TARTRATE 50 MG: 50 TABLET ORAL at 09:04

## 2018-04-30 RX ADMIN — POLYETHYLENE GLYCOL 3350 17 G: 17 POWDER, FOR SOLUTION ORAL at 08:04

## 2018-04-30 RX ADMIN — FAMOTIDINE 20 MG: 20 INJECTION, SOLUTION INTRAVENOUS at 08:04

## 2018-04-30 RX ADMIN — LEVOTHYROXINE SODIUM 50 MCG: 50 TABLET ORAL at 05:04

## 2018-04-30 RX ADMIN — ASPIRIN 81 MG: 81 TABLET, COATED ORAL at 08:04

## 2018-04-30 RX ADMIN — INSULIN DETEMIR 20 UNITS: 100 INJECTION, SOLUTION SUBCUTANEOUS at 08:04

## 2018-04-30 RX ADMIN — DOCUSATE SODIUM 100 MG: 100 CAPSULE, LIQUID FILLED ORAL at 08:04

## 2018-04-30 RX ADMIN — DOCUSATE SODIUM 100 MG: 100 CAPSULE, LIQUID FILLED ORAL at 09:04

## 2018-04-30 RX ADMIN — FUROSEMIDE 20 MG: 10 INJECTION, SOLUTION INTRAVENOUS at 08:04

## 2018-04-30 NOTE — PROGRESS NOTES
CVT Cardiothoracic Surgery    PLAN: continue to ambulate , I/S, wean oxygen, home in a couple days   Cardiac meds:   Asa, plavix, lasix, bb, will add statin    Prudence Lemus    POD 3: CABG  Status: Telemetry  Interval History:  No acute issues, ambulated around nurses station with assistance and O2 this am    Awake and alert ,     Pressors: None   Rhythm: NSR  Respiratory: No dyspnea at rest , on O2, lungs clear  Tubes: Removed  Extremity: minimal peripheral edema   Sternum: Stable   Wounds: Clean, dry and intact   Mobility: Ambulating with assistance     Labs:  Lab Results   Component Value Date    WBC 9.30 04/30/2018    HGB 9.2 (L) 04/30/2018    HCT 29.5 (L) 04/30/2018    MCV 94 04/30/2018     (L) 04/30/2018     BMP  Lab Results   Component Value Date     04/30/2018    K 3.9 04/30/2018     04/30/2018    CO2 26 04/30/2018    BUN 19 04/30/2018    CREATININE 1.0 04/30/2018    CALCIUM 8.5 (L) 04/30/2018    ANIONGAP 9 04/30/2018    ESTGFRAFRICA >60 04/30/2018    EGFRNONAA 53 (A) 04/30/2018       Imaging:   Stable post op     VITAL SIGNS: 24 HR MIN & MAX LAST    Temp  Min: 97.4 °F (36.3 °C)  Max: 98.1 °F (36.7 °C)  97.8 °F (36.6 °C)        BP  Min: 141/69  Max: 199/73  (!) 157/70     Pulse  Min: 71  Max: 96  73     Resp  Min: 16  Max: 18  18    SpO2  Min: 95 %  Max: 100 %  98 %      Intake/Output:  I/O this shift:  In: 120 [P.O.:120]  Out: 600 [Urine:600]  I/O last 3 completed shifts:  In: 1181.7 [P.O.:540; I.V.:641.7]  Out: 1639 [Urine:1489; Chest Tube:150]

## 2018-04-30 NOTE — ASSESSMENT & PLAN NOTE
- stable  - monitor I/O  - replete electrolytes prn    4/28 - on lasix, will f/u UOP and creat  4/29 - creat slightly worse, c;inically not fluid overloaded, decreased lasix

## 2018-04-30 NOTE — SUBJECTIVE & OBJECTIVE
Interval History:     Review of Systems   Constitutional: Negative for activity change, appetite change, fever and unexpected weight change.   HENT: Negative for congestion, ear discharge and sore throat.    Eyes: Negative for visual disturbance.   Respiratory: Negative for cough and shortness of breath.    Cardiovascular: Positive for chest pain.        Post op   Gastrointestinal: Negative for abdominal pain, diarrhea, nausea and vomiting.   Endocrine: Negative for polyuria.   Genitourinary: Negative for dysuria.   Musculoskeletal: Negative for arthralgias and myalgias.   Skin: Negative for rash.   Allergic/Immunologic: Negative for immunocompromised state.   Neurological: Negative for dizziness, weakness, numbness and headaches.   Hematological: Negative for adenopathy.   Psychiatric/Behavioral: Negative for confusion and decreased concentration.   All other systems reviewed and are negative.    Objective:     Vital Signs (Most Recent):  Temp: 97.4 °F (36.3 °C) (04/29/18 1908)  Pulse: 92 (04/29/18 2035)  Resp: 16 (04/29/18 1908)  BP: (!) 166/65 (04/29/18 2035)  SpO2: 95 % (04/29/18 1908) Vital Signs (24h Range):  Temp:  [97.4 °F (36.3 °C)-99.8 °F (37.7 °C)] 97.4 °F (36.3 °C)  Pulse:  [75-96] 92  Resp:  [16-22] 16  SpO2:  [94 %-97 %] 95 %  BP: (127-199)/() 166/65     Weight: 91.6 kg (201 lb 15.1 oz)  Body mass index is 34.66 kg/m².    Intake/Output Summary (Last 24 hours) at 04/29/18 2136  Last data filed at 04/29/18 2032   Gross per 24 hour   Intake          1148.21 ml   Output             1310 ml   Net          -161.79 ml      Physical Exam   Constitutional: She is sleeping. She is easily aroused. She appears ill.   HENT:   Head: Atraumatic.   Eyes: Conjunctivae are normal.   Neck: No JVD present. No tracheal deviation present.   Cardiovascular: Normal rate and regular rhythm.  Exam reveals distant heart sounds.    Pulses:       Radial pulses are 2+ on the right side, and 2+ on the left side.         Dorsalis pedis pulses are 2+ on the right side, and 2+ on the left side.   Pulmonary/Chest: Effort normal. No accessory muscle usage. No respiratory distress. She has decreased breath sounds. She has no wheezes. She has no rhonchi. She has no rales.   Shallow respiratory effort   Abdominal: Soft. She exhibits no distension. Bowel sounds are absent.   Musculoskeletal: She exhibits no edema.   Neurological: She is easily aroused.   Skin: Skin is warm and dry. Capillary refill takes less than 2 seconds.            Significant Labs: All pertinent labs within the past 24 hours have been reviewed.    Significant Imaging: I have reviewed all pertinent imaging results/findings within the past 24 hours.

## 2018-04-30 NOTE — PROGRESS NOTES
[facility] Hospital Medicine  Progress Note    Patient Name: Loida Holden  MRN: [id number]  Patient Class: IP- Inpatient   Admission Date: 4/26/2018  Length of Stay: 3 days  Attending Physician: Robb Mukherjee MD  Primary Care Provider: YAKELIN Richardson MD        Subjective:     Principal Problem:CAD, multiple vessel    HPI:  82F h/o CAD, T2DM, HLD, HTN and CVA presented with UA to cardiology clinic earlier this month.  Had LHC scheduled on 4/26 showing MVD.  Had CABG x 2 per CVT today.  Currently in ICU on Levophed and insulin gtt, mechanically ventilated.   Hospital medicine consulted for medical management.     Hospital Course:  4/28 - extubated this AM.  WBC slightly increase (rectionary). No typical CP or bleeding  4/29/2018 - mild hpt, no fever, urine output adequate, slight thrombocytopenia, creatinine slightly worse      Interval History:     Review of Systems   Constitutional: Negative for activity change, appetite change, fever and unexpected weight change.   HENT: Negative for congestion, ear discharge and sore throat.    Eyes: Negative for visual disturbance.   Respiratory: Negative for cough and shortness of breath.    Cardiovascular: Positive for chest pain.        Post op   Gastrointestinal: Negative for abdominal pain, diarrhea, nausea and vomiting.   Endocrine: Negative for polyuria.   Genitourinary: Negative for dysuria.   Musculoskeletal: Negative for arthralgias and myalgias.   Skin: Negative for rash.   Allergic/Immunologic: Negative for immunocompromised state.   Neurological: Negative for dizziness, weakness, numbness and headaches.   Hematological: Negative for adenopathy.   Psychiatric/Behavioral: Negative for confusion and decreased concentration.   All other systems reviewed and are negative.    Objective:     Vital Signs (Most Recent):  Temp: 97.4 °F (36.3 °C) (04/29/18 1908)  Pulse: 92 (04/29/18 2035)  Resp: 16 (04/29/18 1908)  BP: (!) 166/65 (04/29/18  2035)  SpO2: 95 % (04/29/18 1908) Vital Signs (24h Range):  Temp:  [97.4 °F (36.3 °C)-99.8 °F (37.7 °C)] 97.4 °F (36.3 °C)  Pulse:  [75-96] 92  Resp:  [16-22] 16  SpO2:  [94 %-97 %] 95 %  BP: (127-199)/() 166/65     Weight: 91.6 kg (201 lb 15.1 oz)  Body mass index is 34.66 kg/m².    Intake/Output Summary (Last 24 hours) at 04/29/18 2136  Last data filed at 04/29/18 2032   Gross per 24 hour   Intake          1148.21 ml   Output             1310 ml   Net          -161.79 ml      Physical Exam   Constitutional: She is sleeping. She is easily aroused. She appears ill.   HENT:   Head: Atraumatic.   Eyes: Conjunctivae are normal.   Neck: No JVD present. No tracheal deviation present.   Cardiovascular: Normal rate and regular rhythm.  Exam reveals distant heart sounds.    Pulses:       Radial pulses are 2+ on the right side, and 2+ on the left side.        Dorsalis pedis pulses are 2+ on the right side, and 2+ on the left side.   Pulmonary/Chest: Effort normal. No accessory muscle usage. No respiratory distress. She has decreased breath sounds. She has no wheezes. She has no rhonchi. She has no rales.   Shallow respiratory effort   Abdominal: Soft. She exhibits no distension. Bowel sounds are absent.   Musculoskeletal: She exhibits no edema.   Neurological: She is easily aroused.   Skin: Skin is warm and dry. Capillary refill takes less than 2 seconds.            Significant Labs: All pertinent labs within the past 24 hours have been reviewed.    Significant Imaging: I have reviewed all pertinent imaging results/findings within the past 24 hours.    Assessment/Plan:      * CAD, multiple vessel    - s/p CABG x 2 POD#0  - ROBERTO CARLOS drain and chest tube draining well  - still on mechanical ventilation, pressor support  - post op management per CVT    4/28 - expected post op course, will start lovenox (VTE proph) this evening          Stage 3 chronic kidney disease    - stable  - monitor I/O  - replete electrolytes prn    4/28  - on lasix, will f/u UOP and creat  4/29 - creat slightly worse, c;inically not fluid overloaded, decreased lasix          Type 2 diabetes mellitus with hyperglycemia, without long-term current use of insulin    - hemoglobin a1c 9.6  - POC glucose >400  - was started on insulin gtt per primary  - will monitor accuchecks q1h, SSI PRN per protocol    4/29 - A1c not to goal, nutrition consulted            VTE Risk Mitigation         Ordered     enoxaparin injection 40 mg  Daily      04/28/18 0902     IP VTE HIGH RISK PATIENT  Once      04/26/18 1446              Robb Winchester MD  Department of Hospital Medicine   [facility]

## 2018-04-30 NOTE — PLAN OF CARE
Due to there being some discrepancy in PT's post-hospitalization recommendations (SNF day after surgery vs. HH over weekend), contacted PT who states that most appropriate next level of care for patient is SNF.         Met with patient at bedside and discussed above post-hospitalization recommended level of care (SNF) for her.  Patient receptive to SNF placement and indicates SNF preferences as: 1) [facility] and 2)  [facility].  Patient Preference Form signed reflecting this; and signed form placed in patient's blue folder.    Noted in documentation that no OT services have been ordered.  Requested to NP for hospitalist that OT be ordered due to two disciplines being needed for SNF placement.      Contacted Sofi at [facility] and Karen at [facility] who both indicate that they should have available SNF bed.  Faxed patient information via [employer] [facility] to [facility].    PLAN:  Will need to forward OT Eval to [facility] via [employer] once it is entered into Epic          04/30/18 1601   Discharge Reassessment   Assessment Type Discharge Planning Reassessment   Provided patient/caregiver education on the expected discharge date and the discharge plan Yes   Do you have any problems affording any of your prescribed medications? No   Discharge Plan A Skilled Nursing Facility   Discharge Plan B Skilled Nursing Facility   Patient choice form signed by patient/caregiver Yes   Can the patient answer the patient profile reliably? Yes, cognitively intact   How does the patient rate their overall health at the present time? Good   Describe the patient's ability to walk at the present time. Minor restrictions or changes   How often would a person be available to care for the patient? Occasionally   During the past month, has the patient often been bothered by feeling down, depressed or hopeless? No   During the past month, has the patient often been  bothered by little interest or pleasure in doing things? No

## 2018-04-30 NOTE — PT/OT/SLP PROGRESS
Physical Therapy  Treatment    Loida Holden   MRN: [id number]   Admitting Diagnosis: CAD, multiple vessel    PT Received On: 04/30/18  PT Start Time: 1105     PT Stop Time: 1120    PT Total Time (min): 15 min       Billable Minutes:  Gait Training 15    Treatment Type: Treatment  PT/PTA: PTA     PTA Visit Number: 2       General Precautions: Standard, sternal, fall  Orthopedic Precautions: N/A   Braces: N/A         Subjective:  Communicated with NURSE, SERGE AND CHEN prior to session.  PATIENT SEATED IN B/S CHAIR , AGREE TO TX NOW, SITTER PRESENT.    Pain/Comfort  Pain Rating 1: 6/10  Location - Side 1: Bilateral  Location - Orientation 1: anterior  Location 1: chest  Pain Addressed 1: Pre-medicate for activity, Reposition, Distraction, Cessation of Activity  Pain Rating Post-Intervention 1: 6/10    Objective:   Patient found with: telemetry, oxygen, SITTER PRESENT .    Functional Mobility:  Bed Mobility:      PATIENT SEATED IN B/S CHAIR AT PRESENT TIME.  Transfers:   SIT TO STAND, STAND TO SIT AT MIN ASSIST X2 FOR SAFETY, CUES FOR STERNAL PERCAUTIONS.    Gait:    AMBULATE INTO HALLWAY AT 60'X2 AT CGA TO MIN ASSIST X2 , PATIETN UNSTEADY AT TIMES, APPEAR CONFUSED AT TIMES.    Stairs  N/A    Balance:   Static Sit: GOOD-: Takes MODERATE challenges from all directions but inconsistently  Dynamic Sit: GOOD: Maintains balance through MODERATE excursions of active trunk movement  Static Stand: POOR+: Needs MINIMAL assist to maintain  Dynamic stand: POOR: N/A     Therapeutic Activities and Exercises:  GT/ T/FS OUT OF ROOM FOR GT ACTIVITY, STERNAL PERCAUTIONS REVIEW WITH RETURN DEMONSTRATE, ASHKAN MELO SHORTSEATED EXERCISES INSTRUCTION.    AM-PAC 6 CLICK MOBILITY  How much help from another person does this patient currently need?   1 = Unable, Total/Dependent Assistance  2 = A lot, Maximum/Moderate Assistance  3 = A little, Minimum/Contact Guard/Supervision  4 = None, Modified [place]/Independent    Turning over  in bed (including adjusting bedclothes, sheets and blankets)?: 3  Sitting down on and standing up from a chair with arms (e.g., wheelchair, bedside commode, etc.): 3  Moving from lying on back to sitting on the side of the bed?: 3  Moving to and from a bed to a chair (including a wheelchair)?: 3  Need to walk in hospital room?: 3  Climbing 3-5 steps with a railing?: 1  Total Score: 16    AM-PAC Raw Score CMS G-Code Modifier Level of Impairment Assistance   6 % Total / Unable   7 - 9 CM 80 - 100% Maximal Assist   10 - 14 CL 60 - 80% Moderate Assist   15 - 19 CK 40 - 60% Moderate Assist   20 - 22 CJ 20 - 40% Minimal Assist   23 CI 1-20% SBA / CGA   24 CH 0% Independent/ Mod I     Patient left up in chair with all lines intact and call button in reach, SITTTER PRESENT.    Assessment:  Loida Holden is a 82 y.o. female with a medical diagnosis CAD..    Rehab identified problem list/impairments: Rehab identified problem list/impairments: weakness, gait instability, impaired endurance, impaired sensation, impaired self care skills, impaired functional mobilty, impaired cognition, impaired balance    Rehab potential is excellent.    Activity tolerance: Good    Discharge recommendations: NURSING WITH SKILLED CARE    Barriers to discharge:      Equipment recommendations: Equipment Needed After Discharge: bath bench     GOALS:    Physical Therapy Goals        Problem: Physical Therapy Goal    Goal Priority Disciplines Outcome Goal Variances Interventions   Physical Therapy Goal     PT/OT, PT Ongoing (interventions implemented as appropriate)     Description:  LTGs to be met within 7 days (5/5/18):  1.  Patient will perform bed mobility with mod assist  2.  Patient will perform functional transfers with mod assist  3.  Patient will ambulate 100' without AD with min assist and no gross LOB  4.  Patient will verbalize all sternal precautions with 100% accuracy                      PLAN:    Patient to be seen  5 x/week  to address the above listed problems via gait training, therapeutic activities, therapeutic exercises  Plan of Care expires: 04/05/18  Plan of Care reviewed with: patient         Myla Ly, PTA  04/30/2018

## 2018-04-30 NOTE — CONSULTS
Consult received   Chart reviewed   Attempted to assess  Patient having intermittent periods of confusion, unable to obtain accurate info  Telephoned son, ANGELO Holden, ([phone]) to complete assessment and discuss teaching plan  He reports she has a home glucose monitor and was instructed per her PCP she did not need to check her glucose as frequently at home  Informed of her A1C 9.6  He and his wife will assist in her care following discharge as needed.  Prior she was able to care for herself  Have scheduled an appointment with them for 10am  5/1/18     Instructed to bring in her glucose monitor and will instruct in use and insulin administration  Will follow

## 2018-04-30 NOTE — ASSESSMENT & PLAN NOTE
-Recovering S/p CABG  -Continue ASA, Plavix, BB  -Intolerant to statin  -Needs to use IS and ambulate

## 2018-04-30 NOTE — ASSESSMENT & PLAN NOTE
- hemoglobin a1c 9.6  - POC glucose >400  - was started on insulin gtt per primary  - will monitor accuchecks q1h, SSI PRN per protocol    4/29 - A1c not to goal, nutrition consulted

## 2018-04-30 NOTE — SUBJECTIVE & OBJECTIVE
Review of Systems   Constitution: Negative for chills, decreased appetite, fever, weakness and malaise/fatigue.   HENT: Negative for congestion, hoarse voice and sore throat.    Eyes: Negative for blurred vision and discharge.   Cardiovascular: Negative for chest pain, claudication, cyanosis, dyspnea on exertion, irregular heartbeat, leg swelling, near-syncope, orthopnea, palpitations and paroxysmal nocturnal dyspnea.        Incisional pain   Respiratory: Negative for cough, hemoptysis, shortness of breath, snoring, sputum production and wheezing.    Endocrine: Negative for cold intolerance and heat intolerance.   Hematologic/Lymphatic: Negative for bleeding problem. Does not bruise/bleed easily.   Skin: Negative for rash.   Musculoskeletal: Negative for arthritis, back pain, joint pain, joint swelling, muscle cramps, muscle weakness and myalgias.   Gastrointestinal: Negative for abdominal pain, constipation, diarrhea, heartburn, melena and nausea.   Genitourinary: Negative for hematuria.   Neurological: Negative for dizziness, focal weakness, headaches, light-headedness, loss of balance, numbness, paresthesias and seizures.   Psychiatric/Behavioral: Negative for memory loss. The patient does not have insomnia.    Allergic/Immunologic: Negative for hives.     Objective:     Vital Signs (Most Recent):  Temp: 98.1 °F (36.7 °C) (04/30/18 0725)  Pulse: 71 (04/30/18 0725)  Resp: 18 (04/30/18 0725)  BP: (!) 144/63 (04/30/18 0725)  SpO2: 100 % (04/30/18 0725) Vital Signs (24h Range):  Temp:  [97.4 °F (36.3 °C)-98.1 °F (36.7 °C)] 98.1 °F (36.7 °C)  Pulse:  [71-96] 71  Resp:  [16-18] 18  SpO2:  [95 %-100 %] 100 %  BP: (141-199)/(63-73) 144/63     Weight: 91.6 kg (201 lb 15.1 oz)  Body mass index is 34.66 kg/m².     SpO2: 100 %  O2 Device (Oxygen Therapy): nasal cannula      Intake/Output Summary (Last 24 hours) at 04/30/18 1130  Last data filed at 04/30/18 0925   Gross per 24 hour   Intake              300 ml   Output               950 ml   Net             -650 ml       Lines/Drains/Airways     Peripheral Intravenous Line                 Peripheral IV - Single Lumen 04/26/18 0938 Left Wrist 4 days         Peripheral IV - Single Lumen 04/27/18 1325 Right Wrist 2 days                Physical Exam   Constitutional: She is oriented to person, place, and time. She appears well-developed and well-nourished. No distress.   HENT:   Head: Normocephalic and atraumatic.   Eyes: Pupils are equal, round, and reactive to light.   Neck: Neck supple. No JVD present.   Cardiovascular: Normal rate, regular rhythm, S1 normal, S2 normal and normal heart sounds.    No murmur heard.  Pulmonary/Chest:   Diminished BS at bases  Sternal incision C/D/I   Abdominal: Soft. Bowel sounds are normal. She exhibits no distension. There is no tenderness. There is no rebound.   Musculoskeletal: She exhibits no edema.   Neurological: She is alert and oriented to person, place, and time.   Skin: Skin is warm and dry. She is not diaphoretic. No erythema.   SVG site LLE; C/D/I; no drainage or erythema   Psychiatric: She has a normal mood and affect. Her behavior is normal. Thought content normal.   Nursing note and vitals reviewed.      Significant Labs:   CMP   Recent Labs  Lab 04/29/18  0354 04/30/18  0847    141   K 4.0 3.9   * 106   CO2 23 26   * 208*   BUN 18 19   CREATININE 1.1 1.0   CALCIUM 8.2* 8.5*   ANIONGAP 7* 9   ESTGFRAFRICA 54* >60   EGFRNONAA 47* 53*   , CBC   Recent Labs  Lab 04/29/18  0354 04/30/18  0847   WBC 11.22 9.30   HGB 9.8* 9.2*   HCT 29.5* 29.5*   * 111*   , Troponin No results for input(s): TROPONINI in the last 48 hours. and All pertinent lab results from the last 24 hours have been reviewed.    Significant Imaging: Echocardiogram:   2D echo with color flow doppler:   Results for orders placed or performed during the hospital encounter of 04/26/18   2D echo with color flow doppler   Result Value Ref Range    EF 60 55 -  65    Mitral Valve Regurgitation MILD     Est. PA Systolic Pressure 22.89     Mitral Valve Mobility NORMAL     Tricuspid Valve Regurgitation TRIVIAL     and X-Ray: CXR: X-Ray Chest 1 View (CXR): No results found for this visit on 04/26/18. and X-Ray Chest PA and Lateral (CXR): No results found for this visit on 04/26/18.

## 2018-04-30 NOTE — PLAN OF CARE
Problem: Patient Care Overview  Goal: Plan of Care Review  Patient currently requires max assist x 2 for bed mobility and transfers, as well as frequent cueing to follow sternal precautions.   Outcome: Ongoing (interventions implemented as appropriate)  Plan of care reviewed with patient. Patient stable. Alert, Disoriented at times. Patient free from falls fall precautions in place. Assist x 1 to the bathroom. Call be;; and belongings with in reach reminded to call for assistance. Up in chair. Ambulated with therapy. Sternal precautions maintained. No complaints of pain at this time. Blood glucose monitoring. LLE incision oozing with movement. Midline incision clean dry and intact. NSR on monitor. Will cont to monitor

## 2018-04-30 NOTE — PROGRESS NOTES
[facility]  Cardiology  Progress Note    Patient Name: Loida Holden  MRN: [id number]  Admission Date: 4/26/2018  Hospital Length of Stay: 4 days  Code Status: Full Code   Attending Physician: Inocente Cam MD   Primary Care Physician: YAKELIN Richardson MD  Expected Discharge Date:   Principal Problem:CAD, multiple vessel    Subjective:   HPI:  81 yo female with PMH CAD s/p PCI remote, and repeat LHC in 2012 nonobstructive, HTN, HLD, NIDDM, and h/o CVA. Echo in 2016 normal EF and LVH. Patient followed by [facility]. Seen in clinic earlier this month with complaints of lower sternal chest tightness for the past few months while walking to mailbox, resolved after resting. Also complained of chest pain after eating and associated with  dyspnea. Unable to do yard work due to pain. Patient underwent LHC today with Dr. Mukherjee and noted to have Severe multivessel CAD with Normal LVEF. CVT consulted for CABG     Hospital Course:   4/27/18- Patient post angiogram on yesterday that showed mutivessel CAD. CVT consulted for CABG. Planning for CABG x 2-3 today. Hypokalemia on morning labs. Vitals stable    4/28/18 - S/p CABG, doing well post op; with chest soreness, using IS; hemodynamically stable  4/29/18 - did well overnight, sitting in chair, eating tired but denied SOB, +chest soreness, will have chest tubes out and tx to tele    4/30/18-Patient seen and examined today, lying in bed. Complains of incisional pain and weakness. Labs stable. Needs to ambulate. No arrhythmias overnight.        Review of Systems   Constitution: Negative for chills, decreased appetite, fever, weakness and malaise/fatigue.   HENT: Negative for congestion, hoarse voice and sore throat.    Eyes: Negative for blurred vision and discharge.   Cardiovascular: Negative for chest pain, claudication, cyanosis, dyspnea on exertion, irregular heartbeat, leg swelling, near-syncope, orthopnea, palpitations and paroxysmal  nocturnal dyspnea.        Incisional pain   Respiratory: Negative for cough, hemoptysis, shortness of breath, snoring, sputum production and wheezing.    Endocrine: Negative for cold intolerance and heat intolerance.   Hematologic/Lymphatic: Negative for bleeding problem. Does not bruise/bleed easily.   Skin: Negative for rash.   Musculoskeletal: Negative for arthritis, back pain, joint pain, joint swelling, muscle cramps, muscle weakness and myalgias.   Gastrointestinal: Negative for abdominal pain, constipation, diarrhea, heartburn, melena and nausea.   Genitourinary: Negative for hematuria.   Neurological: Negative for dizziness, focal weakness, headaches, light-headedness, loss of balance, numbness, paresthesias and seizures.   Psychiatric/Behavioral: Negative for memory loss. The patient does not have insomnia.    Allergic/Immunologic: Negative for hives.     Objective:     Vital Signs (Most Recent):  Temp: 98.1 °F (36.7 °C) (04/30/18 0725)  Pulse: 71 (04/30/18 0725)  Resp: 18 (04/30/18 0725)  BP: (!) 144/63 (04/30/18 0725)  SpO2: 100 % (04/30/18 0725) Vital Signs (24h Range):  Temp:  [97.4 °F (36.3 °C)-98.1 °F (36.7 °C)] 98.1 °F (36.7 °C)  Pulse:  [71-96] 71  Resp:  [16-18] 18  SpO2:  [95 %-100 %] 100 %  BP: (141-199)/(63-73) 144/63     Weight: 91.6 kg (201 lb 15.1 oz)  Body mass index is 34.66 kg/m².     SpO2: 100 %  O2 Device (Oxygen Therapy): nasal cannula      Intake/Output Summary (Last 24 hours) at 04/30/18 1130  Last data filed at 04/30/18 0925   Gross per 24 hour   Intake              300 ml   Output              950 ml   Net             -650 ml       Lines/Drains/Airways     Peripheral Intravenous Line                 Peripheral IV - Single Lumen 04/26/18 0938 Left Wrist 4 days         Peripheral IV - Single Lumen 04/27/18 1325 Right Wrist 2 days                Physical Exam   Constitutional: She is oriented to person, place, and time. She appears well-developed and well-nourished. No distress.    HENT:   Head: Normocephalic and atraumatic.   Eyes: Pupils are equal, round, and reactive to light.   Neck: Neck supple. No JVD present.   Cardiovascular: Normal rate, regular rhythm, S1 normal, S2 normal and normal heart sounds.    No murmur heard.  Pulmonary/Chest:   Diminished BS at bases  Sternal incision C/D/I   Abdominal: Soft. Bowel sounds are normal. She exhibits no distension. There is no tenderness. There is no rebound.   Musculoskeletal: She exhibits no edema.   Neurological: She is alert and oriented to person, place, and time.   Skin: Skin is warm and dry. She is not diaphoretic. No erythema.   SVG site LLE; C/D/I; no drainage or erythema   Psychiatric: She has a normal mood and affect. Her behavior is normal. Thought content normal.   Nursing note and vitals reviewed.      Significant Labs:   CMP   Recent Labs  Lab 04/29/18  0354 04/30/18  0847    141   K 4.0 3.9   * 106   CO2 23 26   * 208*   BUN 18 19   CREATININE 1.1 1.0   CALCIUM 8.2* 8.5*   ANIONGAP 7* 9   ESTGFRAFRICA 54* >60   EGFRNONAA 47* 53*   , CBC   Recent Labs  Lab 04/29/18  0354 04/30/18  0847   WBC 11.22 9.30   HGB 9.8* 9.2*   HCT 29.5* 29.5*   * 111*   , Troponin No results for input(s): TROPONINI in the last 48 hours. and All pertinent lab results from the last 24 hours have been reviewed.    Significant Imaging: Echocardiogram:   2D echo with color flow doppler:   Results for orders placed or performed during the hospital encounter of 04/26/18   2D echo with color flow doppler   Result Value Ref Range    EF 60 55 - 65    Mitral Valve Regurgitation MILD     Est. PA Systolic Pressure 22.89     Mitral Valve Mobility NORMAL     Tricuspid Valve Regurgitation TRIVIAL     and X-Ray: CXR: X-Ray Chest 1 View (CXR): No results found for this visit on 04/26/18. and X-Ray Chest PA and Lateral (CXR): No results found for this visit on 04/26/18.    Assessment and Plan:   Recovering well s/p CABG. Needs to ambulate and  use IS. Continue current meds.     * CAD, multiple vessel    -Recovering S/p CABG  -Continue ASA, Plavix, BB  -Intolerant to statin  -Needs to use IS and ambulate            Stage 3 chronic kidney disease    Monitor        Unstable angina    -Patient underwent [facility] to evaluate symptoms.   -Severe multivessel CAD with Normal LVEF  -s/p CABG            VTE Risk Mitigation         Ordered     enoxaparin injection 40 mg  Daily      04/28/18 0902     IP VTE HIGH RISK PATIENT  Once      04/26/18 1446          Shama Sargent PA-C  Cardiology  [facility]    Chart reviewed. Dr. Alcaraz examined patient and agrees with plan as outlined above.

## 2018-04-30 NOTE — PLAN OF CARE
Problem: Patient Care Overview  Goal: Plan of Care Review  Patient currently requires max assist x 2 for bed mobility and transfers, as well as frequent cueing to follow sternal precautions.   Outcome: Ongoing (interventions implemented as appropriate)  Pt sleeping on/off over night. Sternal incisional pain controlled with prn medication. Midline incision dsg C/D/I. Left leg medial knee  incision continues to ooze with movement. Sternal precautions in use with cardiac pillow. NSR noted on tele monitor. HR= 70-80s. Insulin coverage given for elevated blood glucose. No falls or injury this shift. Bed low, sitter present at bedside.

## 2018-04-30 NOTE — PLAN OF CARE
Problem: Physical Therapy Goal  Goal: Physical Therapy Goal  LTGs to be met within 7 days (5/5/18):  1.  Patient will perform bed mobility with mod assist  2.  Patient will perform functional transfers with mod assist  3.  Patient will ambulate 100' without AD with min assist and no gross LOB  4.  Patient will verbalize all sternal precautions with 100% accuracy     Outcome: Ongoing (interventions implemented as appropriate)  PATIENT PARTICIPATE WELL WITH GT INTO HALLWAY AT CGA TO MIN ASSIST , MIN CUES FOR STERNAL PERCAUTIONS , GT AT 60'X 2. PATIENT DOES APPEAR CONFUSED AT TIMES.

## 2018-05-01 LAB
ANION GAP SERPL CALC-SCNC: 12 MMOL/L
BASOPHILS # BLD AUTO: 0 K/UL
BASOPHILS NFR BLD: 0 %
BUN SERPL-MCNC: 18 MG/DL
CALCIUM SERPL-MCNC: 8.3 MG/DL
CHLORIDE SERPL-SCNC: 107 MMOL/L
CO2 SERPL-SCNC: 23 MMOL/L
CREAT SERPL-MCNC: 0.9 MG/DL
DIFFERENTIAL METHOD: ABNORMAL
EOSINOPHIL # BLD AUTO: 0.3 K/UL
EOSINOPHIL NFR BLD: 3.3 %
ERYTHROCYTE [DISTWIDTH] IN BLOOD BY AUTOMATED COUNT: 18.3 %
EST. GFR  (AFRICAN AMERICAN): >60 ML/MIN/1.73 M^2
EST. GFR  (NON AFRICAN AMERICAN): 60 ML/MIN/1.73 M^2
GLUCOSE SERPL-MCNC: 103 MG/DL
HCT VFR BLD AUTO: 30.7 %
HGB BLD-MCNC: 10 G/DL
LYMPHOCYTES # BLD AUTO: 1.4 K/UL
LYMPHOCYTES NFR BLD: 13.9 %
MAGNESIUM SERPL-MCNC: 2.2 MG/DL
MCH RBC QN AUTO: 30.6 PG
MCHC RBC AUTO-ENTMCNC: 32.6 G/DL
MCV RBC AUTO: 94 FL
MONOCYTES # BLD AUTO: 0.9 K/UL
MONOCYTES NFR BLD: 9.3 %
NEUTROPHILS # BLD AUTO: 7.1 K/UL
NEUTROPHILS NFR BLD: 74 %
PLATELET # BLD AUTO: 136 K/UL
PMV BLD AUTO: 10.8 FL
POCT GLUCOSE: 102 MG/DL (ref 70–110)
POCT GLUCOSE: 109 MG/DL (ref 70–110)
POCT GLUCOSE: 129 MG/DL (ref 70–110)
POCT GLUCOSE: 95 MG/DL (ref 70–110)
POTASSIUM SERPL-SCNC: 4 MMOL/L
RBC # BLD AUTO: 3.27 M/UL
SODIUM SERPL-SCNC: 142 MMOL/L
WBC # BLD AUTO: 9.68 K/UL

## 2018-05-01 PROCEDURE — 97530 THERAPEUTIC ACTIVITIES: CPT

## 2018-05-01 PROCEDURE — 25000003 PHARM REV CODE 250: Performed by: PHYSICIAN ASSISTANT

## 2018-05-01 PROCEDURE — G8987 SELF CARE CURRENT STATUS: HCPCS | Mod: CL

## 2018-05-01 PROCEDURE — 63600175 PHARM REV CODE 636 W HCPCS: Performed by: NURSE PRACTITIONER

## 2018-05-01 PROCEDURE — 63600175 PHARM REV CODE 636 W HCPCS: Performed by: PHYSICIAN ASSISTANT

## 2018-05-01 PROCEDURE — 85025 COMPLETE CBC W/AUTO DIFF WBC: CPT

## 2018-05-01 PROCEDURE — 97166 OT EVAL MOD COMPLEX 45 MIN: CPT

## 2018-05-01 PROCEDURE — 36415 COLL VENOUS BLD VENIPUNCTURE: CPT

## 2018-05-01 PROCEDURE — 25000003 PHARM REV CODE 250: Performed by: THORACIC SURGERY (CARDIOTHORACIC VASCULAR SURGERY)

## 2018-05-01 PROCEDURE — 97110 THERAPEUTIC EXERCISES: CPT

## 2018-05-01 PROCEDURE — 97116 GAIT TRAINING THERAPY: CPT

## 2018-05-01 PROCEDURE — 63600175 PHARM REV CODE 636 W HCPCS: Performed by: FAMILY MEDICINE

## 2018-05-01 PROCEDURE — 80048 BASIC METABOLIC PNL TOTAL CA: CPT

## 2018-05-01 PROCEDURE — 99232 SBSQ HOSP IP/OBS MODERATE 35: CPT | Mod: ,,, | Performed by: INTERNAL MEDICINE

## 2018-05-01 PROCEDURE — G8988 SELF CARE GOAL STATUS: HCPCS | Mod: CJ

## 2018-05-01 PROCEDURE — 27000221 HC OXYGEN, UP TO 24 HOURS

## 2018-05-01 PROCEDURE — 63600175 PHARM REV CODE 636 W HCPCS: Performed by: INTERNAL MEDICINE

## 2018-05-01 PROCEDURE — 83735 ASSAY OF MAGNESIUM: CPT

## 2018-05-01 PROCEDURE — 21400001 HC TELEMETRY ROOM

## 2018-05-01 PROCEDURE — 97535 SELF CARE MNGMENT TRAINING: CPT

## 2018-05-01 PROCEDURE — 94761 N-INVAS EAR/PLS OXIMETRY MLT: CPT

## 2018-05-01 RX ORDER — FUROSEMIDE 10 MG/ML
20 INJECTION INTRAMUSCULAR; INTRAVENOUS 2 TIMES DAILY
Status: COMPLETED | OUTPATIENT
Start: 2018-05-01 | End: 2018-05-02

## 2018-05-01 RX ORDER — HYDRALAZINE HYDROCHLORIDE 20 MG/ML
10 INJECTION INTRAMUSCULAR; INTRAVENOUS EVERY 8 HOURS PRN
Status: DISCONTINUED | OUTPATIENT
Start: 2018-05-01 | End: 2018-05-02 | Stop reason: HOSPADM

## 2018-05-01 RX ADMIN — CHLORHEXIDINE GLUCONATE 10 ML: 1.2 RINSE ORAL at 08:05

## 2018-05-01 RX ADMIN — ACETAMINOPHEN 650 MG: 325 TABLET, FILM COATED ORAL at 05:05

## 2018-05-01 RX ADMIN — CLOPIDOGREL BISULFATE 75 MG: 75 TABLET ORAL at 08:05

## 2018-05-01 RX ADMIN — FAMOTIDINE 20 MG: 20 TABLET ORAL at 08:05

## 2018-05-01 RX ADMIN — INSULIN DETEMIR 20 UNITS: 100 INJECTION, SOLUTION SUBCUTANEOUS at 09:05

## 2018-05-01 RX ADMIN — ACETAMINOPHEN 650 MG: 325 TABLET, FILM COATED ORAL at 01:05

## 2018-05-01 RX ADMIN — LISINOPRIL 10 MG: 10 TABLET ORAL at 08:05

## 2018-05-01 RX ADMIN — METOPROLOL TARTRATE 50 MG: 50 TABLET ORAL at 09:05

## 2018-05-01 RX ADMIN — ENOXAPARIN SODIUM 40 MG: 100 INJECTION SUBCUTANEOUS at 05:05

## 2018-05-01 RX ADMIN — ASPIRIN 81 MG: 81 TABLET, COATED ORAL at 08:05

## 2018-05-01 RX ADMIN — POLYETHYLENE GLYCOL 3350 17 G: 17 POWDER, FOR SOLUTION ORAL at 08:05

## 2018-05-01 RX ADMIN — LEVOTHYROXINE SODIUM 50 MCG: 50 TABLET ORAL at 05:05

## 2018-05-01 RX ADMIN — FUROSEMIDE 20 MG: 10 INJECTION, SOLUTION INTRAVENOUS at 05:05

## 2018-05-01 RX ADMIN — DOCUSATE SODIUM 100 MG: 100 CAPSULE, LIQUID FILLED ORAL at 08:05

## 2018-05-01 RX ADMIN — DOCUSATE SODIUM 100 MG: 100 CAPSULE, LIQUID FILLED ORAL at 09:05

## 2018-05-01 RX ADMIN — CHLORHEXIDINE GLUCONATE 10 ML: 1.2 RINSE ORAL at 09:05

## 2018-05-01 RX ADMIN — FUROSEMIDE 20 MG: 10 INJECTION, SOLUTION INTRAVENOUS at 10:05

## 2018-05-01 RX ADMIN — METOPROLOL TARTRATE 50 MG: 50 TABLET ORAL at 08:05

## 2018-05-01 RX ADMIN — HYDRALAZINE HYDROCHLORIDE 10 MG: 20 INJECTION INTRAMUSCULAR; INTRAVENOUS at 05:05

## 2018-05-01 NOTE — PLAN OF CARE
Problem: Patient Care Overview  Goal: Plan of Care Review  Patient currently requires max assist x 2 for bed mobility and transfers, as well as frequent cueing to follow sternal precautions.   Outcome: Ongoing (interventions implemented as appropriate)  Pt on O2 tolerating well. No distress noted at this time.

## 2018-05-01 NOTE — PROGRESS NOTES
[facility] Hospital Medicine  Progress Note    Patient Name: Loida Holden  MRN: [id number]  Patient Class: IP- Inpatient   Admission Date: 4/26/2018  Length of Stay: 5 days  Attending Physician: Inocente Cam MD  Primary Care Provider: YAKELIN Richardson MD        Subjective:     Principal Problem:CAD, multiple vessel    HPI:  82F h/o CAD, T2DM, HLD, HTN and CVA presented with UA to cardiology clinic earlier this month.  Had LHC scheduled on 4/26 showing MVD.  Had CABG x 2 per CVT today.  Currently in ICU on Levophed and insulin gtt, mechanically ventilated.   Hospital medicine consulted for medical management.     Hospital Course:  4/28 - extubated this AM.  WBC slightly increase (rectionary). No typical CP or bleeding  4/29/2018 - mild hpt, no fever, urine output adequate, slight thrombocytopenia, creatinine slightly worse    01 May:  Ambulating well with therapy.  Orientation improved.  Tolerating regular diet.  Wounds clean and intact.    Interval History:  Expected surgical site pain.  Improvement to appetite.  Orientation and memory improving.    Review of Systems   Constitutional: Negative for activity change, appetite change, fever and unexpected weight change.   HENT: Negative for congestion, ear discharge and sore throat.    Eyes: Negative for visual disturbance.   Respiratory: Negative for cough and shortness of breath.    Cardiovascular: Positive for chest pain.        Post op   Gastrointestinal: Negative for abdominal pain, diarrhea, nausea and vomiting.   Endocrine: Negative for polyuria.   Genitourinary: Negative for dysuria.   Musculoskeletal: Negative for arthralgias and myalgias.   Skin: Negative for rash.   Allergic/Immunologic: Negative for immunocompromised state.   Neurological: Negative for dizziness, weakness, numbness and headaches.   Hematological: Negative for adenopathy.   Psychiatric/Behavioral: Negative for confusion and decreased concentration.   All other  systems reviewed and are negative.    Objective:     Vital Signs (Most Recent):  Temp: 97.8 °F (36.6 °C) (05/01/18 1625)  Pulse: 77 (05/01/18 1625)  Resp: 18 (05/01/18 1625)  BP: (!) 169/69 (05/01/18 1625)  SpO2: 95 % (05/01/18 1625) Vital Signs (24h Range):  Temp:  [97.4 °F (36.3 °C)-98.1 °F (36.7 °C)] 97.8 °F (36.6 °C)  Pulse:  [74-83] 77  Resp:  [16-18] 18  SpO2:  [95 %-100 %] 95 %  BP: (156-190)/(65-79) 169/69     Weight: 88.3 kg (194 lb 10.7 oz)  Body mass index is 33.41 kg/m².    Intake/Output Summary (Last 24 hours) at 05/01/18 1828  Last data filed at 05/01/18 1636   Gross per 24 hour   Intake              240 ml   Output             1300 ml   Net            -1060 ml      Physical Exam   Constitutional: She is sleeping. She is easily aroused. She appears ill.   HENT:   Head: Atraumatic.   Eyes: Conjunctivae are normal.   Neck: No JVD present. No tracheal deviation present.   Cardiovascular: Normal rate and regular rhythm.  Exam reveals distant heart sounds.    Pulses:       Radial pulses are 2+ on the right side, and 2+ on the left side.        Dorsalis pedis pulses are 2+ on the right side, and 2+ on the left side.   Pulmonary/Chest: Effort normal. No accessory muscle usage. No respiratory distress. She has decreased breath sounds. She has no wheezes. She has no rhonchi. She has no rales.   Shallow respiratory effort   Abdominal: Soft. She exhibits no distension. Bowel sounds are absent.   Musculoskeletal: She exhibits no edema.   Neurological: She is easily aroused.   Skin: Skin is warm and dry. Capillary refill takes less than 2 seconds.            Significant Labs: All pertinent labs within the past 24 hours have been reviewed.    Significant Imaging: I have reviewed all pertinent imaging results/findings within the past 24 hours.    Assessment/Plan:      * CAD, multiple vessel    - s/p CABG x 2 POD#0  - ROBERTO CARLOS drain and chest tube draining well  - still on mechanical ventilation, pressor support  - post op  management per CVT    4/28 - expected post op course, will start lovenox (VTE proph) this evening          Stage 3 chronic kidney disease    - stable  - monitor I/O  - replete electrolytes prn    4/28 - on lasix, will f/u UOP and creat  4/29 - creat slightly worse, c;inically not fluid overloaded, decreased lasix          Type 2 diabetes mellitus with hyperglycemia, without long-term current use of insulin    - hemoglobin a1c 9.6  - POC glucose >400  - was started on insulin gtt per primary  - will monitor accuchecks q1h, SSI PRN per protocol    4/29 - A1c not to goal, nutrition consulted            VTE Risk Mitigation         Ordered     enoxaparin injection 40 mg  Daily      04/28/18 0902     IP VTE HIGH RISK PATIENT  Once      04/26/18 1446              Dale Monae MD  Department of Hospital Medicine   [facility]

## 2018-05-01 NOTE — SUBJECTIVE & OBJECTIVE
Interval History:  Expected surgical site pain.  Improvement to appetite.  Remains intermittently disoriented.    Review of Systems   Constitutional: Negative for activity change, appetite change, fever and unexpected weight change.   HENT: Negative for congestion, ear discharge and sore throat.    Eyes: Negative for visual disturbance.   Respiratory: Negative for cough and shortness of breath.    Cardiovascular: Positive for chest pain.        Post op   Gastrointestinal: Negative for abdominal pain, diarrhea, nausea and vomiting.   Endocrine: Negative for polyuria.   Genitourinary: Negative for dysuria.   Musculoskeletal: Negative for arthralgias and myalgias.   Skin: Negative for rash.   Allergic/Immunologic: Negative for immunocompromised state.   Neurological: Negative for dizziness, weakness, numbness and headaches.   Hematological: Negative for adenopathy.   Psychiatric/Behavioral: Negative for confusion and decreased concentration.   All other systems reviewed and are negative.    Objective:     Vital Signs (Most Recent):  Temp: 98 °F (36.7 °C) (04/30/18 1740)  Pulse: 84 (04/30/18 1740)  Resp: 18 (04/30/18 1740)  BP: (!) 144/65 (04/30/18 1740)  SpO2: 97 % (04/30/18 1740) Vital Signs (24h Range):  Temp:  [97.7 °F (36.5 °C)-98.6 °F (37 °C)] 98 °F (36.7 °C)  Pulse:  [71-84] 84  Resp:  [16-18] 18  SpO2:  [95 %-100 %] 97 %  BP: (141-182)/(63-75) 144/65     Weight: 91.6 kg (201 lb 15.1 oz)  Body mass index is 34.66 kg/m².    Intake/Output Summary (Last 24 hours) at 04/30/18 2227  Last data filed at 04/30/18 1800   Gross per 24 hour   Intake              390 ml   Output             1300 ml   Net             -910 ml      Physical Exam   Constitutional: She is sleeping. She is easily aroused. She appears ill.   HENT:   Head: Atraumatic.   Eyes: Conjunctivae are normal.   Neck: No JVD present. No tracheal deviation present.   Cardiovascular: Normal rate and regular rhythm.  Exam reveals distant heart sounds.     Pulses:       Radial pulses are 2+ on the right side, and 2+ on the left side.        Dorsalis pedis pulses are 2+ on the right side, and 2+ on the left side.   Pulmonary/Chest: Effort normal. No accessory muscle usage. No respiratory distress. She has decreased breath sounds. She has no wheezes. She has no rhonchi. She has no rales.   Shallow respiratory effort   Abdominal: Soft. She exhibits no distension. Bowel sounds are absent.   Musculoskeletal: She exhibits no edema.   Neurological: She is easily aroused.   Skin: Skin is warm and dry. Capillary refill takes less than 2 seconds.            Significant Labs: All pertinent labs within the past 24 hours have been reviewed.    Significant Imaging: I have reviewed all pertinent imaging results/findings within the past 24 hours.

## 2018-05-01 NOTE — PROGRESS NOTES
POD  4 CAB with Dr. Cam    Ms. Holden is doing well, no complaints.    Incisional dressings removed. All incisions visualized and healing well. Small most inferior incision to left leg draining serosanguinous fluid.    Follow-up appointments with CVT scheduled for patient.    Plan:  Placement pending  Follow-up with CVT NP in 2 weeks and Dr. Cam in 4 weeks.

## 2018-05-01 NOTE — PT/OT/SLP PROGRESS
Physical Therapy  Treatment    Loida Holden   MRN: [id number]   Admitting Diagnosis: CAD, multiple vessel    PT Received On: 05/01/18  PT Start Time: 0750     PT Stop Time: 0815    PT Total Time (min): 25 min       Billable Minutes:  Gait Training 15 and Therapeutic Exercise 10    Treatment Type: Treatment  PT/PTA: PT     PTA Visit Number: 2       General Precautions: Standard, fall, sternal  Orthopedic Precautions: N/A   Braces: N/A         Subjective:  Communicated with NURSE VALENTINE AND EPIC CHART REVIEW  prior to session.   PT AGREED TO TX     Pain/Comfort  Pain Rating 1: 2/10  Location 1: chest  Pain Addressed 1: Reposition  Pain Rating Post-Intervention 1: 2/10    Objective:   Patient found with: telemetry, oxygen, peripheral IV    Functional Mobility:  PT MET IN  WITH ONE ON ONE PRESENT. PT SEATED IN CHAIR. PT STOOD WITH MIN A AND GT TRAINED WITH 2L O2 IN TOW. PT GT TRAINED TOTAL 200' WITH ONE SEATED REST BREAK. PT RETURNED TO RM T/F TO CHAIR WITH CGA. PT COMPLETED B LE TE X 15 REPS OF MIP, TKE, AND AP. PT RE-EDUCATED ON STERNAL PRECAUTIONS. PT STOOD FROM CHAIR WITH MIN A. PT T/F TO BEDSIDE AND SUP IN BED WITH MIN A. PT LEFT WITH ALL NEEDS MET AND BED ALARM ON .    AM-PAC 6 CLICK MOBILITY  How much help from another person does this patient currently need?   1 = Unable, Total/Dependent Assistance  2 = A lot, Maximum/Moderate Assistance  3 = A little, Minimum/Contact Guard/Supervision  4 = None, Modified [place]/Independent    Turning over in bed (including adjusting bedclothes, sheets and blankets)?: 3  Sitting down on and standing up from a chair with arms (e.g., wheelchair, bedside commode, etc.): 3  Moving from lying on back to sitting on the side of the bed?: 3  Moving to and from a bed to a chair (including a wheelchair)?: 3  Need to walk in hospital room?: 3  Climbing 3-5 steps with a railing?: 1  Total Score: 16    AM-PAC Raw Score CMS G-Code Modifier Level of Impairment Assistance   6  % Total / Unable   7 - 9 CM 80 - 100% Maximal Assist   10 - 14 CL 60 - 80% Moderate Assist   15 - 19 CK 40 - 60% Moderate Assist   20 - 22 CJ 20 - 40% Minimal Assist   23 CI 1-20% SBA / CGA   24 CH 0% Independent/ Mod I     Patient left supine with call button in reach.    Assessment:  PT PROGRESSING WITH GT AND REQUIRES INC CUES FOR STERNAL PRECAUTIONS.     Rehab identified problem list/impairments: Rehab identified problem list/impairments: weakness, impaired functional mobilty, gait instability, impaired self care skills, impaired endurance, impaired balance, decreased coordination, pain, decreased ROM, decreased safety awareness, decreased lower extremity function, decreased upper extremity function    Rehab potential is good.    Activity tolerance: Good    Discharge recommendations: Discharge Facility/Level Of Care Needs: home health PT, nursing facility, skilled     Barriers to discharge:      Equipment recommendations: Equipment Needed After Discharge: none     GOALS:    Physical Therapy Goals        Problem: Physical Therapy Goal    Goal Priority Disciplines Outcome Goal Variances Interventions   Physical Therapy Goal     PT/OT, PT Ongoing (interventions implemented as appropriate)     Description:  LTGs to be met within 7 days (5/5/18):  1.  Patient will perform bed mobility with mod assist  2.  Patient will perform functional transfers with mod assist  3.  Patient will ambulate 100' without AD with min assist and no gross LOB  4.  Patient will verbalize all sternal precautions with 100% accuracy                      PLAN:    Patient to be seen 5 x/week  to address the above listed problems via gait training, therapeutic activities, therapeutic exercises  Plan of Care expires: 05/05/18  Plan of Care reviewed with: patient         Carlyn Albaradoeduardo, PT  05/01/2018

## 2018-05-01 NOTE — SUBJECTIVE & OBJECTIVE
Interval History:  Expected surgical site pain.  Improvement to appetite.  Orientation and memory improving.    Review of Systems   Constitutional: Negative for activity change, appetite change, fever and unexpected weight change.   HENT: Negative for congestion, ear discharge and sore throat.    Eyes: Negative for visual disturbance.   Respiratory: Negative for cough and shortness of breath.    Cardiovascular: Positive for chest pain.        Post op   Gastrointestinal: Negative for abdominal pain, diarrhea, nausea and vomiting.   Endocrine: Negative for polyuria.   Genitourinary: Negative for dysuria.   Musculoskeletal: Negative for arthralgias and myalgias.   Skin: Negative for rash.   Allergic/Immunologic: Negative for immunocompromised state.   Neurological: Negative for dizziness, weakness, numbness and headaches.   Hematological: Negative for adenopathy.   Psychiatric/Behavioral: Negative for confusion and decreased concentration.   All other systems reviewed and are negative.    Objective:     Vital Signs (Most Recent):  Temp: 97.8 °F (36.6 °C) (05/01/18 1625)  Pulse: 77 (05/01/18 1625)  Resp: 18 (05/01/18 1625)  BP: (!) 169/69 (05/01/18 1625)  SpO2: 95 % (05/01/18 1625) Vital Signs (24h Range):  Temp:  [97.4 °F (36.3 °C)-98.1 °F (36.7 °C)] 97.8 °F (36.6 °C)  Pulse:  [74-83] 77  Resp:  [16-18] 18  SpO2:  [95 %-100 %] 95 %  BP: (156-190)/(65-79) 169/69     Weight: 88.3 kg (194 lb 10.7 oz)  Body mass index is 33.41 kg/m².    Intake/Output Summary (Last 24 hours) at 05/01/18 1828  Last data filed at 05/01/18 1636   Gross per 24 hour   Intake              240 ml   Output             1300 ml   Net            -1060 ml      Physical Exam   Constitutional: She is sleeping. She is easily aroused. She appears ill.   HENT:   Head: Atraumatic.   Eyes: Conjunctivae are normal.   Neck: No JVD present. No tracheal deviation present.   Cardiovascular: Normal rate and regular rhythm.  Exam reveals distant heart sounds.     Pulses:       Radial pulses are 2+ on the right side, and 2+ on the left side.        Dorsalis pedis pulses are 2+ on the right side, and 2+ on the left side.   Pulmonary/Chest: Effort normal. No accessory muscle usage. No respiratory distress. She has decreased breath sounds. She has no wheezes. She has no rhonchi. She has no rales.   Shallow respiratory effort   Abdominal: Soft. She exhibits no distension. Bowel sounds are absent.   Musculoskeletal: She exhibits no edema.   Neurological: She is easily aroused.   Skin: Skin is warm and dry. Capillary refill takes less than 2 seconds.            Significant Labs: All pertinent labs within the past 24 hours have been reviewed.    Significant Imaging: I have reviewed all pertinent imaging results/findings within the past 24 hours.

## 2018-05-01 NOTE — NURSING
Diabetes follow-up  Met with patient and son, Jose, for scheduled appointment  Patient fully oriented and answering questions appropriately  He has brought in her [employer] meter and she reports is aware of how to operate meter--has questions regarding obtaining blood ample from finger.  instructed in proper technique    Recommended she test 2-4 times daily as able, alternating pre-meals and recording on provided log sheets for PCP visits  Reviewed info on target glucose values. Hyper, hypoglycemia  Instructed in use of insulin pen to include  1.  Attaching pen needle/2 units safety shot  2.  Dialing dose  3.  Injection technique  4.  Site selection/rotation  5.  Insulin storage  Son does not feel need to practice and will oversee her injections  Answered multiple questions  Reviewed importance of low-fat and portion control meals  They verbalize understanding     Literature provided

## 2018-05-01 NOTE — PROGRESS NOTES
[facility] Hospital Medicine  Progress Note    Patient Name: Loida Holden  MRN: [id number]  Patient Class: IP- Inpatient   Admission Date: 4/26/2018  Length of Stay: 4 days  Attending Physician: Inocente Cam MD  Primary Care Provider: YAKELIN Richardson MD        Subjective:     Principal Problem:CAD, multiple vessel    HPI:  82F h/o CAD, T2DM, HLD, HTN and CVA presented with UA to cardiology clinic earlier this month.  Had LHC scheduled on 4/26 showing MVD.  Had CABG x 2 per CVT today.  Currently in ICU on Levophed and insulin gtt, mechanically ventilated.   Hospital medicine consulted for medical management.     Hospital Course:  4/28 - extubated this AM.  WBC slightly increase (rectionary). No typical CP or bleeding  4/29/2018 - mild hpt, no fever, urine output adequate, slight thrombocytopenia, creatinine slightly worse      Interval History:  Expected surgical site pain.  Improvement to appetite.  Remains intermittently disoriented.    Review of Systems   Constitutional: Negative for activity change, appetite change, fever and unexpected weight change.   HENT: Negative for congestion, ear discharge and sore throat.    Eyes: Negative for visual disturbance.   Respiratory: Negative for cough and shortness of breath.    Cardiovascular: Positive for chest pain.        Post op   Gastrointestinal: Negative for abdominal pain, diarrhea, nausea and vomiting.   Endocrine: Negative for polyuria.   Genitourinary: Negative for dysuria.   Musculoskeletal: Negative for arthralgias and myalgias.   Skin: Negative for rash.   Allergic/Immunologic: Negative for immunocompromised state.   Neurological: Negative for dizziness, weakness, numbness and headaches.   Hematological: Negative for adenopathy.   Psychiatric/Behavioral: Negative for confusion and decreased concentration.   All other systems reviewed and are negative.    Objective:     Vital Signs (Most Recent):  Temp: 98 °F (36.7 °C) (04/30/18  1740)  Pulse: 84 (04/30/18 1740)  Resp: 18 (04/30/18 1740)  BP: (!) 144/65 (04/30/18 1740)  SpO2: 97 % (04/30/18 1740) Vital Signs (24h Range):  Temp:  [97.7 °F (36.5 °C)-98.6 °F (37 °C)] 98 °F (36.7 °C)  Pulse:  [71-84] 84  Resp:  [16-18] 18  SpO2:  [95 %-100 %] 97 %  BP: (141-182)/(63-75) 144/65     Weight: 91.6 kg (201 lb 15.1 oz)  Body mass index is 34.66 kg/m².    Intake/Output Summary (Last 24 hours) at 04/30/18 2227  Last data filed at 04/30/18 1800   Gross per 24 hour   Intake              390 ml   Output             1300 ml   Net             -910 ml      Physical Exam   Constitutional: She is sleeping. She is easily aroused. She appears ill.   HENT:   Head: Atraumatic.   Eyes: Conjunctivae are normal.   Neck: No JVD present. No tracheal deviation present.   Cardiovascular: Normal rate and regular rhythm.  Exam reveals distant heart sounds.    Pulses:       Radial pulses are 2+ on the right side, and 2+ on the left side.        Dorsalis pedis pulses are 2+ on the right side, and 2+ on the left side.   Pulmonary/Chest: Effort normal. No accessory muscle usage. No respiratory distress. She has decreased breath sounds. She has no wheezes. She has no rhonchi. She has no rales.   Shallow respiratory effort   Abdominal: Soft. She exhibits no distension. Bowel sounds are absent.   Musculoskeletal: She exhibits no edema.   Neurological: She is easily aroused.   Skin: Skin is warm and dry. Capillary refill takes less than 2 seconds.            Significant Labs: All pertinent labs within the past 24 hours have been reviewed.    Significant Imaging: I have reviewed all pertinent imaging results/findings within the past 24 hours.    Assessment/Plan:      * CAD, multiple vessel    - s/p CABG x 2 POD#0  - ROBERTO CARLOS drain and chest tube draining well  - still on mechanical ventilation, pressor support  - post op management per CVT    4/28 - expected post op course, will start lovenox (VTE proph) this evening          Stage 3  chronic kidney disease    - stable  - monitor I/O  - replete electrolytes prn    4/28 - on lasix, will f/u UOP and creat  4/29 - creat slightly worse, c;inically not fluid overloaded, decreased lasix          Type 2 diabetes mellitus with hyperglycemia, without long-term current use of insulin    - hemoglobin a1c 9.6  - POC glucose >400  - was started on insulin gtt per primary  - will monitor accuchecks q1h, SSI PRN per protocol    4/29 - A1c not to goal, nutrition consulted            VTE Risk Mitigation         Ordered     enoxaparin injection 40 mg  Daily      04/28/18 0902     IP VTE HIGH RISK PATIENT  Once      04/26/18 1446              Dale Monae MD  Department of Hospital Medicine   [facility]

## 2018-05-01 NOTE — NURSING
Pt BP did not improve after initial dose of Lisinopril. BP now 190/76.On call notified. New orders pending.

## 2018-05-01 NOTE — ASSESSMENT & PLAN NOTE
-Recovering S/p CABG  -Continue ASA, Plavix, BB  -Intolerant to statin  -Needs to use IS and ambulate more  -IV Lasix re-started

## 2018-05-01 NOTE — PLAN OF CARE
Problem: Physical Therapy Goal  Goal: Physical Therapy Goal  LTGs to be met within 7 days (5/5/18):  1.  Patient will perform bed mobility with mod assist  2.  Patient will perform functional transfers with mod assist  3.  Patient will ambulate 100' without AD with min assist and no gross LOB  4.  Patient will verbalize all sternal precautions with 100% accuracy     Outcome: Ongoing (interventions implemented as appropriate)  PATIENT DID WELL WITH GT INTO HALLWAY 100'X2 HHAX1 , 2ND ASSIST FOR SAFETY, GOOD PACE , A LITTLE UNSTEADY AT TIMES, O2 AT 2L CONTIONUS ALL TX. MIN CUES FOR MAINTAINING STERNAL PERCAUTIONS.

## 2018-05-01 NOTE — PLAN OF CARE
Problem: Physical Therapy Goal  Goal: Physical Therapy Goal  LTGs to be met within 7 days (5/5/18):  1.  Patient will perform bed mobility with mod assist  2.  Patient will perform functional transfers with mod assist  3.  Patient will ambulate 100' without AD with min assist and no gross LOB  4.  Patient will verbalize all sternal precautions with 100% accuracy     Outcome: Ongoing (interventions implemented as appropriate)  PT PROGRESSING WITH GT 1X200' WITH CGA AND ONE SEATED REST BREAK.

## 2018-05-01 NOTE — PROGRESS NOTES
[facility]  Cardiology  Progress Note    Patient Name: Loida Holden  MRN: [id number]  Admission Date: 4/26/2018  Hospital Length of Stay: 5 days  Code Status: Full Code   Attending Physician: Inocente Cam MD   Primary Care Physician: YAKELIN Richardson MD  Expected Discharge Date:   Principal Problem:CAD, multiple vessel    Subjective:   HPI:  81 yo female with PMH CAD s/p PCI remote, and repeat LHC in 2012 nonobstructive, HTN, HLD, NIDDM, and h/o CVA. Echo in 2016 normal EF and LVH. Patient followed by [facility]. Seen in clinic earlier this month with complaints of lower sternal chest tightness for the past few months while walking to mailbox, resolved after resting. Also complained of chest pain after eating and associated with  dyspnea. Unable to do yard work due to pain. Patient underwent LHC today with Dr. Mukherjee and noted to have Severe multivessel CAD with Normal LVEF. CVT consulted for CABG     Hospital Course:   4/27/18- Patient post angiogram on yesterday that showed mutivessel CAD. CVT consulted for CABG. Planning for CABG x 2-3 today. Hypokalemia on morning labs. Vitals stable    4/28/18 - S/p CABG, doing well post op; with chest soreness, using IS; hemodynamically stable  4/29/18 - did well overnight, sitting in chair, eating tired but denied SOB, +chest soreness, will have chest tubes out and tx to tele    4/30/18-Patient seen and examined today, lying in bed. Complains of incisional pain and weakness. Labs stable. Needs to ambulate. No arrhythmias overnight.    5/1/18-Patients seen and examined today. Feels ok but complains of fatigue and weakness. No chest pain. Labs stable. CXR reviewed, needs to use IS. Lasix re-started.         Review of Systems   Constitution: Positive for weakness and malaise/fatigue.   HENT: Negative.    Eyes: Negative.    Cardiovascular: Positive for dyspnea on exertion.   Respiratory: Negative.    Endocrine: Negative.     Hematologic/Lymphatic: Negative.    Skin: Negative.    Musculoskeletal: Negative.    Gastrointestinal: Negative.    Genitourinary: Negative.    Psychiatric/Behavioral: Negative.      Objective:     Vital Signs (Most Recent):  Temp: 98.1 °F (36.7 °C) (05/01/18 0701)  Pulse: 78 (05/01/18 0840)  Resp: 18 (05/01/18 0840)  BP: (!) 156/65 (05/01/18 0701)  SpO2: 98 % (05/01/18 0840) Vital Signs (24h Range):  Temp:  [97.4 °F (36.3 °C)-98.6 °F (37 °C)] 98.1 °F (36.7 °C)  Pulse:  [74-84] 78  Resp:  [16-18] 18  SpO2:  [95 %-98 %] 98 %  BP: (144-190)/(65-79) 156/65     Weight: 88.3 kg (194 lb 10.7 oz)  Body mass index is 33.41 kg/m².     SpO2: 98 %  O2 Device (Oxygen Therapy): nasal cannula      Intake/Output Summary (Last 24 hours) at 05/01/18 1157  Last data filed at 05/01/18 1054   Gross per 24 hour   Intake              360 ml   Output             1100 ml   Net             -740 ml       Lines/Drains/Airways     Peripheral Intravenous Line                 Peripheral IV - Single Lumen 04/26/18 0938 Left Wrist 5 days                Physical Exam   Constitutional: She is oriented to person, place, and time. She appears well-developed and well-nourished. No distress.   HENT:   Head: Normocephalic and atraumatic.   Eyes: Pupils are equal, round, and reactive to light. Right eye exhibits no discharge. Left eye exhibits no discharge.   Neck: Neck supple. No JVD present.   Cardiovascular: Normal rate, regular rhythm, S1 normal, S2 normal and normal heart sounds.    No murmur heard.  Pulmonary/Chest: Effort normal.   Sternotomy site C/D/I; no bleeding or erythema  Diminished BS   Abdominal: Soft. She exhibits no distension. There is no tenderness. There is no rebound.   Musculoskeletal: She exhibits edema (1+ BLE).   Neurological: She is alert and oriented to person, place, and time.   Skin: Skin is warm and dry. She is not diaphoretic. No erythema.   Psychiatric: She has a normal mood and affect. Her behavior is normal.   Nursing  note and vitals reviewed.      Significant Labs:   CMP   Recent Labs  Lab 04/30/18  0847 05/01/18  0717    142   K 3.9 4.0    107   CO2 26 23   * 103   BUN 19 18   CREATININE 1.0 0.9   CALCIUM 8.5* 8.3*   ANIONGAP 9 12   ESTGFRAFRICA >60 >60   EGFRNONAA 53* 60   , CBC   Recent Labs  Lab 04/30/18  0847 05/01/18  0717   WBC 9.30 9.68   HGB 9.2* 10.0*   HCT 29.5* 30.7*   * 136*   , Troponin No results for input(s): TROPONINI in the last 48 hours. and All pertinent lab results from the last 24 hours have been reviewed.    Significant Imaging: Echocardiogram:   2D echo with color flow doppler:   Results for orders placed or performed during the hospital encounter of 04/26/18   2D echo with color flow doppler   Result Value Ref Range    EF 60 55 - 65    Mitral Valve Regurgitation MILD     Est. PA Systolic Pressure 22.89     Mitral Valve Mobility NORMAL     Tricuspid Valve Regurgitation TRIVIAL     and X-Ray: CXR: X-Ray Chest 1 View (CXR): No results found for this visit on 04/26/18.    Assessment and Plan:   Recovering s/p CABG. Needs to ambulate more and use IS. IV Lasix re-started. Continue other meds. Needs SNF placement.     * CAD, multiple vessel    -Recovering S/p CABG  -Continue ASA, Plavix, BB  -Intolerant to statin  -Needs to use IS and ambulate more  -IV Lasix re-started            Stage 3 chronic kidney disease    Monitor        Unstable angina    -Patient underwent [facility] to evaluate symptoms.   -Severe multivessel CAD with Normal LVEF  -s/p CABG            VTE Risk Mitigation         Ordered     enoxaparin injection 40 mg  Daily      04/28/18 0902     IP VTE HIGH RISK PATIENT  Once      04/26/18 1446          Shama Sargent PA-C  Cardiology  [facility]    Chart reviewed. Dr. Alcaraz examined patient and agrees with plan as outlined above.

## 2018-05-01 NOTE — PT/OT/SLP PROGRESS
Physical Therapy  Treatment    Loida Holden   MRN: [id number]   Admitting Diagnosis: CAD, multiple vessel    PT Received On: 05/01/18  PT Start Time: 1235     PT Stop Time: 1300    PT Total Time (min): 25 min       Billable Minutes:  Gait Training 15 and Therapeutic Activity 10    Treatment Type: Treatment  PT/PTA: PTA     PTA Visit Number: 3       General Precautions: Standard, fall, sternal  Orthopedic Precautions: N/A   Braces: N/A         Subjective:  Communicated with EPIC AND NURSESERGE prior to session.  PATIENT AGREE TO TX NOW.    Pain/Comfort  Pain Rating 1: 0/10    Objective:   Patient found with: telemetry, oxygen, peripheral IV    Functional Mobility:  Bed Mobility:    NOT ATTEMPTED THIS TX.    Transfers:   SIT TO STAND , STAND TO SIT WITH MIN CUES FOR MAINTAINING STERNAL PERCAUTIONS DURING T/FS.    Gait:    GT INTO HALLWAY 100'X2 , UNSTEADY AT TIMES , O2 AT 2L ALL TX.    Stairs:  N/A    Balance:   Static Sit: FAIR+: Able to take MINIMAL challenges from all directions  Dynamic Sit: FAIR+: Maintains balance through MINIMAL excursions of active trunk motion  Static Stand: POOR+: Needs MINIMAL assist to maintain  Dynamic stand: POOR: N/A     Therapeutic Activities and Exercises:  GT, T/FS OUT OF B/S CHAIR EMPHASIS ON MAINTIANING STERNAL PERCAUTIONS .    AM-PAC 6 CLICK MOBILITY  How much help from another person does this patient currently need?   1 = Unable, Total/Dependent Assistance  2 = A lot, Maximum/Moderate Assistance  3 = A little, Minimum/Contact Guard/Supervision  4 = None, Modified [place]/Independent    Sitting down on and standing up from a chair with arms (e.g., wheelchair, bedside commode, etc.): 3  Need to walk in hospital room?: 3  Climbing 3-5 steps with a railing?: 1    AM-PAC Raw Score CMS G-Code Modifier Level of Impairment Assistance   6 % Total / Unable   7 - 9 CM 80 - 100% Maximal Assist   10 - 14 CL 60 - 80% Moderate Assist   15 - 19 CK 40 - 60% Moderate  Assist   20 - 22 CJ 20 - 40% Minimal Assist   23 CI 1-20% SBA / CGA   24 CH 0% Independent/ Mod I     Patient left up in chair with all lines intact and call button in reach, SITTER PRESENT AS WELL AS FAMILY .    Assessment:  Loida Holden is a 82 y.o. female with a medical diagnosis OF CAD.    Rehab identified problem list/impairments: Rehab identified problem list/impairments: weakness, impaired self care skills, impaired balance, impaired endurance, impaired functional mobilty, gait instability, decreased safety awareness    Rehab potential is excellent.    Activity tolerance: Excellent    Discharge recommendations: Discharge Facility/Level Of Care Needs: home health PT, nursing facility, skilled     Barriers to discharge:      Equipment recommendations: Equipment Needed After Discharge: none     GOALS:    Physical Therapy Goals        Problem: Physical Therapy Goal    Goal Priority Disciplines Outcome Goal Variances Interventions   Physical Therapy Goal     PT/OT, PT Ongoing (interventions implemented as appropriate)     Description:  LTGs to be met within 7 days (5/5/18):  1.  Patient will perform bed mobility with mod assist  2.  Patient will perform functional transfers with mod assist  3.  Patient will ambulate 100' without AD with min assist and no gross LOB  4.  Patient will verbalize all sternal precautions with 100% accuracy                      PLAN:    Patient to be seen 5 x/week  to address the above listed problems via gait training, therapeutic activities, therapeutic exercises  Plan of Care expires: 05/05/18  Plan of Care reviewed with: patient         Myla Ly, PTA  05/01/2018

## 2018-05-01 NOTE — PROGRESS NOTES
Pharmacist Intervention IV to PO Note    Loida Holden is a 82 y.o. female being treated with IV medication     Patient Data:    Vital Signs (Most Recent):  Temp: 97.8 °F (36.6 °C) (04/30/18 1932)  Pulse: 87 (04/30/18 1932)  Resp: 18 (04/30/18 1932)  BP: (!) 163/68 (04/30/18 1932)  SpO2: 96 % (04/30/18 1932)   Vital Signs (72h Range):  Temp:  [97.4 °F (36.3 °C)-99.8 °F (37.7 °C)]   Pulse:  [62-96]   Resp:  [14-29]   BP: ()/()   SpO2:  [92 %-100 %]      CBC:    Recent Labs     Lab 04/28/18  0400 04/29/18  0354 04/30/18  0847   WBC 14.84* 11.22 9.30   RBC 3.42* 3.27* 3.15*   HGB 10.3* 9.8* 9.2*   HCT 30.4* 29.5* 29.5*    121* 111*   MCV 89 90 94   MCH 30.1 30.0 29.2   MCHC 33.9 33.2 31.2*     CMP:     Recent Labs     Lab 04/27/18  0450  04/28/18  0400 04/29/18  0354 04/30/18  0847   * < > 99 122* 208*   CALCIUM 8.5* < > 7.9* 8.2* 8.5*   ALBUMIN 3.1* --  --  --  --    PROT 5.8* --  --  --  --     < > 143 141 141   K 3.1* < > 3.3* 4.0 3.9   CO2 25 < > 24 23 26    < > 111* 111* 106   BUN 20 < > 16 18 19   CREATININE 1.1 < > 0.9 1.1 1.0   ALKPHOS 90 --  --  --  --    ALT 30 --  --  --  --    AST 33 --  --  --  --    BILITOT 0.4 --  --  --  --    < > = values in this interval not displayed.       Dietary Orders:  Diet Orders            Diet Cardiac [facility]: Cardiac starting at 04/28 1124            Based on the following criteria, this patient qualifies for intravenous to oral conversion:  [x] The patients gastrointestinal tract is functioning (tolerating medications via oral or enteral route for 24 hours and tolerating food or enteral feeds for 24 hours).  [x] The patient is hemodynamically stable for 24 hours (heart rate <100 beats per minute, systolic blood pressure >99 mm Hg, and respiratory rate <20 breaths per minute).  [x] The patient shows clinical improvement (afebrile for at least 24 hours and white blood cell count downtrending or normalized).  Additionally, the patient must be non-neutropenic (absolute neutrophil count >500 cells/mm3).  [x] For antimicrobials, the patient has received IV therapy for at least 24 hours.    IV medication famotidine will be changed to oral medication : famotidine 20mg daily    Pharmacist's Name: Karri Lopez  Pharmacist's Extension: [phone]

## 2018-05-01 NOTE — PLAN OF CARE
Problem: Patient Care Overview  Goal: Plan of Care Review  Patient currently requires max assist x 2 for bed mobility and transfers, as well as frequent cueing to follow sternal precautions.   Outcome: Ongoing (interventions implemented as appropriate)  Plan of care reviewed with patient. Patient stable. Alert but disoriented at times. Patient free from falls fall precautions in place. Assist x 1 to the bathroom. Call be;; and belongings with in reach reminded to call for assistance. Ambulated in the pack. Seen by pt and ot. Blood glucose monitoring. Up in chair. No complaints of pain at this time. NSR on monitor. Sternal precautions maintained. Will cont to monitor.

## 2018-05-01 NOTE — PLAN OF CARE
General: Problem: Patient Care Overview  Goal: Plan of Care Review  Patient currently requires max assist x 2 for bed mobility and transfers, as well as frequent cueing to follow sternal precautions.   Outcome: Ongoing (interventions implemented as appropriate)  Pt more AAO than the night before, no confusion noted.  Mild incisional pain controlled with Tylenol.  NSR noted on tele monitor.  Sternal precautions in place.  No falls or injury, sitter present in room,safety maintained.  POC discussed with pt, questions answered.

## 2018-05-01 NOTE — SUBJECTIVE & OBJECTIVE
Review of Systems   Constitution: Positive for weakness and malaise/fatigue.   HENT: Negative.    Eyes: Negative.    Cardiovascular: Positive for dyspnea on exertion.   Respiratory: Negative.    Endocrine: Negative.    Hematologic/Lymphatic: Negative.    Skin: Negative.    Musculoskeletal: Negative.    Gastrointestinal: Negative.    Genitourinary: Negative.    Psychiatric/Behavioral: Negative.      Objective:     Vital Signs (Most Recent):  Temp: 98.1 °F (36.7 °C) (05/01/18 0701)  Pulse: 78 (05/01/18 0840)  Resp: 18 (05/01/18 0840)  BP: (!) 156/65 (05/01/18 0701)  SpO2: 98 % (05/01/18 0840) Vital Signs (24h Range):  Temp:  [97.4 °F (36.3 °C)-98.6 °F (37 °C)] 98.1 °F (36.7 °C)  Pulse:  [74-84] 78  Resp:  [16-18] 18  SpO2:  [95 %-98 %] 98 %  BP: (144-190)/(65-79) 156/65     Weight: 88.3 kg (194 lb 10.7 oz)  Body mass index is 33.41 kg/m².     SpO2: 98 %  O2 Device (Oxygen Therapy): nasal cannula      Intake/Output Summary (Last 24 hours) at 05/01/18 1157  Last data filed at 05/01/18 1054   Gross per 24 hour   Intake              360 ml   Output             1100 ml   Net             -740 ml       Lines/Drains/Airways     Peripheral Intravenous Line                 Peripheral IV - Single Lumen 04/26/18 0938 Left Wrist 5 days                Physical Exam   Constitutional: She is oriented to person, place, and time. She appears well-developed and well-nourished. No distress.   HENT:   Head: Normocephalic and atraumatic.   Eyes: Pupils are equal, round, and reactive to light. Right eye exhibits no discharge. Left eye exhibits no discharge.   Neck: Neck supple. No JVD present.   Cardiovascular: Normal rate, regular rhythm, S1 normal, S2 normal and normal heart sounds.    No murmur heard.  Pulmonary/Chest: Effort normal.   Sternotomy site C/D/I; no bleeding or erythema  Diminished BS   Abdominal: Soft. She exhibits no distension. There is no tenderness. There is no rebound.   Musculoskeletal: She exhibits edema (1+ BLE).    Neurological: She is alert and oriented to person, place, and time.   Skin: Skin is warm and dry. She is not diaphoretic. No erythema.   Psychiatric: She has a normal mood and affect. Her behavior is normal.   Nursing note and vitals reviewed.      Significant Labs:   CMP   Recent Labs  Lab 04/30/18  0847 05/01/18  0717    142   K 3.9 4.0    107   CO2 26 23   * 103   BUN 19 18   CREATININE 1.0 0.9   CALCIUM 8.5* 8.3*   ANIONGAP 9 12   ESTGFRAFRICA >60 >60   EGFRNONAA 53* 60   , CBC   Recent Labs  Lab 04/30/18  0847 05/01/18  0717   WBC 9.30 9.68   HGB 9.2* 10.0*   HCT 29.5* 30.7*   * 136*   , Troponin No results for input(s): TROPONINI in the last 48 hours. and All pertinent lab results from the last 24 hours have been reviewed.    Significant Imaging: Echocardiogram:   2D echo with color flow doppler:   Results for orders placed or performed during the hospital encounter of 04/26/18   2D echo with color flow doppler   Result Value Ref Range    EF 60 55 - 65    Mitral Valve Regurgitation MILD     Est. PA Systolic Pressure 22.89     Mitral Valve Mobility NORMAL     Tricuspid Valve Regurgitation TRIVIAL     and X-Ray: CXR: X-Ray Chest 1 View (CXR): No results found for this visit on 04/26/18.

## 2018-05-01 NOTE — PT/OT/SLP EVAL
Occupational Therapy   Evaluation    Name: Loida Holden  MRN: [id number]  Admitting Diagnosis:  CAD, multiple vessel 4 Days Post-Op    Recommendations:     Discharge Recommendations: nursing facility, skilled  Discharge Equipment Recommendations:  none  Barriers to discharge:       History:     Occupational Profile:  Living Environment: lives alone in 1 story house with no steps to enter  Previous level of function: (i) with adl's and functional mobility  Roles and Routines: occupational therapy  Equipment Owned:  cane, straight, crutches, rollator  Assistance upon Discharge:     Past Medical History:   Diagnosis Date    Angina pectoris     Anxiety     Arthritis     neck, back,     Brain cyst     x2    Colon polyp     Coronary artery disease     s/p PTCA    Diabetes mellitus type 2 without retinopathy     Diabetes type 2, controlled 2/17/2016    Diverticulitis of intestine without hemorrhage 11/10/2015    Dry senile macular degeneration     Ex-smoker 11/10/2015    Fatty liver     Gastritis     Gout     Herpes genitalia     Hiatal hernia     History of shingles     Hx of colonic polyps     8/21/09    Hyperlipidemia     Hypertension     Hypothyroid     Meningioma     6/14 mri dr cuevas    Obesity (BMI 30.0-34.9) 12/2/2015    Osteopenia 4/15 klever 4/17    Pneumonia     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 hx of seizures    Stroke     Tobacco dependence     Type 2 diabetes mellitus with stage 3 chronic kidney disease, without long-term current use of insulin     Type 2 diabetes mellitus without complication        Past Surgical History:   Procedure Laterality Date    CARDIAC CATHETERIZATION      [facility] 2-MRI safe to 3T    CATARACT EXTRACTION W/  INTRAOCULAR LENS IMPLANT  OD w/YAG    CATARACT  EXTRACTION W/  INTRAOCULAR LENS IMPLANT  OS w/YAG    CHOLECYSTECTOMY      2/2012    CORONARY ANGIOPLASTY      HYSTERECTOMY  1970s       Subjective     Chief Complaint: debility and generalized weakness  Patient/Family stated goals:   Communicated with: nurse meli and [facility] chart review prior to session.  Pain/Comfort:  · Location - Side 1: Bilateral  · Location - Orientation 1: lower  · Location 1: abdomen    Patients cultural, spiritual, Mandaeism conflicts given the current situation:      Objective:     Patient found with: telemetry, oxygen, peripheral IV    General Precautions: Standard, fall, sternal   Orthopedic Precautions:N/A   Braces: N/A     Occupational Performance:    Bed Mobility:    · Patient completed Rolling/Turning to Right with minimum assistance  · Patient completed Scooting/Bridging with minimum assistance  · Patient completed Supine to Sit with minimum assistance    Functional Mobility/Transfers:  · Patient completed Sit <> Stand Transfer with minimum assistance  with  no assistive device   · Functional Mobility: pt req min a with functional t/f's    Activities of Daily Living:  · UB Dressing: maximal assistance x1  · LB Dressing: maximal assistance x1    Cognitive/Visual Perceptual:  Cognitive/Psychosocial Skills:     -       Oriented to: Person, Place, Time and Situation   -       Follows Commands/attention:Follows multistep  commands  -       Communication: clear/fluent  -       Memory: No Deficits noted  -       Safety awareness/insight to disability: impaired   Visual/Perceptual:      -Intact    Physical Exam:  Upper Extremity Range of Motion:     -       Right Upper Extremity: WFL OF STERNAL PRECAUTIONS  -       Left Upper Extremity: wfl of sternal precautions  Upper Extremity Strength:    -       Right Upper Extremity: not tested  -       Left Upper Extremity: not tested   Strength:    -       Right Upper Extremity: not tested  -       Left Upper Extremity: not tested    Patient  "left HOB elevated with all lines intact, call button in reach,  asha CHO notified and ASHA CHO PCP present    [facility] 6 Click:  [facility] Total Score: 16    Treatment & Education:    Education:    Assessment:     Loida Holden is a 82 y.o. female with a medical diagnosis of CAD, multiple vessel.  She presents with Performance deficits affecting function are weakness, impaired functional mobilty, decreased safety awareness, impaired endurance, gait instability, impaired balance, decreased upper extremity function, impaired skin, impaired self care skills, decreased lower extremity function.      Rehab Prognosis:  FAIR+; patient would benefit from acute skilled OT services to address these deficits and reach maximum level of function.         Clinical Decision Makin.  OT Low:  "Pt evaluation falls under low complexity for evaluation coding due to performance deficits noted in 1-3 areas as stated above and 0 co-morbities affecting current functional status. Data obtained from problem focused assessments. No modifications or assistance was required for completion of evaluation. Only brief occupational profile and history review completed."     Plan:     Patient to be seen 3 x/week to address the above listed problems via self-care/home management, therapeutic activities, therapeutic exercises  · Plan of Care Expires: 18  · Plan of Care Reviewed with: patient    This Plan of care has been discussed with the patient who was involved in its development and understands and is in agreement with the identified goals and treatment plan    GOALS:    Occupational Therapy Goals        Problem: Occupational Therapy Goal    Goal Priority Disciplines Outcome Interventions   Occupational Therapy Goal     OT, PT/OT     Description:  Goals to be met by: 18     Patient will increase functional independence with ADLs by performing:    UE Dressing with Stand-by Assistance.  LE Dressing with Stand-by " Assistance.  Toilet transfer to toilet with Stand-by Assistance.                      Time Tracking:     OT Date of Treatment: 05/01/18  OT Start Time: 1440  OT Stop Time: 1505  OT Total Time (min): 25 min    Billable Minutes:Evaluation 10 MINUTES  Therapeutic Activity 15 MINUTES    Jackie Martinez, OT  5/1/2018

## 2018-05-02 VITALS
OXYGEN SATURATION: 97 % | HEIGHT: 64 IN | DIASTOLIC BLOOD PRESSURE: 70 MMHG | HEART RATE: 78 BPM | TEMPERATURE: 98 F | SYSTOLIC BLOOD PRESSURE: 143 MMHG | RESPIRATION RATE: 18 BRPM | BODY MASS INDEX: 32.18 KG/M2 | WEIGHT: 188.5 LBS

## 2018-05-02 PROBLEM — R09.02 HYPOXIA: Status: RESOLVED | Noted: 2018-04-28 | Resolved: 2018-05-02

## 2018-05-02 LAB
MAGNESIUM SERPL-MCNC: 2.3 MG/DL
POCT GLUCOSE: 173 MG/DL (ref 70–110)
POCT GLUCOSE: 203 MG/DL (ref 70–110)
POCT GLUCOSE: 91 MG/DL (ref 70–110)

## 2018-05-02 PROCEDURE — 36415 COLL VENOUS BLD VENIPUNCTURE: CPT

## 2018-05-02 PROCEDURE — 94799 UNLISTED PULMONARY SVC/PX: CPT

## 2018-05-02 PROCEDURE — 25000003 PHARM REV CODE 250: Performed by: PHYSICIAN ASSISTANT

## 2018-05-02 PROCEDURE — 25000003 PHARM REV CODE 250: Performed by: THORACIC SURGERY (CARDIOTHORACIC VASCULAR SURGERY)

## 2018-05-02 PROCEDURE — 94761 N-INVAS EAR/PLS OXIMETRY MLT: CPT

## 2018-05-02 PROCEDURE — 97116 GAIT TRAINING THERAPY: CPT

## 2018-05-02 PROCEDURE — 63600175 PHARM REV CODE 636 W HCPCS: Performed by: INTERNAL MEDICINE

## 2018-05-02 PROCEDURE — 99232 SBSQ HOSP IP/OBS MODERATE 35: CPT | Mod: ,,, | Performed by: INTERNAL MEDICINE

## 2018-05-02 PROCEDURE — 63600175 PHARM REV CODE 636 W HCPCS: Performed by: FAMILY MEDICINE

## 2018-05-02 PROCEDURE — 97110 THERAPEUTIC EXERCISES: CPT

## 2018-05-02 PROCEDURE — 83735 ASSAY OF MAGNESIUM: CPT

## 2018-05-02 RX ORDER — OXYCODONE HYDROCHLORIDE 5 MG/1
5 TABLET ORAL EVERY 4 HOURS PRN
Qty: 15 TABLET | Refills: 0 | Status: SHIPPED | OUTPATIENT
Start: 2018-05-02

## 2018-05-02 RX ORDER — ASPIRIN 81 MG/1
81 TABLET ORAL DAILY
Refills: 0 | COMMUNITY
Start: 2018-05-03 | End: 2018-06-14 | Stop reason: SINTOL

## 2018-05-02 RX ADMIN — DOCUSATE SODIUM 100 MG: 100 CAPSULE, LIQUID FILLED ORAL at 09:05

## 2018-05-02 RX ADMIN — CLOPIDOGREL BISULFATE 75 MG: 75 TABLET ORAL at 09:05

## 2018-05-02 RX ADMIN — ENOXAPARIN SODIUM 40 MG: 100 INJECTION SUBCUTANEOUS at 06:05

## 2018-05-02 RX ADMIN — METOPROLOL TARTRATE 50 MG: 50 TABLET ORAL at 09:05

## 2018-05-02 RX ADMIN — INSULIN ASPART 4 UNITS: 100 INJECTION, SOLUTION INTRAVENOUS; SUBCUTANEOUS at 06:05

## 2018-05-02 RX ADMIN — INSULIN ASPART 2 UNITS: 100 INJECTION, SOLUTION INTRAVENOUS; SUBCUTANEOUS at 12:05

## 2018-05-02 RX ADMIN — FUROSEMIDE 20 MG: 10 INJECTION, SOLUTION INTRAVENOUS at 09:05

## 2018-05-02 RX ADMIN — CHLORHEXIDINE GLUCONATE 10 ML: 1.2 RINSE ORAL at 09:05

## 2018-05-02 RX ADMIN — ASPIRIN 81 MG: 81 TABLET, COATED ORAL at 09:05

## 2018-05-02 RX ADMIN — POLYETHYLENE GLYCOL 3350 17 G: 17 POWDER, FOR SOLUTION ORAL at 09:05

## 2018-05-02 RX ADMIN — LISINOPRIL 10 MG: 10 TABLET ORAL at 09:05

## 2018-05-02 RX ADMIN — ACETAMINOPHEN 650 MG: 325 TABLET, FILM COATED ORAL at 06:05

## 2018-05-02 RX ADMIN — LEVOTHYROXINE SODIUM 50 MCG: 50 TABLET ORAL at 06:05

## 2018-05-02 RX ADMIN — INSULIN DETEMIR 20 UNITS: 100 INJECTION, SOLUTION SUBCUTANEOUS at 09:05

## 2018-05-02 RX ADMIN — FUROSEMIDE 20 MG: 10 INJECTION, SOLUTION INTRAVENOUS at 06:05

## 2018-05-02 NOTE — PT/OT/SLP PROGRESS
Physical Therapy  Treatment    oLida Holden   MRN: [id number]   Admitting Diagnosis: CAD, multiple vessel    PT Received On: 05/02/18  PT Start Time: 1045     PT Stop Time: 1110    PT Total Time (min): 25 min       Billable Minutes:  Gait Training 15 and Therapeutic Exercise 10    Treatment Type: Treatment  PT/PTA: PT     PTA Visit Number: 3       General Precautions: Standard, fall, sternal  Orthopedic Precautions: N/A   Braces: N/A         Subjective:  Communicated with nurse TASHANTA AND EPIC CHART REVIEW  prior to session.  PT AGREED TO TX    Pain/Comfort  Pain Rating 1: 0/10  Pain Rating Post-Intervention 1: 0/10    Objective:   Patient found with: peripheral IV, telemetry    Functional Mobility:  PT SEATED IN CHAIR AND STOOD WITH MOMENTUM AND MIN A FOR GT WITH CGA ON LEVEL INDOOR SURFACES X 210' WITH NO O2 IN TOW. PT RETURNED TO RM SOB HOWEVER O2 SATS @ 96% ON RM AIR. PT SEATED FOR REST AND COMPLETED B LE TE X 20 REPS OF AP, TKE, AND MIP. PT LEFT SEATED AND RE-EDUCATED ON STERNAL PRECAUTIONS. PT WITH AL NEEDS MET.     AM-PAC 6 CLICK MOBILITY  How much help from another person does this patient currently need?   1 = Unable, Total/Dependent Assistance  2 = A lot, Maximum/Moderate Assistance  3 = A little, Minimum/Contact Guard/Supervision  4 = None, Modified [place]/Independent    Turning over in bed (including adjusting bedclothes, sheets and blankets)?: 3  Sitting down on and standing up from a chair with arms (e.g., wheelchair, bedside commode, etc.): 3  Moving from lying on back to sitting on the side of the bed?: 3  Moving to and from a bed to a chair (including a wheelchair)?: 3  Need to walk in hospital room?: 3  Climbing 3-5 steps with a railing?: 1  Total Score: 16    AM-PAC Raw Score CMS G-Code Modifier Level of Impairment Assistance   6 % Total / Unable   7 - 9 CM 80 - 100% Maximal Assist   10 - 14 CL 60 - 80% Moderate Assist   15 - 19 CK 40 - 60% Moderate Assist   20 - 22 CJ 20  - 40% Minimal Assist   23 CI 1-20% SBA / CGA   24 CH 0% Independent/ Mod I     Patient left up in chair with call button in reach.    Assessment:  PT PROGRESSING WITH GT AND INC ENDURANCE TODAY. PT WILL CONT TO BENEFIT FROM P.T. TO ADDRESS MOBILITY IMPAIRMENTS.     Rehab identified problem list/impairments: Rehab identified problem list/impairments: weakness, impaired endurance, impaired functional mobilty, impaired cardiopulmonary response to activity, impaired balance, decreased safety awareness, decreased upper extremity function, decreased lower extremity function, gait instability, impaired self care skills, decreased ROM    Rehab potential is excellent.    Activity tolerance: Good    Discharge recommendations: Discharge Facility/Level Of Care Needs: nursing facility, skilled     Barriers to discharge:      Equipment recommendations: Equipment Needed After Discharge: none     GOALS:    Physical Therapy Goals        Problem: Physical Therapy Goal    Goal Priority Disciplines Outcome Goal Variances Interventions   Physical Therapy Goal     PT/OT, PT Ongoing (interventions implemented as appropriate)     Description:  LTGs to be met within 7 days (5/5/18):  1.  Patient will perform bed mobility with mod assist  2.  Patient will perform functional transfers with mod assist  3.  Patient will ambulate 100' without AD with min assist and no gross LOB  4.  Patient will verbalize all sternal precautions with 100% accuracy                      PLAN:    Patient to be seen 5 x/week  to address the above listed problems via gait training, therapeutic activities, therapeutic exercises  Plan of Care expires: 05/05/18  Plan of Care reviewed with: patient         Carlyn Catrachita, PT  05/02/2018

## 2018-05-02 NOTE — NURSING
Patient picked up by [facility] with belongings and cardiac pillow. Paperwork with prescription given to transport.

## 2018-05-02 NOTE — SUBJECTIVE & OBJECTIVE
Review of Systems   Constitution: Positive for malaise/fatigue.   HENT: Negative.    Eyes: Negative.    Cardiovascular: Positive for dyspnea on exertion and leg swelling.   Respiratory: Positive for shortness of breath.    Endocrine: Negative.    Hematologic/Lymphatic: Negative.    Skin: Negative.    Musculoskeletal: Negative.    Gastrointestinal: Negative.    Genitourinary: Negative.    Neurological: Negative.    Psychiatric/Behavioral: Negative.    Allergic/Immunologic: Negative.      Objective:     Vital Signs (Most Recent):  Temp: 96.8 °F (36 °C) (05/02/18 0936)  Pulse: 93 (05/02/18 0936)  Resp: 20 (05/02/18 0936)  BP: (!) 161/72 (05/02/18 0936)  SpO2: 97 % (05/02/18 0936) Vital Signs (24h Range):  Temp:  [96.8 °F (36 °C)-98.8 °F (37.1 °C)] 96.8 °F (36 °C)  Pulse:  [67-93] 93  Resp:  [18-20] 20  SpO2:  [95 %-100 %] 97 %  BP: (145-187)/(65-79) 161/72     Weight: 85.5 kg (188 lb 7.9 oz)  Body mass index is 32.35 kg/m².     SpO2: 97 %  O2 Device (Oxygen Therapy): room air      Intake/Output Summary (Last 24 hours) at 05/02/18 1107  Last data filed at 05/02/18 0834   Gross per 24 hour   Intake              360 ml   Output             1950 ml   Net            -1590 ml       Lines/Drains/Airways     Peripheral Intravenous Line                 Peripheral IV - Single Lumen 04/26/18 0938 Left Wrist 6 days                Physical Exam   Constitutional: She is oriented to person, place, and time. She appears well-developed and well-nourished. No distress.   HENT:   Head: Normocephalic and atraumatic.   Eyes: Pupils are equal, round, and reactive to light. Right eye exhibits no discharge. Left eye exhibits no discharge.   Neck: Neck supple. No JVD present.   Cardiovascular: Normal rate, regular rhythm, S1 normal, S2 normal and normal heart sounds.    No murmur heard.  Pulmonary/Chest: Effort normal and breath sounds normal.   CABG site C/D/I; no drainage or erythema  Mildly decreased BS at bases   Abdominal: Soft. She  exhibits no distension. There is no tenderness. There is no rebound.   Musculoskeletal: She exhibits edema (BLE).   Neurological: She is alert and oriented to person, place, and time.   Skin: Skin is warm and dry. She is not diaphoretic. No erythema.   +moderate bruising left leg   Psychiatric: She has a normal mood and affect. Her behavior is normal. Thought content normal.   Nursing note and vitals reviewed.      Significant Labs:   CMP   Recent Labs  Lab 05/01/18  0717      K 4.0      CO2 23      BUN 18   CREATININE 0.9   CALCIUM 8.3*   ANIONGAP 12   ESTGFRAFRICA >60   EGFRNONAA 60   , CBC   Recent Labs  Lab 05/01/18  0717   WBC 9.68   HGB 10.0*   HCT 30.7*   *   , Troponin No results for input(s): TROPONINI in the last 48 hours. and All pertinent lab results from the last 24 hours have been reviewed.    Significant Imaging: Echocardiogram:   2D echo with color flow doppler:   Results for orders placed or performed during the hospital encounter of 04/26/18   2D echo with color flow doppler   Result Value Ref Range    EF 60 55 - 65    Mitral Valve Regurgitation MILD     Est. PA Systolic Pressure 22.89     Mitral Valve Mobility NORMAL     Tricuspid Valve Regurgitation TRIVIAL     and X-Ray: CXR: X-Ray Chest 1 View (CXR): No results found for this visit on 04/26/18. and X-Ray Chest PA and Lateral (CXR): No results found for this visit on 04/26/18.

## 2018-05-02 NOTE — DISCHARGE SUMMARY
[facility] Hospital Medicine  Discharge Summary      Patient Name: Loida Holden  MRN: [id number]  Admission Date: 4/26/2018  Hospital Length of Stay: 6 days  Discharge Date and Time:  05/02/2018 5:34 PM  Attending Physician: Inocente Cam MD   Discharging Provider: Blake Luna NP  Primary Care Provider: YAKELIN Richardson MD      HPI:   82F h/o CAD, T2DM, HLD, HTN and CVA presented with UA to cardiology clinic earlier this month.  Had LHC scheduled on 4/26 showing MVD.  Had CABG x 2 per CVT today.  Currently in ICU on Levophed and insulin gtt, mechanically ventilated.   Hospital medicine consulted for medical management.     Procedure(s) (LRB):  AORTOCORONARY [id number]-[id number]-UNA-EVH-GALILEO-OP (N/A)  TRANSESOPHAGEAL ECHOCARDIOGRAM (GALILEO) (N/A)      Hospital Course:   4/28 - extubated this AM.  WBC slightly increase (rectionary). No typical CP or bleeding  4/29/2018 - mild hpt, no fever, urine output adequate, slight thrombocytopenia, creatinine slightly worse    01 May:  Ambulating well with therapy.  Orientation improved.  Tolerating regular diet.  Wounds clean and intact. 5/2/18 The patient continued to improve after surgery and participated well with therapy. No acute issues overnight. [facility] accepted the patient and the patient will continue PT/OT. The patient was seen and examined today and deemed stable for discharge. The patient will follow up with CVT PA in 2 weeks, CVT surgeon in 4 weeks, Cardiology and her PCP.      Consults:   Consults         Status Ordering Provider     Consult to Cardiology  Once     Provider:  (Not yet assigned)    Completed CHELE SUGGS     Consult to Hospitalist  Once     Provider:  (Not yet assigned)    Acknowledged CHELE SUGGS     Consult to Pulmonology  Once     Provider:  (Not yet assigned)    Completed CHELE SUGGS     Inpatient consult to Cardiothoracic Surgery  Once     Provider:  (Not yet assigned)    Completed JAMES QUINTANA      Inpatient consult to Diabetes educator  Once     Provider:  (Not yet assigned)    Completed JAMES BRYAN     Inpatient consult to Diabetes educator  Once     Provider:  (Not yet assigned)    Completed JAMES BRYAN          No new Assessment & Plan notes have been filed under this hospital service since the last note was generated.  Service: Hospital Medicine    Final Active Diagnoses:    Diagnosis Date Noted POA    PRINCIPAL PROBLEM:  CAD, multiple vessel [I25.10] 04/26/2018 Yes    S/P CABG (coronary artery bypass graft) [Z95.1] 04/29/2018 Not Applicable    Stage 3 chronic kidney disease [N18.3] 04/27/2018 Yes    Type 2 diabetes mellitus with hyperglycemia, without long-term current use of insulin [E11.65]  Yes      Problems Resolved During this Admission:    Diagnosis Date Noted Date Resolved POA    Hypoxia [R09.02] 04/28/2018 05/02/2018 No    On mechanically assisted ventilation [Z99.11] 04/27/2018 04/28/2018 Not Applicable       Discharged Condition: stable    Disposition: Skilled Nursing Facility    Follow Up:  Follow-up Information     Prudence Lemus NP On 5/16/2018.    Specialty:  Family Medicine  Why:  Follow-up with CVT NP Prudence on 5/16/18 at 1:00pm.  Contact information:  [address] [address]  [phone]             Inocente Cam MD On 5/31/2018.    Specialty:  Cardiothoracic Surgery  Why:  Follow-up with Dr. Cam on 5/31/18 at 10:15AM at CVT clinic located at [facility] ([address] [place] [address]).  Contact information:  [address] [facility]  [address]  [facility]  [facility] [address]  [phone]             [facility].    Specialties:  Rehabilitation, SNF Agency  Why:  SNF:  PT/OT  Contact information:  [address]  [facility] [address]  [phone]             Hasmukh Lopez MD. Schedule an appointment as soon as possible for a visit in 1 week.    Specialties:  Cardiology, Cardiovascular  Disease  Contact information:  [address] [facility] [address] [phone]             T Arie Richardson MD. Schedule an appointment as soon as possible for a visit in 3 days.    Specialty:  Internal Medicine  Contact information:  [address]  [facility] [id number] [phone]                 Patient Instructions:   No discharge procedures on file.    Significant Diagnostic Studies:   Imaging Results    None            Pending Diagnostic Studies:     None         Medications:  Reconciled Home Medications:      Medication List      START taking these medications    aspirin 81 MG EC tablet  Commonly known as:  ECOTRIN  Take 1 tablet (81 mg total) by mouth once daily.  Start taking on:  5/3/2018     nozaseptin nasal   Commonly known as:  NOZIN  1 application by Each Nare route 2 (two) times daily.     oxyCODONE 5 MG immediate release tablet  Commonly known as:  ROXICODONE  Take 1 tablet (5 mg total) by mouth every 4 (four) hours as needed.        CONTINUE taking these medications    ACCU-CHEK NANNETTE PLUS METER [facility]  Generic drug:  blood-glucose meter     allopurinol 100 MG tablet  Commonly known as:  ZYLOPRIM  TAKE ONE TABLET BY MOUTH ONCE DAILY     betamethasone valerate 0.1% 0.1 % Crea  Commonly known as:  VALISONE  APPLY TOPICALLY TWO TIMES DAILY     BIOTIN ORAL  Take 5,000 mcg by mouth once daily.     blood sugar diagnostic Strp  Commonly known as:  TRUETEST TEST STRIPS  1 strip by Misc.(Non-Drug; Combo Route) route 3 (three) times daily. True test strips     calcium carbonate 200 mg calcium (500 mg) chewable tablet  Commonly known as:  TUMS  Take 1 tablet by mouth once daily.     clopidogrel 75 mg tablet  Commonly known as:  PLAVIX  Take 1 tablet (75 mg total) by mouth once daily.     diphenhydrAMINE 50 MG tablet  Commonly known as:  BENADRYL  Pre - med take 6pm-12 mn-6am evening and morning before procedure     famotidine 40 MG tablet  Commonly known as:  PEPCID  Pre med take [id number]  evening and morning before procedure     furosemide 40 MG tablet  Commonly known as:  LASIX  Take 1 tablet (40 mg total) by mouth 2 (two) times daily.     GAVISCON ORAL  Take by mouth.     isosorbide mononitrate 60 MG 24 hr tablet  Commonly known as:  IMDUR  Take 1 tablet (60 mg total) by mouth once daily.     * lancets 33 gauge Misc  1 lancet by Misc.(Non-Drug; Combo Route) route 3 (three) times daily.     * ACCU-CHEK SOFTCLIX LANCETS Misc  Generic drug:  lancets     levothyroxine 50 MCG tablet  Commonly known as:  SYNTHROID  TAKE ONE TABLET BY MOUTH ONCE DAILY BEFORE BREAKFAST     lisinopril 10 MG tablet  Take 10 mg by mouth once daily.     metoprolol tartrate 50 MG tablet  Commonly known as:  LOPRESSOR  Take 2 tablets (100 mg total) by mouth 2 (two) times daily.     pantoprazole 40 MG tablet  Commonly known as:  PROTONIX     potassium chloride SA 20 MEQ tablet  Commonly known as:  K-DUR,KLOR-CON  Take 1 tablet (20 mEq total) by mouth 2 (two) times daily.     predniSONE 50 MG Tab  Commonly known as:  DELTASONE  Premed take [id number] evening and morning before procedure     walker Misc  Use rollator walker to reduce stress on knee        * This list has 2 medication(s) that are the same as other medications prescribed for you. Read the directions carefully, and ask your doctor or other care provider to review them with you.            STOP taking these medications    amoxicillin 875 MG tablet  Commonly known as:  AMOXIL            Indwelling Lines/Drains at time of discharge:   Lines/Drains/Airways          No matching active lines, drains, or airways          Time spent on the discharge of patient: > 30 minutes  Patient was seen and examined on the date of discharge and determined to be suitable for discharge.         Blake Luna, NP  Department of Hospital Medicine  [facility]

## 2018-05-02 NOTE — PLAN OF CARE
05/02/18 1204   Medicare Message   Important Message from Medicare regarding Discharge Appeal Rights Given to patient/caregiver;Explained to patient/caregiver;Signed/date by patient/caregiver   Date IMM was signed 05/02/18   Time IMM was signed 104

## 2018-05-02 NOTE — PT/OT/SLP PROGRESS
Physical Therapy  Treatment    Loida Holden   MRN: [id number]   Admitting Diagnosis: CAD, multiple vessel    PT Received On: 05/02/18  PT Start Time: 0810     PT Stop Time: 0835    PT Total Time (min): 25 min       Billable Minutes:  Gait Training 25    Treatment Type: Treatment  PT/PTA: PT     PTA Visit Number: 3       General Precautions: Standard, fall, sternal  Orthopedic Precautions: N/A   Braces: N/A         Subjective:  Communicated with NURSE TASHANTA AND EPIC CHART REVIEW  prior to session.   PT AGREED TO TX     Pain/Comfort  Pain Rating 1: 0/10  Pain Rating Post-Intervention 1: 0/10    Objective:   Patient found with: peripheral IV, telemetry, oxygen    Functional Mobility:  PT SUP.SIT EOB WITH MIN A AND CUES FOR STERNAL PRECAUTIONS.  PT STOOD WITH NO AD AND CGA FOR GT X 210' WITH O2 IN TOW. PT RETURNED TO RM RESTED EOB AND STOOD X 2 FOR 2ND GT TRIAL X 140' WITH CGA. PT RETURNED TO RM T/F TO CHAIR WITH CGA. PT ABLE TO RECALL 2 STERNAL PRECAUTIONS AND RE-EDUCATED ON ALL STERNAL PRECAUTIONS. PT LEFT SEATED IN CHAIR WITH CALL BELL IN REACH AND ALL NEEDS MET.     AM-PAC 6 CLICK MOBILITY  How much help from another person does this patient currently need?   1 = Unable, Total/Dependent Assistance  2 = A lot, Maximum/Moderate Assistance  3 = A little, Minimum/Contact Guard/Supervision  4 = None, Modified [place]/Independent    Turning over in bed (including adjusting bedclothes, sheets and blankets)?: 3  Sitting down on and standing up from a chair with arms (e.g., wheelchair, bedside commode, etc.): 3  Moving from lying on back to sitting on the side of the bed?: 3  Moving to and from a bed to a chair (including a wheelchair)?: 3  Need to walk in hospital room?: 3  Climbing 3-5 steps with a railing?: 1  Total Score: 16    AM-PAC Raw Score CMS G-Code Modifier Level of Impairment Assistance   6 % Total / Unable   7 - 9 CM 80 - 100% Maximal Assist   10 - 14 CL 60 - 80% Moderate Assist   15 -  19 CK 40 - 60% Moderate Assist   20 - 22 CJ 20 - 40% Minimal Assist   23 CI 1-20% SBA / CGA   24 CH 0% Independent/ Mod I     Patient left up in chair with call button in reach.    Assessment:  PT PROGRESSING WITH GT AND WILL CONT TO BENEFIT FROM P.T. TO ADDRESS IMPAIRMENTS LISTED AND ASSIST IN RETURN TO INDEPENDENCE.     Rehab identified problem list/impairments: Rehab identified problem list/impairments: weakness, impaired endurance, impaired functional mobilty, gait instability, decreased lower extremity function, decreased upper extremity function, decreased safety awareness, decreased ROM, impaired balance, impaired self care skills, impaired cardiopulmonary response to activity    Rehab potential is excellent.    Activity tolerance: Good    Discharge recommendations: Discharge Facility/Level Of Care Needs: nursing facility, skilled     Barriers to discharge:      Equipment recommendations: Equipment Needed After Discharge: none     GOALS:    Physical Therapy Goals        Problem: Physical Therapy Goal    Goal Priority Disciplines Outcome Goal Variances Interventions   Physical Therapy Goal     PT/OT, PT Ongoing (interventions implemented as appropriate)     Description:  LTGs to be met within 7 days (5/5/18):  1.  Patient will perform bed mobility with mod assist  2.  Patient will perform functional transfers with mod assist  3.  Patient will ambulate 100' without AD with min assist and no gross LOB  4.  Patient will verbalize all sternal precautions with 100% accuracy                      PLAN:    Patient to be seen 5 x/week  to address the above listed problems via gait training, therapeutic activities, therapeutic exercises  Plan of Care expires: 05/05/18  Plan of Care reviewed with: patient         Carlyn Domínguez, PT  05/02/2018

## 2018-05-02 NOTE — PLAN OF CARE
Problem: Patient Care Overview  Goal: Plan of Care Review  Patient currently requires max assist x 2 for bed mobility and transfers, as well as frequent cueing to follow sternal precautions.   Outcome: Ongoing (interventions implemented as appropriate)  Pt on room air tolerating well. No distress noted at this time.

## 2018-05-02 NOTE — PROGRESS NOTES
[facility]  Cardiology  Progress Note    Patient Name: Loida Holden  MRN: [id number]  Admission Date: 4/26/2018  Hospital Length of Stay: 6 days  Code Status: Full Code   Attending Physician: Inocente Cam MD   Primary Care Physician: YAKELIN Richardson MD  Expected Discharge Date:   Principal Problem:CAD, multiple vessel    Subjective:   HPI:  81 yo female with PMH CAD s/p PCI remote, and repeat LHC in 2012 nonobstructive, HTN, HLD, NIDDM, and h/o CVA. Echo in 2016 normal EF and LVH. Patient followed by [facility]. Seen in clinic earlier this month with complaints of lower sternal chest tightness for the past few months while walking to mailbox, resolved after resting. Also complained of chest pain after eating and associated with  dyspnea. Unable to do yard work due to pain. Patient underwent LHC today with Dr. Mukherjee and noted to have Severe multivessel CAD with Normal LVEF. CVT consulted for CABG     Hospital Course:   4/27/18- Patient post angiogram on yesterday that showed mutivessel CAD. CVT consulted for CABG. Planning for CABG x 2-3 today. Hypokalemia on morning labs. Vitals stable    4/28/18 - S/p CABG, doing well post op; with chest soreness, using IS; hemodynamically stable  4/29/18 - did well overnight, sitting in chair, eating tired but denied SOB, +chest soreness, will have chest tubes out and tx to tele    4/30/18-Patient seen and examined today, lying in bed. Complains of incisional pain and weakness. Labs stable. Needs to ambulate. No arrhythmias overnight.    5/1/18-Patients seen and examined today. Feels ok but complains of fatigue and weakness. No chest pain. Labs stable. CXR reviewed, needs to use IS. Lasix re-started.     5/2/18-Patient seen and examined today. Looks and feels much better. Ambulated earlier today without any issues. Remains chest pain free. Labs stable.         Review of Systems   Constitution: Positive for malaise/fatigue.   HENT: Negative.     Eyes: Negative.    Cardiovascular: Positive for dyspnea on exertion and leg swelling.   Respiratory: Positive for shortness of breath.    Endocrine: Negative.    Hematologic/Lymphatic: Negative.    Skin: Negative.    Musculoskeletal: Negative.    Gastrointestinal: Negative.    Genitourinary: Negative.    Neurological: Negative.    Psychiatric/Behavioral: Negative.    Allergic/Immunologic: Negative.      Objective:     Vital Signs (Most Recent):  Temp: 96.8 °F (36 °C) (05/02/18 0936)  Pulse: 93 (05/02/18 0936)  Resp: 20 (05/02/18 0936)  BP: (!) 161/72 (05/02/18 0936)  SpO2: 97 % (05/02/18 0936) Vital Signs (24h Range):  Temp:  [96.8 °F (36 °C)-98.8 °F (37.1 °C)] 96.8 °F (36 °C)  Pulse:  [67-93] 93  Resp:  [18-20] 20  SpO2:  [95 %-100 %] 97 %  BP: (145-187)/(65-79) 161/72     Weight: 85.5 kg (188 lb 7.9 oz)  Body mass index is 32.35 kg/m².     SpO2: 97 %  O2 Device (Oxygen Therapy): room air      Intake/Output Summary (Last 24 hours) at 05/02/18 1107  Last data filed at 05/02/18 0834   Gross per 24 hour   Intake              360 ml   Output             1950 ml   Net            -1590 ml       Lines/Drains/Airways     Peripheral Intravenous Line                 Peripheral IV - Single Lumen 04/26/18 0938 Left Wrist 6 days                Physical Exam   Constitutional: She is oriented to person, place, and time. She appears well-developed and well-nourished. No distress.   HENT:   Head: Normocephalic and atraumatic.   Eyes: Pupils are equal, round, and reactive to light. Right eye exhibits no discharge. Left eye exhibits no discharge.   Neck: Neck supple. No JVD present.   Cardiovascular: Normal rate, regular rhythm, S1 normal, S2 normal and normal heart sounds.    No murmur heard.  Pulmonary/Chest: Effort normal and breath sounds normal.   CABG site C/D/I; no drainage or erythema  Mildly decreased BS at bases   Abdominal: Soft. She exhibits no distension. There is no tenderness. There is no rebound.   Musculoskeletal:  She exhibits edema (BLE).   Neurological: She is alert and oriented to person, place, and time.   Skin: Skin is warm and dry. She is not diaphoretic. No erythema.   +moderate bruising left leg   Psychiatric: She has a normal mood and affect. Her behavior is normal. Thought content normal.   Nursing note and vitals reviewed.      Significant Labs:   CMP   Recent Labs  Lab 05/01/18  0717      K 4.0      CO2 23      BUN 18   CREATININE 0.9   CALCIUM 8.3*   ANIONGAP 12   ESTGFRAFRICA >60   EGFRNONAA 60   , CBC   Recent Labs  Lab 05/01/18  0717   WBC 9.68   HGB 10.0*   HCT 30.7*   *   , Troponin No results for input(s): TROPONINI in the last 48 hours. and All pertinent lab results from the last 24 hours have been reviewed.    Significant Imaging: Echocardiogram:   2D echo with color flow doppler:   Results for orders placed or performed during the hospital encounter of 04/26/18   2D echo with color flow doppler   Result Value Ref Range    EF 60 55 - 65    Mitral Valve Regurgitation MILD     Est. PA Systolic Pressure 22.89     Mitral Valve Mobility NORMAL     Tricuspid Valve Regurgitation TRIVIAL     and X-Ray: CXR: X-Ray Chest 1 View (CXR): No results found for this visit on 04/26/18. and X-Ray Chest PA and Lateral (CXR): No results found for this visit on 04/26/18.    Assessment and Plan:   Patient recovering s/p CABG. Continue current mgmt and IV diuresis. Continue ambulation and IS usage.    * CAD, multiple vessel    -Recovering s/p CABG  -Continue ASA, Plavix, BB  -Intolerant to statin  -Needs to use IS and ambulate more  -Continue IV diuresis            S/P CABG (coronary artery bypass graft)    -Recovering well  -Continue current mgmt  -IS usage and ambulation          Stage 3 chronic kidney disease    Monitor        Unstable angina    -Patient underwent [facility] to evaluate symptoms.   -Severe multivessel CAD with Normal LVEF  -s/p CABG            VTE Risk Mitigation         Ordered      enoxaparin injection 40 mg  Daily      04/28/18 0902     IP VTE HIGH RISK PATIENT  Once      04/26/18 1446          Shama Sargent PA-C  Cardiology  [facility]     Chart reviewed. Dr. Alcaraz examined patient and agrees with plan as outlined above.

## 2018-05-02 NOTE — PLAN OF CARE
At 1700, received call from [facility] , WHITNEY Romero, stating that per insurance nurse , patient has been approved for SNF placement at [facility].  Contacted Charley at [facility] who is aware of recent authorization and states that they will be able to accept patient this evening.    Contacted CVT PA, Rosita Mcdermott, who states that patient can be discharged today.  NP for hospitalist indicates that he will write D/C Orders on patient.  Faxed D/C Documentation to [facility] via [employer] to complete SNF referral.  Requested that Charley have [facility] contact [facility] at [phone] and notify them of discharge transport time of patient this evening from [facility] to [facility].  Also[facility]'s phone number ([phone]) to call report and notified her of above.    Also, met with patient at bedside and notified her of above as well.       05/02/18 1717   Final Note   Assessment Type Final Discharge Note   Discharge Disposition SNF  ([facility] for PT/OT)   What phone number can be called within the next 1-3 days to see how you are doing after discharge? [phone]   Right Care Referral Info   Post Acute Recommendation SNF / Sub-Acute Rehab   Referral Type SNF for PT/OT   Facility Name [facility]   [address] [place] [address]

## 2018-05-02 NOTE — PLAN OF CARE
Problem: Physical Therapy Goal  Goal: Physical Therapy Goal  LTGs to be met within 7 days (5/5/18):  1.  Patient will perform bed mobility with mod assist  2.  Patient will perform functional transfers with mod assist  3.  Patient will ambulate 100' without AD with min assist and no gross LOB  4.  Patient will verbalize all sternal precautions with 100% accuracy     Outcome: Ongoing (interventions implemented as appropriate)  Pt progressing with gt x 210' with no AD

## 2018-05-02 NOTE — NURSING
Wasted and witnessed 1mg of Ativan given to patient during dialysis, Pyxis will not accept fingerprint or user ID of dialysis nurse Bouchra COLVIN. Wasted and Witnessed with Brigitte HENDRICKS in [facility]. Bouchra COLVIN Present.

## 2018-05-02 NOTE — ASSESSMENT & PLAN NOTE
-Recovering s/p CABG  -Continue ASA, Plavix, BB  -Intolerant to statin  -Needs to use IS and ambulate more  -Continue IV diuresis

## 2018-05-02 NOTE — PLAN OF CARE
05/02/18 1204   Medicare Message   Important Message from Medicare regarding Discharge Appeal Rights Given to patient/caregiver;Explained to patient/caregiver;Signed/date by patient/caregiver   Date IMM was signed 05/02/18   Time IMM was signed 1046

## 2018-05-02 NOTE — PROGRESS NOTES
No complaints overnight, OOB in chair on RA.     Physical Exam:   Gen: NAD  Heart: RRR  Lungs: CTAB  Incision: sternal c/d/i, LLE extensive ecchymosis, EVH site c/d/i, small incision distal to knee with minimal serosanguinous drainage   Extremities: 1+ pitting edema bilaterally to LEs, palpable distal pulses     POD #5 CAB x 5   - SNF placement pending   - continue diuresis

## 2018-05-02 NOTE — PLAN OF CARE
Problem: Physical Therapy Goal  Goal: Physical Therapy Goal  LTGs to be met within 7 days (5/5/18):  1.  Patient will perform bed mobility with mod assist  2.  Patient will perform functional transfers with mod assist  3.  Patient will ambulate 100' without AD with min assist and no gross LOB  4.  Patient will verbalize all sternal precautions with 100% accuracy     Outcome: Ongoing (interventions implemented as appropriate)  PT PROGRESSING WITH ' X 1 ' X 1 WITH CGA

## 2018-05-03 ENCOUNTER — PATIENT OUTREACH (OUTPATIENT)
Dept: ADMINISTRATIVE | Facility: CLINIC | Age: 83
End: 2018-05-03

## 2018-05-03 NOTE — PHYSICIAN QUERY
"PT Name: Loida Holden  MR #: [id number]    Physician Query Form - Hematology Clarification      CDS/: Felecia Bell               Contact information: [email or web address]   This form is a permanent document in the medical record.      Query Date: May 3, 2018    By submitting this query, we are merely seeking further clarification of documentation. Please utilize your independent clinical judgment when addressing the question(s) below.    The Medical record contains the following:   Indicators  Supporting Clinical Findings Location in Medical Record    "Anemia" documented     X H & H = Hemoglobin 14.4-8.4-10  Hematocrit 42-24.6-30.7 Labs 4/26-5/1    BP =                     HR=      "GI bleeding" documented      Acute bleeding (Non GI site)     X Transfusion(s) 2units PRBC  Transfusion Record     Treatment:     X Other:  Coronary artery bypass grafting x2. Op Note 4/27     Provider, please specify diagnosis or diagnoses associated with above clinical findings.    [  ] Acute blood loss anemia expected post-operatively  [ X ] Acute blood loss anemia  [  ] Anemia of chronic disease ( Specify chronic disease)      [  ] CKD (specify stage) ___________________________     [  ] Neoplastic disease      [  ] Due to Chemotherapy      [  ] Other (Specify) _______________________________     [  ] Clinically Undetermined     [  ] Other Hematological Diagnosis (please specify): _________________________________    [  ] Clinically Undetermined       Please document in your progress notes daily for the duration of treatment, until resolved, and include in your discharge summary.                                                                                                      "

## 2018-05-03 NOTE — PHYSICIAN QUERY
"PT Name: Loida Holden  MR #: [id number]    Physician Query Form - Hematology Clarification     CDS/: Felecia Bell               Contact information: [email or web address]   This form is a permanent document in the medical record.     Query Date: May 3, 2018    By submitting this query, we are merely seeking further clarification of documentation. Please utilize your independent clinical judgment when addressing the question(s) below.    The Medical record contains the following:     Indicators   Supporting Clinical Findings   Location in Medical Record   X "Thrombocytopenia" documented  slight thrombocytopenia   DC Summary 5/2   X Platelets Platelets [id number] Labs 4/26-5/1    Acute bleeding, Petechiae, Bruising      Patient on anticoagulant medication Medications given during procedure: Heparin Bag 1000 Units/500ml (3 bags),    Left heart cath 4/26   X Transfusion(s) 2 units platelets  Transfusion Record     Treatments:       Other:          Provider, please specify diagnosis or diagnoses associated with above clinical findings.    [  ] Heparin Induced Thrombocytopenia (HIT)  [  ] Idiopathic Thrombocytopenic Purpura (ITP)  [ X ] Primary thrombocytopenia  [  ] Secondary thrombocytopenia related to (please specify) _____________________  [  ] Unspecified thrombocytopenia  [  ] Drug induced thrombocytopenia (please specify) ______________________  [  ] Other hematological diagnosis (please specify) ______________________________  [  ] Clinically Undetermined    Please document in your progress notes daily for the duration of treatment, until resolved, and include in your discharge summary.                                                                                                                                                                                                 "

## 2018-05-09 ENCOUNTER — TELEPHONE (OUTPATIENT)
Dept: CARDIOLOGY | Facility: CLINIC | Age: 83
End: 2018-05-09

## 2018-05-09 NOTE — TELEPHONE ENCOUNTER
----- Message from Shama Sargent PA-C sent at 5/9/2018  1:03 PM CDT -----  Needs hosp f/u this week or next    Thanks

## 2018-05-09 NOTE — TELEPHONE ENCOUNTER
Spoke with pt son appt has been scheduled with Dr. Lopez for next Tuesday appt letter mailed to pt.

## 2018-05-15 ENCOUNTER — OFFICE VISIT (OUTPATIENT)
Dept: CARDIOLOGY | Facility: CLINIC | Age: 83
End: 2018-05-15
Payer: MEDICARE

## 2018-05-15 VITALS — HEIGHT: 64 IN | SYSTOLIC BLOOD PRESSURE: 104 MMHG | HEART RATE: 76 BPM | DIASTOLIC BLOOD PRESSURE: 62 MMHG

## 2018-05-15 DIAGNOSIS — N18.30 STAGE 3 CHRONIC KIDNEY DISEASE: ICD-10-CM

## 2018-05-15 DIAGNOSIS — E78.5 HYPERLIPIDEMIA ASSOCIATED WITH TYPE 2 DIABETES MELLITUS: Chronic | ICD-10-CM

## 2018-05-15 DIAGNOSIS — R07.89 CHEST PAIN, ATYPICAL: ICD-10-CM

## 2018-05-15 DIAGNOSIS — E11.69 HYPERLIPIDEMIA ASSOCIATED WITH TYPE 2 DIABETES MELLITUS: Chronic | ICD-10-CM

## 2018-05-15 DIAGNOSIS — Z95.1 S/P CABG (CORONARY ARTERY BYPASS GRAFT): ICD-10-CM

## 2018-05-15 DIAGNOSIS — I25.10 CORONARY ARTERY DISEASE, OCCLUSIVE: Chronic | ICD-10-CM

## 2018-05-15 DIAGNOSIS — I10 ESSENTIAL HYPERTENSION: Chronic | ICD-10-CM

## 2018-05-15 DIAGNOSIS — I51.89 LEFT VENTRICULAR DIASTOLIC DYSFUNCTION WITH PRESERVED SYSTOLIC FUNCTION: ICD-10-CM

## 2018-05-15 DIAGNOSIS — I25.10 CORONARY ARTERY DISEASE, ANGINA PRESENCE UNSPECIFIED, UNSPECIFIED VESSEL OR LESION TYPE, UNSPECIFIED WHETHER NATIVE OR TRANSPLANTED HEART: Primary | ICD-10-CM

## 2018-05-15 PROCEDURE — 99214 OFFICE O/P EST MOD 30 MIN: CPT | Mod: S$GLB,,, | Performed by: INTERNAL MEDICINE

## 2018-05-15 PROCEDURE — 99999 PR PBB SHADOW E&M-EST. PATIENT-LVL III: CPT | Mod: PBBFAC,,, | Performed by: INTERNAL MEDICINE

## 2018-05-15 PROCEDURE — 3074F SYST BP LT 130 MM HG: CPT | Mod: CPTII,S$GLB,, | Performed by: INTERNAL MEDICINE

## 2018-05-15 PROCEDURE — 3078F DIAST BP <80 MM HG: CPT | Mod: CPTII,S$GLB,, | Performed by: INTERNAL MEDICINE

## 2018-05-15 RX ORDER — BISACODYL 10 MG
10 SUPPOSITORY, RECTAL RECTAL DAILY PRN
COMMUNITY

## 2018-05-15 RX ORDER — LISINOPRIL 2.5 MG/1
2.5 TABLET ORAL DAILY
Start: 2018-05-15 | End: 2018-06-14 | Stop reason: SDUPTHER

## 2018-05-15 RX ORDER — ISOSORBIDE MONONITRATE 30 MG/1
30 TABLET, EXTENDED RELEASE ORAL DAILY
Start: 2018-05-15 | End: 2018-09-18

## 2018-05-15 RX ORDER — FUROSEMIDE 40 MG/1
40 TABLET ORAL DAILY
Qty: 180 TABLET | Refills: 3
Start: 2018-05-15 | End: 2018-11-14 | Stop reason: SDUPTHER

## 2018-05-15 RX ORDER — METOPROLOL TARTRATE 50 MG/1
50 TABLET ORAL 2 TIMES DAILY
Qty: 120 TABLET | Refills: 6
Start: 2018-05-15 | End: 2019-01-02 | Stop reason: SDUPTHER

## 2018-05-15 RX ORDER — CIPROFLOXACIN 500 MG/1
500 TABLET ORAL 2 TIMES DAILY
COMMUNITY

## 2018-05-15 NOTE — PROGRESS NOTES
Subjective:   Patient ID:  Loida Holden is a 82 y.o. female who presents for follow up of Coronary Artery Disease; Hypertension; and Hyperlipidemia      83 yo female, came in for post CABG f/u. Followed at cardiology service.   PMH CAD s/p PCI remote, and repeat LHC in 2012 nonobstructive, HTN, HLD, NIDDM, and h/o CVA.  Echo in 2016 normal EF and LVH.  C/o lower sternal chest pain for few months while walking to mailbox, resolved after resting. The pain was not affected by the food. With dyspnea. Yard work was limited by the pain.  Had LHC in  for unstable angina, revealing multivessel Dz. And normal LVEF.  04/26, had CABgx 2 by Dr. Cam. 1. In situ LIMA to the LAD. 2.  Saphenous vein graft aorta to the obtuse marginal  Discharged to rehab and will go home tomorrow from rehab.  Today, c/o Left leg bleeding from vein harvest site.  HOOKER is better.  Now fatigue. No orthopnea and PND.  Could finish PT at rehab.          Past Medical History:   Diagnosis Date    Angina pectoris     Anxiety     Arthritis     neck, back,     Brain cyst     x2    Colon polyp     Coronary artery disease     s/p PTCA    Diabetes mellitus type 2 without retinopathy     Diabetes type 2, controlled 2/17/2016    Diverticulitis of intestine without hemorrhage 11/10/2015    Dry senile macular degeneration     Ex-smoker 11/10/2015    Fatty liver     Gastritis     Gout     Herpes genitalia     Hiatal hernia     History of shingles     Hx of colonic polyps     8/21/09    Hyperlipidemia     Hypertension     Hypothyroid     Meningioma     6/14 mri dr cuevas    Obesity (BMI 30.0-34.9) 12/2/2015    Osteopenia 4/15 klever 4/17    Pneumonia     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 hx of seizures    Stroke     Tobacco dependence      Type 2 diabetes mellitus with stage 3 chronic kidney disease, without long-term current use of insulin     Type 2 diabetes mellitus without complication        Past Surgical History:   Procedure Laterality Date    CARDIAC CATHETERIZATION      [facility] 2-MRI safe to 3T    CATARACT EXTRACTION W/  INTRAOCULAR LENS IMPLANT  OD w/YAG    CATARACT EXTRACTION W/  INTRAOCULAR LENS IMPLANT  OS w/YAG    CHOLECYSTECTOMY      2/2012    CORONARY ANGIOPLASTY      HYSTERECTOMY  1970s       Social History   Substance Use Topics    Smoking status: Former Smoker     Packs/day: 0.50     Years: 30.00     Quit date: 1/1/2002    Smokeless tobacco: Never Used    Alcohol use No       Family History   Problem Relation Age of Onset    Heart disease Mother     Cancer Mother         breast    Liver disease Mother     Diabetes Maternal Grandfather     Psoriasis Maternal Grandmother     Heart disease Son     Colon cancer Neg Hx     Melanoma Neg Hx     Lupus Neg Hx     Eczema Neg Hx          Review of Systems   Constitution: Positive for malaise/fatigue. Negative for decreased appetite, diaphoresis, fever, weakness and night sweats.   HENT: Negative for nosebleeds.    Eyes: Negative for blurred vision and double vision.   Cardiovascular: Positive for dyspnea on exertion. Negative for chest pain, claudication, irregular heartbeat, leg swelling, near-syncope, orthopnea, palpitations, paroxysmal nocturnal dyspnea and syncope.   Respiratory: Negative for cough, shortness of breath, sleep disturbances due to breathing, snoring, sputum production and wheezing.    Endocrine: Negative for cold intolerance and polyuria.   Hematologic/Lymphatic: Does not bruise/bleed easily.   Skin: Negative for rash.   Musculoskeletal: Negative for back pain, falls, joint pain, joint swelling and neck pain.   Gastrointestinal: Negative for abdominal pain, heartburn, nausea and vomiting.   Genitourinary: Negative for dysuria, frequency and  hematuria.   Neurological: Negative for difficulty with concentration, dizziness, focal weakness, headaches, light-headedness, numbness and seizures.   Psychiatric/Behavioral: Negative for depression, memory loss and substance abuse. The patient does not have insomnia.    Allergic/Immunologic: Negative for HIV exposure and hives.       Objective:   Physical Exam   Constitutional: She is oriented to person, place, and time. She appears well-nourished.   HENT:   Head: Normocephalic.   Eyes: Pupils are equal, round, and reactive to light.   Neck: Normal carotid pulses and no JVD present. Carotid bruit is not present. No thyromegaly present.   Cardiovascular: Normal rate, regular rhythm, normal heart sounds and normal pulses.   No extrasystoles are present. PMI is not displaced.  Exam reveals no gallop and no S3.    No murmur heard.  Pulmonary/Chest: Breath sounds normal. No stridor. No respiratory distress.   Surgical wound healing   Abdominal: Soft. Bowel sounds are normal. There is no tenderness. There is no rebound.   Musculoskeletal: Normal range of motion.   Neurological: She is alert and oriented to person, place, and time.   Skin: Skin is intact. No rash noted.   Left leg healing and some oozing of water.   Psychiatric: Her behavior is normal.       Lab Results   Component Value Date    CHOL 174 02/09/2017    CHOL 221 (H) 09/14/2016    CHOL 139 02/11/2016     Lab Results   Component Value Date    HDL 31 (L) 02/09/2017    HDL 36 (L) 09/14/2016    HDL 32 (L) 02/11/2016     Lab Results   Component Value Date    LDLCALC 100.2 02/09/2017    LDLCALC 131.2 09/14/2016    LDLCALC 78.4 02/11/2016     Lab Results   Component Value Date    TRIG 214 (H) 02/09/2017    TRIG 269 (H) 09/14/2016    TRIG 143 02/11/2016     Lab Results   Component Value Date    CHOLHDL 17.8 (L) 02/09/2017    CHOLHDL 16.3 (L) 09/14/2016    CHOLHDL 23.0 02/11/2016       Chemistry        Component Value Date/Time     05/01/2018 0717    K 4.0  05/01/2018 0717     05/01/2018 0717    CO2 23 05/01/2018 0717    BUN 18 05/01/2018 0717    CREATININE 0.9 05/01/2018 0717     05/01/2018 0717        Component Value Date/Time    CALCIUM 8.3 (L) 05/01/2018 0717    ALKPHOS 90 04/27/2018 0450    AST 33 04/27/2018 0450    ALT 30 04/27/2018 0450    BILITOT 0.4 04/27/2018 0450    ESTGFRAFRICA >60 05/01/2018 0717    EGFRNONAA 60 05/01/2018 0717          Lab Results   Component Value Date    TSH 4.159 (H) 10/26/2017     Lab Results   Component Value Date    INR 1.4 (H) 04/27/2018    INR 1.0 04/26/2018    INR 1.0 07/02/2012     Lab Results   Component Value Date    WBC 9.68 05/01/2018    HGB 10.0 (L) 05/01/2018    HCT 30.7 (L) 05/01/2018    MCV 94 05/01/2018     (L) 05/01/2018     BMP  Sodium   Date Value Ref Range Status   05/01/2018 142 136 - 145 mmol/L Final     Potassium   Date Value Ref Range Status   05/01/2018 4.0 3.5 - 5.1 mmol/L Final     Chloride   Date Value Ref Range Status   05/01/2018 107 95 - 110 mmol/L Final     CO2   Date Value Ref Range Status   05/01/2018 23 23 - 29 mmol/L Final     BUN, Bld   Date Value Ref Range Status   05/01/2018 18 8 - 23 mg/dL Final     Creatinine   Date Value Ref Range Status   05/01/2018 0.9 0.5 - 1.4 mg/dL Final     Calcium   Date Value Ref Range Status   05/01/2018 8.3 (L) 8.7 - 10.5 mg/dL Final     Anion Gap   Date Value Ref Range Status   05/01/2018 12 8 - 16 mmol/L Final     eGFR if    Date Value Ref Range Status   05/01/2018 >60 >60 mL/min/1.73 m^2 Final     eGFR if non    Date Value Ref Range Status   05/01/2018 60 >60 mL/min/1.73 m^2 Final     Comment:     Calculation used to obtain the estimated glomerular filtration  rate (eGFR) is the CKD-EPI equation.        CrCl cannot be calculated (Patient's most recent lab result is older than the maximum 7 days allowed.).     Assessment:      1. Coronary artery disease, angina presence unspecified, unspecified vessel or lesion  type, unspecified whether native or transplanted heart    2. S/P CABG (coronary artery bypass graft)    3. Essential hypertension    4. Hyperlipidemia associated with type 2 diabetes mellitus    5. Stage 3 chronic kidney disease        Plan:   Decrease Lasix to 40 mg daily  Decrease Lisinopril from 5mg daily to 2.5 mg daily due to low BP.  Continue Metoprolol 50 mg bid, ASA, Lipitor and Plavuix  Contact CVT for leg wound check,  Continue home health.  DASH.  RTC in 4 weeks and will recheck lipid profile.

## 2018-06-14 ENCOUNTER — LAB VISIT (OUTPATIENT)
Dept: LAB | Facility: HOSPITAL | Age: 83
End: 2018-06-14
Attending: INTERNAL MEDICINE
Payer: MEDICARE

## 2018-06-14 ENCOUNTER — OFFICE VISIT (OUTPATIENT)
Dept: CARDIOLOGY | Facility: CLINIC | Age: 83
End: 2018-06-14
Payer: MEDICARE

## 2018-06-14 VITALS
HEIGHT: 64 IN | HEART RATE: 98 BPM | SYSTOLIC BLOOD PRESSURE: 148 MMHG | DIASTOLIC BLOOD PRESSURE: 82 MMHG | BODY MASS INDEX: 28.56 KG/M2 | WEIGHT: 167.31 LBS

## 2018-06-14 DIAGNOSIS — E11.69 HYPERLIPIDEMIA ASSOCIATED WITH TYPE 2 DIABETES MELLITUS: Chronic | ICD-10-CM

## 2018-06-14 DIAGNOSIS — I73.9 PVD (PERIPHERAL VASCULAR DISEASE): ICD-10-CM

## 2018-06-14 DIAGNOSIS — E78.5 HYPERLIPIDEMIA ASSOCIATED WITH TYPE 2 DIABETES MELLITUS: Chronic | ICD-10-CM

## 2018-06-14 DIAGNOSIS — N18.30 STAGE 3 CHRONIC KIDNEY DISEASE: ICD-10-CM

## 2018-06-14 DIAGNOSIS — I50.32 CHRONIC DIASTOLIC CONGESTIVE HEART FAILURE: ICD-10-CM

## 2018-06-14 DIAGNOSIS — I25.10 CAD, MULTIPLE VESSEL: ICD-10-CM

## 2018-06-14 DIAGNOSIS — Z95.1 S/P CABG (CORONARY ARTERY BYPASS GRAFT): ICD-10-CM

## 2018-06-14 DIAGNOSIS — I25.10 CAD, MULTIPLE VESSEL: Primary | ICD-10-CM

## 2018-06-14 DIAGNOSIS — I10 ESSENTIAL HYPERTENSION: Chronic | ICD-10-CM

## 2018-06-14 PROCEDURE — 83880 ASSAY OF NATRIURETIC PEPTIDE: CPT

## 2018-06-14 PROCEDURE — 3079F DIAST BP 80-89 MM HG: CPT | Mod: CPTII,S$GLB,, | Performed by: INTERNAL MEDICINE

## 2018-06-14 PROCEDURE — 99999 PR PBB SHADOW E&M-EST. PATIENT-LVL III: CPT | Mod: PBBFAC,,, | Performed by: INTERNAL MEDICINE

## 2018-06-14 PROCEDURE — 3077F SYST BP >= 140 MM HG: CPT | Mod: CPTII,S$GLB,, | Performed by: INTERNAL MEDICINE

## 2018-06-14 PROCEDURE — 36415 COLL VENOUS BLD VENIPUNCTURE: CPT | Mod: PO

## 2018-06-14 PROCEDURE — 99214 OFFICE O/P EST MOD 30 MIN: CPT | Mod: S$GLB,,, | Performed by: INTERNAL MEDICINE

## 2018-06-14 RX ORDER — METFORMIN HYDROCHLORIDE 500 MG/1
500 TABLET ORAL DAILY
COMMUNITY
Start: 2018-05-16

## 2018-06-14 RX ORDER — LISINOPRIL 5 MG/1
5 TABLET ORAL DAILY
Qty: 30 TABLET | Refills: 5
Start: 2018-06-14 | End: 2018-09-18

## 2018-06-14 NOTE — PROGRESS NOTES
Subjective:   Patient ID:  Loida Holden is a 82 y.o. female who presents for follow up of Follow-up      81 yo female, came in for post CABG f/u. Followed at cardiology service.   PMH CAD s/p PCI remote, and repeat LHC in 2012 nonobstructive, HTN, HLD, NIDDM, and h/o CVA.  Echo in 2016 normal EF and LVH.  C/o lower sternal chest pain for few months while walking to mailbox, resolved after resting. The pain was not affected by the food. With dyspnea. Yard work was limited by the pain.  Had LHC in  for unstable angina, revealing multivessel Dz. And normal LVEF.  04/26, had CABgx 2 by Dr. Cam. 1. In situ LIMA to the LAD. 2.  Saphenous vein graft aorta to the obtuse marginal  Discharged to rehab and will go home tomorrow from rehab.  Today, leg surgical wound healing well.  HOOKER is slightly worse. The ankle edema is worse at night and better in AM.  Now fatigue. No orthopnea and PND.  Finished PT last week.    Had episode of black stool last week and resolved after stopping Plavix.  Could not tolerate ASA due to stomach injury.          Past Medical History:   Diagnosis Date    Angina pectoris     Anxiety     Arthritis     neck, back,     Brain cyst     x2    Colon polyp     Coronary artery disease     s/p PTCA    Diabetes mellitus type 2 without retinopathy     Diabetes type 2, controlled 2/17/2016    Diverticulitis of intestine without hemorrhage 11/10/2015    Dry senile macular degeneration     Ex-smoker 11/10/2015    Fatty liver     Gastritis     Gout     Herpes genitalia     Hiatal hernia     History of shingles     Hx of colonic polyps     8/21/09    Hyperlipidemia     Hypertension     Hypothyroid     Meningioma     6/14 mri dr cuevas    Obesity (BMI 30.0-34.9) 12/2/2015    Osteopenia 4/15 klever 4/17    Pneumonia     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 hx of seizures    Stroke     Tobacco dependence     Type 2 diabetes mellitus with stage 3 chronic kidney disease, without long-term current use of insulin     Type 2 diabetes mellitus without complication        Past Surgical History:   Procedure Laterality Date    CARDIAC CATHETERIZATION      [facility] 2-MRI safe to 3T    CATARACT EXTRACTION W/  INTRAOCULAR LENS IMPLANT  OD w/YAG    CATARACT EXTRACTION W/  INTRAOCULAR LENS IMPLANT  OS w/YAG    CHOLECYSTECTOMY      2/2012    CORONARY ANGIOPLASTY      HYSTERECTOMY  1970s       Social History   Substance Use Topics    Smoking status: Former Smoker     Packs/day: 0.50     Years: 30.00     Quit date: 1/1/2002    Smokeless tobacco: Never Used    Alcohol use No       Family History   Problem Relation Age of Onset    Heart disease Mother     Cancer Mother         breast    Liver disease Mother     Diabetes Maternal Grandfather     Psoriasis Maternal Grandmother     Heart disease Son     Colon cancer Neg Hx     Melanoma Neg Hx     Lupus Neg Hx     Eczema Neg Hx          Review of Systems   Constitution: Positive for malaise/fatigue. Negative for decreased appetite, diaphoresis, fever, weakness and night sweats.   HENT: Negative for nosebleeds.    Eyes: Negative for blurred vision and double vision.   Cardiovascular: Positive for dyspnea on exertion. Negative for chest pain, claudication, irregular heartbeat, leg swelling, near-syncope, orthopnea, palpitations, paroxysmal nocturnal dyspnea and syncope.   Respiratory: Negative for cough, shortness of breath, sleep disturbances due to breathing, snoring, sputum production and wheezing.    Endocrine: Negative for cold intolerance and polyuria.   Hematologic/Lymphatic: Does not bruise/bleed easily.   Skin: Negative for rash.   Musculoskeletal: Negative for back pain, falls, joint pain, joint swelling and neck pain.    Gastrointestinal: Negative for abdominal pain, heartburn, nausea and vomiting.   Genitourinary: Negative for dysuria, frequency and hematuria.   Neurological: Negative for difficulty with concentration, dizziness, focal weakness, headaches, light-headedness, numbness and seizures.   Psychiatric/Behavioral: Negative for depression, memory loss and substance abuse. The patient does not have insomnia.    Allergic/Immunologic: Negative for HIV exposure and hives.       Objective:   Physical Exam   Constitutional: She is oriented to person, place, and time. She appears well-nourished.   HENT:   Head: Normocephalic.   Eyes: Pupils are equal, round, and reactive to light.   Neck: Normal carotid pulses and no JVD present. Carotid bruit is not present. No thyromegaly present.   Cardiovascular: Normal rate, regular rhythm, normal heart sounds and normal pulses.   No extrasystoles are present. PMI is not displaced.  Exam reveals no gallop and no S3.    No murmur heard.  Pulmonary/Chest: Breath sounds normal. No stridor. No respiratory distress.   Surgical wound healing  crackles on bases   Abdominal: Soft. Bowel sounds are normal. There is no tenderness. There is no rebound.   Musculoskeletal: Normal range of motion. She exhibits edema.   + ankles   Neurological: She is alert and oriented to person, place, and time.   Skin: Skin is intact. No rash noted.   Left leg healing and some oozing of water.   Psychiatric: Her behavior is normal.       Lab Results   Component Value Date    CHOL 174 02/09/2017    CHOL 221 (H) 09/14/2016    CHOL 139 02/11/2016     Lab Results   Component Value Date    HDL 31 (L) 02/09/2017    HDL 36 (L) 09/14/2016    HDL 32 (L) 02/11/2016     Lab Results   Component Value Date    LDLCALC 100.2 02/09/2017    LDLCALC 131.2 09/14/2016    LDLCALC 78.4 02/11/2016     Lab Results   Component Value Date    TRIG 214 (H) 02/09/2017    TRIG 269 (H) 09/14/2016    TRIG 143 02/11/2016     Lab Results   Component  Value Date    CHOLHDL 17.8 (L) 02/09/2017    CHOLHDL 16.3 (L) 09/14/2016    CHOLHDL 23.0 02/11/2016       Chemistry        Component Value Date/Time     05/01/2018 0717    K 4.0 05/01/2018 0717     05/01/2018 0717    CO2 23 05/01/2018 0717    BUN 18 05/01/2018 0717    CREATININE 0.9 05/01/2018 0717     05/01/2018 0717        Component Value Date/Time    CALCIUM 8.3 (L) 05/01/2018 0717    ALKPHOS 90 04/27/2018 0450    AST 33 04/27/2018 0450    ALT 30 04/27/2018 0450    BILITOT 0.4 04/27/2018 0450    ESTGFRAFRICA >60 05/01/2018 0717    EGFRNONAA 60 05/01/2018 0717          Lab Results   Component Value Date    TSH 4.159 (H) 10/26/2017     Lab Results   Component Value Date    INR 1.4 (H) 04/27/2018    INR 1.0 04/26/2018    INR 1.0 07/02/2012     Lab Results   Component Value Date    WBC 9.68 05/01/2018    HGB 10.0 (L) 05/01/2018    HCT 30.7 (L) 05/01/2018    MCV 94 05/01/2018     (L) 05/01/2018     BMP  Sodium   Date Value Ref Range Status   05/01/2018 142 136 - 145 mmol/L Final     Potassium   Date Value Ref Range Status   05/01/2018 4.0 3.5 - 5.1 mmol/L Final     Chloride   Date Value Ref Range Status   05/01/2018 107 95 - 110 mmol/L Final     CO2   Date Value Ref Range Status   05/01/2018 23 23 - 29 mmol/L Final     BUN, Bld   Date Value Ref Range Status   05/01/2018 18 8 - 23 mg/dL Final     Creatinine   Date Value Ref Range Status   05/01/2018 0.9 0.5 - 1.4 mg/dL Final     Calcium   Date Value Ref Range Status   05/01/2018 8.3 (L) 8.7 - 10.5 mg/dL Final     Anion Gap   Date Value Ref Range Status   05/01/2018 12 8 - 16 mmol/L Final     eGFR if    Date Value Ref Range Status   05/01/2018 >60 >60 mL/min/1.73 m^2 Final     eGFR if non    Date Value Ref Range Status   05/01/2018 60 >60 mL/min/1.73 m^2 Final     Comment:     Calculation used to obtain the estimated glomerular filtration  rate (eGFR) is the CKD-EPI equation.        CrCl cannot be calculated  (Patient's most recent lab result is older than the maximum 7 days allowed.).     Assessment:      1. CAD, multiple vessel    2. Essential hypertension    3. Hyperlipidemia associated with type 2 diabetes mellitus    4. S/P CABG (coronary artery bypass graft)    5. Stage 3 chronic kidney disease      BP high  Stopped Plavix due to suspected GI bleeding and black stool    Plan:   Increase Lisinopril to 5 mg for BP control  Continue lasix 40 mg daily, Imdur, Metoprolol 50 mg bid,   Check BNP, will increase Lasix if BNP higher.   DASH  advise to go to see her GI doctor and she needs antiplatelet Rx.  RTC in 3 months

## 2018-06-15 LAB — BNP SERPL-MCNC: 61 PG/ML

## 2018-06-18 ENCOUNTER — TELEPHONE (OUTPATIENT)
Dept: CARDIOLOGY | Facility: CLINIC | Age: 83
End: 2018-06-18

## 2018-06-18 NOTE — TELEPHONE ENCOUNTER
----- Message from Hasmukh Lopez MD sent at 6/18/2018  3:32 PM CDT -----  Lab showed CHF controlled.   recommend to change lasix to ANGELITA YING, F and Sunday

## 2018-09-18 ENCOUNTER — TELEPHONE (OUTPATIENT)
Dept: PHARMACY | Facility: CLINIC | Age: 83
End: 2018-09-18

## 2018-09-18 ENCOUNTER — OFFICE VISIT (OUTPATIENT)
Dept: CARDIOLOGY | Facility: CLINIC | Age: 83
End: 2018-09-18
Payer: MEDICARE

## 2018-09-18 VITALS
WEIGHT: 167.13 LBS | DIASTOLIC BLOOD PRESSURE: 76 MMHG | SYSTOLIC BLOOD PRESSURE: 148 MMHG | HEIGHT: 64 IN | BODY MASS INDEX: 28.53 KG/M2 | HEART RATE: 72 BPM

## 2018-09-18 DIAGNOSIS — I10 ESSENTIAL HYPERTENSION: Chronic | ICD-10-CM

## 2018-09-18 DIAGNOSIS — E11.65 TYPE 2 DIABETES MELLITUS WITH HYPERGLYCEMIA, WITHOUT LONG-TERM CURRENT USE OF INSULIN: ICD-10-CM

## 2018-09-18 DIAGNOSIS — I50.32 CHRONIC DIASTOLIC CONGESTIVE HEART FAILURE: ICD-10-CM

## 2018-09-18 DIAGNOSIS — Z95.1 S/P CABG (CORONARY ARTERY BYPASS GRAFT): ICD-10-CM

## 2018-09-18 DIAGNOSIS — E11.69 HYPERLIPIDEMIA ASSOCIATED WITH TYPE 2 DIABETES MELLITUS: Chronic | ICD-10-CM

## 2018-09-18 DIAGNOSIS — I25.10 CAD, MULTIPLE VESSEL: Primary | ICD-10-CM

## 2018-09-18 DIAGNOSIS — N18.30 STAGE 3 CHRONIC KIDNEY DISEASE: ICD-10-CM

## 2018-09-18 DIAGNOSIS — R07.89 CHEST PAIN, ATYPICAL: ICD-10-CM

## 2018-09-18 DIAGNOSIS — Z78.9 STATIN INTOLERANCE: ICD-10-CM

## 2018-09-18 DIAGNOSIS — E78.5 HYPERLIPIDEMIA ASSOCIATED WITH TYPE 2 DIABETES MELLITUS: Chronic | ICD-10-CM

## 2018-09-18 PROCEDURE — 3077F SYST BP >= 140 MM HG: CPT | Mod: CPTII,,, | Performed by: INTERNAL MEDICINE

## 2018-09-18 PROCEDURE — 3078F DIAST BP <80 MM HG: CPT | Mod: CPTII,,, | Performed by: INTERNAL MEDICINE

## 2018-09-18 PROCEDURE — 99213 OFFICE O/P EST LOW 20 MIN: CPT | Mod: PBBFAC,PO | Performed by: INTERNAL MEDICINE

## 2018-09-18 PROCEDURE — 99999 PR PBB SHADOW E&M-EST. PATIENT-LVL III: CPT | Mod: PBBFAC,,, | Performed by: INTERNAL MEDICINE

## 2018-09-18 PROCEDURE — 99214 OFFICE O/P EST MOD 30 MIN: CPT | Mod: S$PBB,,, | Performed by: INTERNAL MEDICINE

## 2018-09-18 RX ORDER — CLOPIDOGREL BISULFATE 75 MG/1
75 TABLET ORAL DAILY
Qty: 30 TABLET | Refills: 11
Start: 2018-09-18 | End: 2019-09-18

## 2018-09-18 RX ORDER — AMLODIPINE BESYLATE 2.5 MG/1
2.5 TABLET ORAL DAILY
Qty: 30 TABLET | Refills: 11 | Status: SHIPPED | OUTPATIENT
Start: 2018-09-18 | End: 2021-04-12

## 2018-09-18 RX ORDER — ISOSORBIDE MONONITRATE 30 MG/1
60 TABLET, EXTENDED RELEASE ORAL DAILY
Start: 2018-09-18

## 2018-09-18 RX ORDER — BISMUTH SUBSALICYLATE 262 MG/1
TABLET ORAL
COMMUNITY

## 2018-09-18 RX ORDER — LISINOPRIL 5 MG/1
20 TABLET ORAL DAILY
Start: 2018-09-18

## 2018-09-18 NOTE — PROGRESS NOTES
Subjective:   Patient ID:  Loida Holden is a 82 y.o. female who presents for follow up of Follow-up      83 yo female, came in for post CABG f/u. Followed at cardiology service.   PMH CAD s/p PCI remote, and repeat LHC in 2012 nonobstructive, HTN, HLD, NIDDM, and h/o CVA.  Echo in 2016 normal EF and LVH.  C/o lower sternal chest pain for few months while walking to mailbox, resolved after resting. The pain was not affected by the food. With dyspnea. Yard work was limited by the pain.  Had LHC in  for unstable angina, revealing multivessel Dz. And normal LVEF.  04/26/2018, had CABgx 2 by Dr. Cam. 1. In situ LIMA to the LAD. 2.  Saphenous vein graft aorta to the obtuse marginal  Tooth ache resolved after dental work.  Today, leg surgical wound healing well.  HOOKER is slightly worse. The ankle edema is worse at night and better in AM.  Less fatigue. No orthopnea and PND.  Finished PT.  Some imbalance.  Now Plavix and no more GI bleeding.  Could not tolerate ASA due to stomach injury.          Past Medical History:   Diagnosis Date    Angina pectoris     Anxiety     Arthritis     neck, back,     Brain cyst     x2    Chronic diastolic congestive heart failure 6/14/2018    Colon polyp     Coronary artery disease     s/p PTCA    Diabetes mellitus type 2 without retinopathy     Diabetes type 2, controlled 2/17/2016    Diverticulitis of intestine without hemorrhage 11/10/2015    Dry senile macular degeneration     Ex-smoker 11/10/2015    Fatty liver     Gastritis     Gout     Herpes genitalia     Hiatal hernia     History of shingles     Hx of colonic polyps     8/21/09    Hyperlipidemia     Hypertension     Hypothyroid     Meningioma     6/14 mri dr cuevas    Obesity (BMI 30.0-34.9) 12/2/2015    Osteopenia 4/15 klever 4/17    Pneumonia     Procidentia of rectum 5/31/2012    PVD (peripheral vascular disease) 6/14/2018    Recurrent vomiting     gi eval    Renal manifestation of  secondary diabetes mellitus     S/P PTCA (percutaneous transluminal coronary angioplasty) 10/23/2013    Seizures     per pt. hx of seizures    Stroke     Tobacco dependence     Type 2 diabetes mellitus with stage 3 chronic kidney disease, without long-term current use of insulin     Type 2 diabetes mellitus without complication        Past Surgical History:   Procedure Laterality Date    AORTOCORONARY [id number]-GALILEO-OP N/A 2018    Performed by Inocente Cam [facility] at [facility] OR    CARDIAC CATHETERIZATION      [facility] 2-MRI safe to 3T    CATARACT EXTRACTION W/  INTRAOCULAR LENS IMPLANT  OD w/YAG    CATARACT EXTRACTION W/  INTRAOCULAR LENS IMPLANT  OS w/YAG    CHOLECYSTECTOMY      2012    CORONARY ANGIOPLASTY      HEART CATH-LEFT Left 2018    Performed by Robb Mukherjee MD at [facility] CATH LAB    HYSTERECTOMY  1970s    TRANSESOPHAGEAL ECHOCARDIOGRAM (GALILEO) N/A 2018    Performed by Inocente Cam MD at [facility] OR       Social History     Tobacco Use    Smoking status: Former Smoker     Packs/day: 0.50     Years: 30.00     Pack years: 15.00     Last attempt to quit: 2002     Years since quittin.7    Smokeless tobacco: Never Used   Substance Use Topics    Alcohol use: No     Alcohol/week: 0.0 oz    Drug use: No       Family History   Problem Relation Age of Onset    Heart disease Mother     Cancer Mother         breast    Liver disease Mother     Diabetes Maternal Grandfather     Psoriasis Maternal Grandmother     Heart disease Son     Colon cancer Neg Hx     Melanoma Neg Hx     Lupus Neg Hx     Eczema Neg Hx          Review of Systems   Constitution: Positive for malaise/fatigue. Negative for decreased appetite, diaphoresis, fever, weakness and night sweats.   HENT: Negative for nosebleeds.    Eyes: Negative for blurred vision and double vision.   Cardiovascular: Positive for dyspnea on exertion. Negative for chest pain, claudication, irregular heartbeat,  leg swelling, near-syncope, orthopnea, palpitations, paroxysmal nocturnal dyspnea and syncope.   Respiratory: Negative for cough, shortness of breath, sleep disturbances due to breathing, snoring, sputum production and wheezing.    Endocrine: Negative for cold intolerance and polyuria.   Hematologic/Lymphatic: Does not bruise/bleed easily.   Skin: Negative for rash.   Musculoskeletal: Negative for back pain, falls, joint pain, joint swelling and neck pain.   Gastrointestinal: Negative for abdominal pain, heartburn, nausea and vomiting.   Genitourinary: Negative for dysuria, frequency and hematuria.   Neurological: Negative for difficulty with concentration, dizziness, focal weakness, headaches, light-headedness, numbness and seizures.   Psychiatric/Behavioral: Negative for depression, memory loss and substance abuse. The patient does not have insomnia.    Allergic/Immunologic: Negative for HIV exposure and hives.       Objective:   Physical Exam   Constitutional: She is oriented to person, place, and time. She appears well-nourished.   HENT:   Head: Normocephalic.   Eyes: Pupils are equal, round, and reactive to light.   Neck: Normal carotid pulses and no JVD present. Carotid bruit is not present. No thyromegaly present.   Cardiovascular: Normal rate, regular rhythm, normal heart sounds and normal pulses.  No extrasystoles are present. PMI is not displaced. Exam reveals no gallop and no S3.   No murmur heard.  Pulmonary/Chest: Breath sounds normal. No stridor. No respiratory distress.   Surgical wound healing  crackles on bases   Abdominal: Soft. Bowel sounds are normal. There is no tenderness. There is no rebound.   Musculoskeletal: Normal range of motion. She exhibits edema.   + ankles   Neurological: She is alert and oriented to person, place, and time.   Skin: Skin is intact. No rash noted.   Left leg healing and some oozing of water.   Psychiatric: Her behavior is normal.       Lab Results   Component Value  Date    CHOL 174 02/09/2017    CHOL 221 (H) 09/14/2016    CHOL 139 02/11/2016     Lab Results   Component Value Date    HDL 31 (L) 02/09/2017    HDL 36 (L) 09/14/2016    HDL 32 (L) 02/11/2016     Lab Results   Component Value Date    LDLCALC 100.2 02/09/2017    LDLCALC 131.2 09/14/2016    LDLCALC 78.4 02/11/2016     Lab Results   Component Value Date    TRIG 214 (H) 02/09/2017    TRIG 269 (H) 09/14/2016    TRIG 143 02/11/2016     Lab Results   Component Value Date    CHOLHDL 17.8 (L) 02/09/2017    CHOLHDL 16.3 (L) 09/14/2016    CHOLHDL 23.0 02/11/2016       Chemistry        Component Value Date/Time     05/01/2018 0717    K 4.0 05/01/2018 0717     05/01/2018 0717    CO2 23 05/01/2018 0717    BUN 18 05/01/2018 0717    CREATININE 0.9 05/01/2018 0717     05/01/2018 0717        Component Value Date/Time    CALCIUM 8.3 (L) 05/01/2018 0717    ALKPHOS 90 04/27/2018 0450    AST 33 04/27/2018 0450    ALT 30 04/27/2018 0450    BILITOT 0.4 04/27/2018 0450    ESTGFRAFRICA >60 05/01/2018 0717    EGFRNONAA 60 05/01/2018 0717          Lab Results   Component Value Date    HGBA1C 9.6 (H) 04/26/2018     Lab Results   Component Value Date    TSH 4.159 (H) 10/26/2017     Lab Results   Component Value Date    INR 1.4 (H) 04/27/2018    INR 1.0 04/26/2018    INR 1.0 07/02/2012     Lab Results   Component Value Date    WBC 9.68 05/01/2018    HGB 10.0 (L) 05/01/2018    HCT 30.7 (L) 05/01/2018    MCV 94 05/01/2018     (L) 05/01/2018     BMP  Sodium   Date Value Ref Range Status   05/01/2018 142 136 - 145 mmol/L Final     Potassium   Date Value Ref Range Status   05/01/2018 4.0 3.5 - 5.1 mmol/L Final     Chloride   Date Value Ref Range Status   05/01/2018 107 95 - 110 mmol/L Final     CO2   Date Value Ref Range Status   05/01/2018 23 23 - 29 mmol/L Final     BUN, Bld   Date Value Ref Range Status   05/01/2018 18 8 - 23 mg/dL Final     Creatinine   Date Value Ref Range Status   05/01/2018 0.9 0.5 - 1.4 mg/dL Final      Calcium   Date Value Ref Range Status   05/01/2018 8.3 (L) 8.7 - 10.5 mg/dL Final     Anion Gap   Date Value Ref Range Status   05/01/2018 12 8 - 16 mmol/L Final     eGFR if    Date Value Ref Range Status   05/01/2018 >60 >60 mL/min/1.73 m^2 Final     eGFR if non    Date Value Ref Range Status   05/01/2018 60 >60 mL/min/1.73 m^2 Final     Comment:     Calculation used to obtain the estimated glomerular filtration  rate (eGFR) is the CKD-EPI equation.        BNP  @LABRCNTIP(BNP,BNPTRIAGEBLO)@  @LABRCNTIP(troponini)@  CrCl cannot be calculated (Patient's most recent lab result is older than the maximum 7 days allowed.).  No results found in the last 24 hours.  No results found in the last 24 hours.  No results found in the last 24 hours.    Assessment:      1. CAD, multiple vessel    2. S/P CABG (coronary artery bypass graft)    3. Chronic diastolic congestive heart failure    4. Essential hypertension    5. Hyperlipidemia associated with type 2 diabetes mellitus    6. Stage 3 chronic kidney disease    7. Type 2 diabetes mellitus with hyperglycemia, without long-term current use of insulin    8. Chest pain, atypical    9. Statin intolerance      S/p cabg stable and improving  No CP   CHF compensated  High BP  624295 at [facility]  and Total , HDL 33 and   Plan:   Add amlodipine 2.5 mg dailyt  Continue Plavix, Metoprolol, lisinopril, Statin and Lasix 40 mg daily  Obtain Lipid profile from PCP.  Add praluent  RTC in 4 months    ADDENDUM ON 10/03  REFUSED PCSK9 INHIBITOR NOW.

## 2018-09-20 NOTE — TELEPHONE ENCOUNTER
Documentation Only:    Faxed Prior Authorization form and supporting documentation for Praluent to insurance on 09/20/2018.

## 2018-09-24 NOTE — TELEPHONE ENCOUNTER
Documentation Only:    Prior Authorization for Praluent has been approved for 6 months.    Approval dates: 09/22/2018 through 03/22/2019    Patient co-pay: $570.20    Researching possible co-pay assistance.

## 2018-10-03 NOTE — TELEPHONE ENCOUNTER
Patient called to state that she had received FA application but does not want take her PRALUENT. She declined to speak to [facility]. Patient states that she is on a lot of medications and they are causing her to pass out. She has decided not to add any new medications while her Cardiologist and other doctor's decide how to move forward. Informed [facility] (TTN) of the patient's decision. LAUREL Kunz.

## 2018-10-03 NOTE — TELEPHONE ENCOUNTER
FAITHM for patient to check the status of the Praluent assistance sonia that we mailed to her on 9/24. I will need the signed sonia, income information and 2018 prescription printout from  the patient in order to proceed with the application. We do not have the provider's portion on file as of yet. Elkin MILLER

## 2018-11-05 ENCOUNTER — TELEPHONE (OUTPATIENT)
Dept: OPHTHALMOLOGY | Facility: CLINIC | Age: 83
End: 2018-11-05

## 2018-11-05 ENCOUNTER — OFFICE VISIT (OUTPATIENT)
Dept: OPHTHALMOLOGY | Facility: CLINIC | Age: 83
End: 2018-11-05
Payer: MEDICARE

## 2018-11-05 DIAGNOSIS — Z96.1 PSEUDOPHAKIA: ICD-10-CM

## 2018-11-05 DIAGNOSIS — H35.3131 EARLY DRY STAGE NONEXUDATIVE AGE-RELATED MACULAR DEGENERATION OF BOTH EYES: Primary | ICD-10-CM

## 2018-11-05 DIAGNOSIS — H11.31 SUBCONJUNCTIVAL BLEED, RIGHT: ICD-10-CM

## 2018-11-05 DIAGNOSIS — D32.9 MENINGIOMA: ICD-10-CM

## 2018-11-05 DIAGNOSIS — E11.9 DIABETES MELLITUS TYPE 2 WITHOUT RETINOPATHY: ICD-10-CM

## 2018-11-05 PROCEDURE — 99999 PR PBB SHADOW E&M-EST. PATIENT-LVL II: CPT | Mod: PBBFAC,,, | Performed by: OPHTHALMOLOGY

## 2018-11-05 PROCEDURE — 92014 COMPRE OPH EXAM EST PT 1/>: CPT | Mod: S$GLB,,, | Performed by: OPHTHALMOLOGY

## 2018-11-05 NOTE — TELEPHONE ENCOUNTER
----- Message from Tammy Hough sent at 11/5/2018 11:19 AM CST -----  Contact: pt  Would like to be worked in sooner for eye exam pt only want agusto she is schedule for next Thursday pls advise

## 2018-11-05 NOTE — PROGRESS NOTES
SUBJECTIVE:   Loida Holden is a 82 y.o. female   Uncorrected distance visual acuity was not recorded in the right eye and 20/25 +2 in the left eye. Uncorrected near visual acuity was J1 in the right eye and not recorded in the left eye.   Chief Complaint   Patient presents with    Exam        HPI:  HPI     Pt states that she is here so that she can get check out due to her   falling multiple times. She says that she has been having circulation   problems and has been dealing with intense body irregularities like   abnormal temperatures. She used to fall repeatedly on her right side, but   now is starting to fall more on her left side. She says that she wasn't   able to go to Dr. Dhillon due to her  passed away last year. She is   experiencing bleeding in her right eye and has been for the last two days.   She says there is no pain or discomfort, but her vision is blurrier than   before.     1. Dry AMD   2. PCIOL OU  YAG OU  3. Monovision OD near/OS distance  4. Brain Tumor Dx 7/14   5. Diet controlled DM    Last edited by Sonny Camargo, Patient Care Assistant on 11/5/2018  2:28   PM. (History)        Assessment /Plan :  1. Early dry stage nonexudative age-related macular degeneration of both eyes Exam consistent with mild age related macular degeneration OS > OD. Discussed clinical condition and recommend avoidance of tobacco products. Patient advised to call immediately if any visual changes occur. Regular follow up recommended.     Stable MOCT Done today   2. Meningioma Highly Recommend Follow up with  pt stated Numerous Fall's within the last week pt understood and will make appointment    3. Diabetes mellitus type 2 without retinopathy No diabetic retinopathy at this time. Reviewed diabetic eye precautions including avoiding tobacco use,  Good glucose control, and importance of regular follow up.      4. Pseudophakia  -- Condition stable, no therapeutic change required. Monitoring  routinely.     5.      Sub conj heme,Right eye explained DX in great detail to pt no treatment needed        RTC 6 month with Neuro HVF

## 2018-11-14 DIAGNOSIS — I10 ESSENTIAL HYPERTENSION: Chronic | ICD-10-CM

## 2018-11-14 DIAGNOSIS — I25.10 CORONARY ARTERY DISEASE, OCCLUSIVE: Chronic | ICD-10-CM

## 2018-11-14 DIAGNOSIS — I51.89 LEFT VENTRICULAR DIASTOLIC DYSFUNCTION WITH PRESERVED SYSTOLIC FUNCTION: ICD-10-CM

## 2018-11-14 RX ORDER — FUROSEMIDE 40 MG/1
TABLET ORAL
Qty: 180 TABLET | Refills: 3 | Status: SHIPPED | OUTPATIENT
Start: 2018-11-14

## 2019-01-02 DIAGNOSIS — I51.89 LEFT VENTRICULAR DIASTOLIC DYSFUNCTION WITH PRESERVED SYSTOLIC FUNCTION: ICD-10-CM

## 2019-01-02 DIAGNOSIS — I25.10 CORONARY ARTERY DISEASE, OCCLUSIVE: Chronic | ICD-10-CM

## 2019-01-02 DIAGNOSIS — I10 ESSENTIAL HYPERTENSION: Chronic | ICD-10-CM

## 2019-01-02 RX ORDER — METOPROLOL TARTRATE 50 MG/1
TABLET ORAL
Qty: 120 TABLET | Refills: 6 | Status: SHIPPED | OUTPATIENT
Start: 2019-01-02 | End: 2020-01-18

## 2019-05-09 ENCOUNTER — OFFICE VISIT (OUTPATIENT)
Dept: OPHTHALMOLOGY | Facility: CLINIC | Age: 84
End: 2019-05-09
Payer: MEDICARE

## 2019-05-09 DIAGNOSIS — H35.3131 EARLY DRY STAGE NONEXUDATIVE AGE-RELATED MACULAR DEGENERATION OF BOTH EYES: Primary | ICD-10-CM

## 2019-05-09 DIAGNOSIS — Z96.1 PSEUDOPHAKIA: ICD-10-CM

## 2019-05-09 DIAGNOSIS — D32.9 MENINGIOMA: ICD-10-CM

## 2019-05-09 PROCEDURE — 99499 UNLISTED E&M SERVICE: CPT | Mod: S$GLB,,, | Performed by: OPHTHALMOLOGY

## 2019-05-09 PROCEDURE — 92083 EXTENDED VISUAL FIELD XM: CPT | Mod: S$GLB,,, | Performed by: OPHTHALMOLOGY

## 2019-05-09 PROCEDURE — 92012 INTRM OPH EXAM EST PATIENT: CPT | Mod: S$GLB,,, | Performed by: OPHTHALMOLOGY

## 2019-05-09 PROCEDURE — 92083 HUMPHREY VISUAL FIELD - OU - BOTH EYES: ICD-10-PCS | Mod: S$GLB,,, | Performed by: OPHTHALMOLOGY

## 2019-05-09 PROCEDURE — 99999 PR PBB SHADOW E&M-EST. PATIENT-LVL II: CPT | Mod: PBBFAC,,, | Performed by: OPHTHALMOLOGY

## 2019-05-09 PROCEDURE — 99999 PR PBB SHADOW E&M-EST. PATIENT-LVL II: ICD-10-PCS | Mod: PBBFAC,,, | Performed by: OPHTHALMOLOGY

## 2019-05-09 PROCEDURE — 99499 RISK ADDL DX/OHS AUDIT: ICD-10-PCS | Mod: S$GLB,,, | Performed by: OPHTHALMOLOGY

## 2019-05-09 PROCEDURE — 92012 PR EYE EXAM, EST PATIENT,INTERMED: ICD-10-PCS | Mod: S$GLB,,, | Performed by: OPHTHALMOLOGY

## 2019-05-09 NOTE — PROGRESS NOTES
SUBJECTIVE:   Loida Holden is a 83 y.o. female   Uncorrected distance visual acuity was not recorded in the right eye and 20/20 -1 in the left eye. Uncorrected near visual acuity was J1 in the right eye and not recorded in the left eye. Comments: MONO VA.   Chief Complaint   Patient presents with    Macular Degeneration     6M HVF        HPI:  HPI     Macular Degeneration      Additional comments: 6M HVF              Comments     The patient states her eyes are doing okay but they have been burning.   The patient states she has had open heart surgery and surgery for veins in   her legs. The patient states she is seeing a brain surgeon in 2 weeks to   talk about brain surgery.    PCP Dr. Richardson    1. Dry AMD   2. PCIOL OU  YAG OU  3. Monovision OD near/OS distance  4. Brain Tumor Dx 7/14   5. Diet controlled +DM  6. Meningioma          Last edited by Holly Andrea on 5/9/2019 11:05 AM. (History)        Assessment /Plan :  1. Early dry stage nonexudative age-related macular degeneration of both eyes stable   2. Pseudophakia stable  -- Condition stable, no therapeutic change required. Monitoring routinely.     3. Meningioma    4.      Frequent falls with loss of motor control and pt says happens when looks down and left- possibly            ischemia induced event- pt is scheduled to see new Neurologist and will discuss these sx's.      Return to clinic in 6 months for dilation, MOCT, and Neuro VF  or as needed

## 2019-07-18 DIAGNOSIS — R07.89 CHEST PAIN, ATYPICAL: ICD-10-CM

## 2019-07-19 RX ORDER — CLOPIDOGREL BISULFATE 75 MG/1
TABLET ORAL
Qty: 30 TABLET | Refills: 11 | Status: SHIPPED | OUTPATIENT
Start: 2019-07-19 | End: 2020-08-16

## 2020-01-17 DIAGNOSIS — I25.10 CORONARY ARTERY DISEASE, OCCLUSIVE: Chronic | ICD-10-CM

## 2020-01-17 DIAGNOSIS — I51.89 LEFT VENTRICULAR DIASTOLIC DYSFUNCTION WITH PRESERVED SYSTOLIC FUNCTION: ICD-10-CM

## 2020-01-17 DIAGNOSIS — I10 ESSENTIAL HYPERTENSION: Chronic | ICD-10-CM

## 2020-01-18 RX ORDER — METOPROLOL TARTRATE 50 MG/1
TABLET ORAL
Qty: 120 TABLET | Refills: 0 | Status: SHIPPED | OUTPATIENT
Start: 2020-01-18 | End: 2020-03-12

## 2020-01-30 ENCOUNTER — OFFICE VISIT (OUTPATIENT)
Dept: OPHTHALMOLOGY | Facility: CLINIC | Age: 85
End: 2020-01-30
Payer: MEDICARE

## 2020-01-30 DIAGNOSIS — E11.9 DIABETES MELLITUS TYPE 2 WITHOUT RETINOPATHY: ICD-10-CM

## 2020-01-30 DIAGNOSIS — Z96.1 PSEUDOPHAKIA: ICD-10-CM

## 2020-01-30 DIAGNOSIS — H35.3131 EARLY DRY STAGE NONEXUDATIVE AGE-RELATED MACULAR DEGENERATION OF BOTH EYES: Primary | ICD-10-CM

## 2020-01-30 DIAGNOSIS — D32.9 MENINGIOMA: ICD-10-CM

## 2020-01-30 PROCEDURE — 99999 PR PBB SHADOW E&M-EST. PATIENT-LVL II: ICD-10-PCS | Mod: PBBFAC,,, | Performed by: OPHTHALMOLOGY

## 2020-01-30 PROCEDURE — 92014 COMPRE OPH EXAM EST PT 1/>: CPT | Mod: S$GLB,,, | Performed by: OPHTHALMOLOGY

## 2020-01-30 PROCEDURE — 92134 POSTERIOR SEGMENT OCT RETINA (OCULAR COHERENCE TOMOGRAPHY)-BOTH EYES: ICD-10-PCS | Mod: S$GLB,,, | Performed by: OPHTHALMOLOGY

## 2020-01-30 PROCEDURE — 92014 PR EYE EXAM, EST PATIENT,COMPREHESV: ICD-10-PCS | Mod: S$GLB,,, | Performed by: OPHTHALMOLOGY

## 2020-01-30 PROCEDURE — 92083 EXTENDED VISUAL FIELD XM: CPT | Mod: S$GLB,,, | Performed by: OPHTHALMOLOGY

## 2020-01-30 PROCEDURE — 92083 HUMPHREY VISUAL FIELD - OU - BOTH EYES: ICD-10-PCS | Mod: S$GLB,,, | Performed by: OPHTHALMOLOGY

## 2020-01-30 PROCEDURE — 99999 PR PBB SHADOW E&M-EST. PATIENT-LVL II: CPT | Mod: PBBFAC,,, | Performed by: OPHTHALMOLOGY

## 2020-01-30 PROCEDURE — 92134 CPTRZ OPH DX IMG PST SGM RTA: CPT | Mod: S$GLB,,, | Performed by: OPHTHALMOLOGY

## 2020-01-30 NOTE — LETTER
[facility]  [address]  [place] [address]  Phone: [phone]  Fax: [phone]   January 30, 2020    ALDO Richardson MD  [address]  [place] [address]    Patient: Loida Holden   MR Number: [id number]   YOB: 1935   Date of Visit: 1/30/2020       Dear Dr. Richardson:    Thank you for referring Loida Holden to me for evaluation. Here is my assessment and plan of care:    Assessment:  /Plan :  1. Early dry stage nonexudative age-related macular degeneration of both eyes Exam consistent with moderate age related macular degeneration OU with elevated risk findings. Discussed clinical condition and recommend avoidance of tobacco products.Patient instructed in the use of daily Amsler Grid, AREDS II formula. Patient advised to call immediately if any Amsler Grid / visual changes occur. Regular follow up recommended.      2. Pseudophakia  -- Condition stable, no therapeutic change required. Monitoring routinely.     3. Diabetes mellitus type 2 without retinopathy No diabetic retinopathy at this time. Reviewed diabetic eye precautions including avoiding tobacco use,  Good glucose control, and importance of regular follow up.      4. Meningioma  Sees Dr Benitez for scheduled imaging- stable per pt  And no ocular or visula fields changes     Return to clinic in 6 months for Neuro VF and MOCT or as needed            Below you will find my full exam findings. If you have questions, please do not hesitate to call me. I look forward to following Ms. Loida Holden along with you.    Sincerely,        George Bailey MD       CC  Biju Benitez MD             Base Eye Exam     Visual Acuity (Snellen - Linear)       Right Left    Dist sc  20/20 -2    Near sc J1           Tonometry (Applanation, 10:16 AM)       Right Left    Pressure 15 14.5          Pupils       Pupils Dark Light Shape React APD    Right PERRL 3 2 Round Brisk None     Left PERRL 3 2 Round Brisk None          Extraocular Movement       Right Left     Full Full          Neuro/Psych     Oriented x3:  Yes    Mood/Affect:  Normal          Dilation     Both eyes:  1% Mydriacyl, 2.5% Phenylephrine @ 10:16 AM            Slit Lamp and Fundus Exam     External Exam       Right Left    External 3+ Dermatochalasis, 3+ Brow Ptosis 2+ Dermatochalasis, 1+ Brow Ptosis    MRD1 4 mm 5 mm          Slit Lamp Exam       Right Left    Lids/Lashes 1+ Blepharitis 1+ Blepharitis    Conjunctiva/Sclera White and quiet White and quiet    Cornea Clear Clear    Anterior Chamber Deep and quiet Deep and quiet    Iris Round and reactive Round and reactive    Lens PC IOL, Clear capsulotomy PC IOL, Clear capsulotomy    Vitreous PVD PVD          Fundus Exam       Right Left    Disc Normal Normal    C/D Ratio 0.4 0.3    Macula 2+ Hard drusen, No Diabetic Retinopathy 3+ Hard drusen, No Diabetic Retinopathy    Vessels Normal Normal    Periphery Normal Normal

## 2020-01-30 NOTE — PROGRESS NOTES
SUBJECTIVE  Loida Holden is 84 y.o. female  Uncorrected distance visual acuity was not recorded in the right eye and 20/20 -2 in the left eye. Uncorrected near visual acuity was J1 in the right eye and not recorded in the left eye.   No chief complaint on file.         HPI     The patient states her eyes are doing fine.    PCP Dr. Richardson    1. Dry AMD   2. PCIOL OU  YAG OU  3. Monovision OD near/OS distance  4. Brain Tumor Dx 7/14 (Dr. Kim)  5. Diet controlled +DM  6. Meningioma  7. Frequent falls with loss of motor control    Last edited by Holly Andrea on 1/30/2020 10:08 AM. (History)         Assessment /Plan :  1. Early dry stage nonexudative age-related macular degeneration of both eyes Exam consistent with moderate age related macular degeneration OU with elevated risk findings. Discussed clinical condition and recommend avoidance of tobacco products.Patient instructed in the use of daily Amsler Grid, AREDS II formula. Patient advised to call immediately if any Amsler Grid / visual changes occur. Regular follow up recommended.      2. Pseudophakia  -- Condition stable, no therapeutic change required. Monitoring routinely.     3. Diabetes mellitus type 2 without retinopathy No diabetic retinopathy at this time. Reviewed diabetic eye precautions including avoiding tobacco use,  Good glucose control, and importance of regular follow up.      4. Meningioma  Sees Dr Benitez for scheduled imaging- stable per pt  And no ocular or visula fields changes     Return to clinic in 6 months for Neuro VF and MOCT or as needed

## 2020-03-12 DIAGNOSIS — I25.10 CORONARY ARTERY DISEASE, OCCLUSIVE: Chronic | ICD-10-CM

## 2020-03-12 DIAGNOSIS — I10 ESSENTIAL HYPERTENSION: Chronic | ICD-10-CM

## 2020-03-12 DIAGNOSIS — I51.89 LEFT VENTRICULAR DIASTOLIC DYSFUNCTION WITH PRESERVED SYSTOLIC FUNCTION: ICD-10-CM

## 2020-03-12 RX ORDER — METOPROLOL TARTRATE 50 MG/1
TABLET ORAL
Qty: 120 TABLET | Refills: 3 | Status: SHIPPED | OUTPATIENT
Start: 2020-03-12 | End: 2020-11-13

## 2020-10-05 ENCOUNTER — OFFICE VISIT (OUTPATIENT)
Dept: OPHTHALMOLOGY | Facility: CLINIC | Age: 85
End: 2020-10-05
Payer: MEDICARE

## 2020-10-05 DIAGNOSIS — Z96.1 PSEUDOPHAKIA: Primary | ICD-10-CM

## 2020-10-05 DIAGNOSIS — H35.3131 EARLY DRY STAGE NONEXUDATIVE AGE-RELATED MACULAR DEGENERATION OF BOTH EYES: ICD-10-CM

## 2020-10-05 DIAGNOSIS — D32.9 MENINGIOMA: ICD-10-CM

## 2020-10-05 PROCEDURE — 99999 PR PBB SHADOW E&M-EST. PATIENT-LVL II: CPT | Mod: PBBFAC,,, | Performed by: OPHTHALMOLOGY

## 2020-10-05 PROCEDURE — 92012 PR EYE EXAM, EST PATIENT,INTERMED: ICD-10-PCS | Mod: S$GLB,,, | Performed by: OPHTHALMOLOGY

## 2020-10-05 PROCEDURE — 92083 EXTENDED VISUAL FIELD XM: CPT | Mod: S$GLB,,, | Performed by: OPHTHALMOLOGY

## 2020-10-05 PROCEDURE — 92012 INTRM OPH EXAM EST PATIENT: CPT | Mod: S$GLB,,, | Performed by: OPHTHALMOLOGY

## 2020-10-05 PROCEDURE — 92083 HUMPHREY VISUAL FIELD - OU - BOTH EYES: ICD-10-PCS | Mod: S$GLB,,, | Performed by: OPHTHALMOLOGY

## 2020-10-05 PROCEDURE — 92134 CPTRZ OPH DX IMG PST SGM RTA: CPT | Mod: S$GLB,,, | Performed by: OPHTHALMOLOGY

## 2020-10-05 PROCEDURE — 99999 PR PBB SHADOW E&M-EST. PATIENT-LVL II: ICD-10-PCS | Mod: PBBFAC,,, | Performed by: OPHTHALMOLOGY

## 2020-10-05 PROCEDURE — 92134 OCT, RETINA - OU - BOTH EYES: ICD-10-PCS | Mod: S$GLB,,, | Performed by: OPHTHALMOLOGY

## 2020-10-05 NOTE — PROGRESS NOTES
SUBJECTIVE  Loida Holden is 84 y.o. female  Uncorrected distance visual acuity was 20/300 in the right eye and 20/20 in the left eye. Uncorrected near visual acuity was J1 ou in the right eye and not recorded in the left eye.   Chief Complaint   Patient presents with    Follow-up     10 month follow up with HVF and MOCT          HPI     Follow-up      Additional comments: 10 month follow up with HVF and MOCT              Comments     PT C/O: Since sx in 2018 patient has had problems with balance and falls   finds it happens mainly when she turns her head to left and looks down, no   changes in VA and states she would like a copy of her VF to bring to her   neurologist.         Neurologist -  fax # [phone] (wants letters)    1. Mod Dry AMD   2. PCIOL OU  YAG OU  Monovision OD near/OS distance  43 Brain Tumor Dx 7/14 (Dr. Kim)  Arachnoid cyst   Meningioma  4. Diet controlled DM x years  5. Frequent falls with loss of motor control          Last edited by Rowena Chavez, Patient Care Assistant on 10/5/2020  3:23   PM. (History)         Assessment /Plan :  1. Pseudophakia  -- Condition stable, no therapeutic change required. Monitoring routinely.     2. Early dry stage nonexudative age-related macular degeneration of both eyes  -- Condition stable, no therapeutic change required. Monitoring routinely.     3. Meningiomas no evidence of VF changes or ocular involvement- pt seen by Dr Nuñez     Return to clinic in 6 months for dilation, neuro visual field and MOCT  or as needed

## 2021-04-12 ENCOUNTER — OFFICE VISIT (OUTPATIENT)
Dept: OPHTHALMOLOGY | Facility: CLINIC | Age: 86
End: 2021-04-12
Payer: MEDICARE

## 2021-04-12 DIAGNOSIS — E11.9 DIABETES MELLITUS TYPE 2 WITHOUT RETINOPATHY: ICD-10-CM

## 2021-04-12 DIAGNOSIS — Z96.1 PSEUDOPHAKIA: ICD-10-CM

## 2021-04-12 DIAGNOSIS — H35.3131 EARLY DRY STAGE NONEXUDATIVE AGE-RELATED MACULAR DEGENERATION OF BOTH EYES: ICD-10-CM

## 2021-04-12 DIAGNOSIS — D32.9 MENINGIOMA: Primary | ICD-10-CM

## 2021-04-12 PROCEDURE — 92083 EXTENDED VISUAL FIELD XM: CPT | Mod: S$GLB,,, | Performed by: OPHTHALMOLOGY

## 2021-04-12 PROCEDURE — 92083 HUMPHREY VISUAL FIELD - OU - BOTH EYES: ICD-10-PCS | Mod: S$GLB,,, | Performed by: OPHTHALMOLOGY

## 2021-04-12 PROCEDURE — 99999 PR PBB SHADOW E&M-EST. PATIENT-LVL III: CPT | Mod: PBBFAC,,, | Performed by: OPHTHALMOLOGY

## 2021-04-12 PROCEDURE — 92134 CPTRZ OPH DX IMG PST SGM RTA: CPT | Mod: S$GLB,,, | Performed by: OPHTHALMOLOGY

## 2021-04-12 PROCEDURE — 92014 COMPRE OPH EXAM EST PT 1/>: CPT | Mod: S$GLB,,, | Performed by: OPHTHALMOLOGY

## 2021-04-12 PROCEDURE — 92134 OCT, RETINA - OU - BOTH EYES: ICD-10-PCS | Mod: S$GLB,,, | Performed by: OPHTHALMOLOGY

## 2021-04-12 PROCEDURE — 99999 PR PBB SHADOW E&M-EST. PATIENT-LVL III: ICD-10-PCS | Mod: PBBFAC,,, | Performed by: OPHTHALMOLOGY

## 2021-04-12 PROCEDURE — 92014 PR EYE EXAM, EST PATIENT,COMPREHESV: ICD-10-PCS | Mod: S$GLB,,, | Performed by: OPHTHALMOLOGY

## 2021-04-12 RX ORDER — GLIMEPIRIDE 1 MG/1
TABLET ORAL
COMMUNITY
Start: 2021-03-28

## 2021-07-01 ENCOUNTER — PATIENT MESSAGE (OUTPATIENT)
Dept: ADMINISTRATIVE | Facility: OTHER | Age: 86
End: 2021-07-01

## 2021-10-11 ENCOUNTER — TELEPHONE (OUTPATIENT)
Dept: OPHTHALMOLOGY | Facility: CLINIC | Age: 86
End: 2021-10-11

## 2023-06-05 ENCOUNTER — OFFICE VISIT (OUTPATIENT)
Dept: OPHTHALMOLOGY | Facility: CLINIC | Age: 88
End: 2023-06-05
Payer: MEDICARE

## 2023-06-05 DIAGNOSIS — D32.9 MENINGIOMA: ICD-10-CM

## 2023-06-05 DIAGNOSIS — Z96.1 PSEUDOPHAKIA: ICD-10-CM

## 2023-06-05 DIAGNOSIS — H52.7 REFRACTIVE ERROR: ICD-10-CM

## 2023-06-05 DIAGNOSIS — H40.013 OPEN ANGLE WITH BORDERLINE FINDINGS, LOW RISK, BILATERAL: Primary | ICD-10-CM

## 2023-06-05 PROCEDURE — 99999 PR PBB SHADOW E&M-EST. PATIENT-LVL III: CPT | Mod: PBBFAC,,, | Performed by: OPHTHALMOLOGY

## 2023-06-05 PROCEDURE — 92133 CPTRZD OPH DX IMG PST SGM ON: CPT | Mod: S$GLB,,, | Performed by: OPHTHALMOLOGY

## 2023-06-05 PROCEDURE — 92015 PR REFRACTION: ICD-10-PCS | Mod: S$GLB,,, | Performed by: OPHTHALMOLOGY

## 2023-06-05 PROCEDURE — 1159F PR MEDICATION LIST DOCUMENTED IN MEDICAL RECORD: ICD-10-PCS | Mod: CPTII,S$GLB,, | Performed by: OPHTHALMOLOGY

## 2023-06-05 PROCEDURE — 1160F RVW MEDS BY RX/DR IN RCRD: CPT | Mod: CPTII,S$GLB,, | Performed by: OPHTHALMOLOGY

## 2023-06-05 PROCEDURE — 92133 POSTERIOR SEGMENT OCT OPTIC NERVE(OCULAR COHERENCE TOMOGRAPHY) - OU - BOTH EYES: ICD-10-PCS | Mod: S$GLB,,, | Performed by: OPHTHALMOLOGY

## 2023-06-05 PROCEDURE — 1159F MED LIST DOCD IN RCRD: CPT | Mod: CPTII,S$GLB,, | Performed by: OPHTHALMOLOGY

## 2023-06-05 PROCEDURE — 99213 OFFICE O/P EST LOW 20 MIN: CPT | Mod: S$GLB,,, | Performed by: OPHTHALMOLOGY

## 2023-06-05 PROCEDURE — 99213 PR OFFICE/OUTPT VISIT, EST, LEVL III, 20-29 MIN: ICD-10-PCS | Mod: S$GLB,,, | Performed by: OPHTHALMOLOGY

## 2023-06-05 PROCEDURE — 92083 EXTENDED VISUAL FIELD XM: CPT | Mod: S$GLB,,, | Performed by: OPHTHALMOLOGY

## 2023-06-05 PROCEDURE — 99999 PR PBB SHADOW E&M-EST. PATIENT-LVL III: ICD-10-PCS | Mod: PBBFAC,,, | Performed by: OPHTHALMOLOGY

## 2023-06-05 PROCEDURE — 1160F PR REVIEW ALL MEDS BY PRESCRIBER/CLIN PHARMACIST DOCUMENTED: ICD-10-PCS | Mod: CPTII,S$GLB,, | Performed by: OPHTHALMOLOGY

## 2023-06-05 PROCEDURE — 92083 HUMPHREY VISUAL FIELD - OU - BOTH EYES: ICD-10-PCS | Mod: S$GLB,,, | Performed by: OPHTHALMOLOGY

## 2023-06-05 PROCEDURE — 92015 DETERMINE REFRACTIVE STATE: CPT | Mod: S$GLB,,, | Performed by: OPHTHALMOLOGY

## 2023-06-05 NOTE — PROGRESS NOTES
"SUBJECTIVE  Loida Holden is 87 y.o. female  Uncorrected distance visual acuity was 20/50 +1 in the right eye and 20/30 in the left eye.   Chief Complaint   Patient presents with    Follow-up     Pt reports for lost to follow up HVF, gOCT and IOP check. Pt states she was blind one day and suddenly turned black, it lasted 1 minute and it only happened once, this occurred last [place]. Her eyes have been gritty, tearing and feels "rose litter". She has 5 tumors in head, kidney cancer due to heart meds. No eye pain           HPI     Follow-up     Additional comments: Pt reports for lost to follow up HVF, gOCT and IOP   check. Pt states she was blind one day and suddenly turned black, it   lasted 1 minute and it only happened once, this occurred last Christmas.   Her eyes have been gritty, tearing and feels "rose litter". She has 5   tumors in head, kidney cancer due to heart meds. No eye pain            Comments    PCP Dr. Richardson   Neurologist -  fax # [phone] (wants letters)     1. Mod Dry AMD   2. PCIOL OU   YAG OU   Monovision OD near/OS distance   3. Brain Tumors Dx 7/14 (Dr. Kim)   Arachnoid cyst   Meningiomas   4. Diet controlled DM x years   5. Frequent falls with loss of motor control    Areds   amsler            Last edited by Fadumo Granados MA on 6/5/2023  3:00 PM.         Assessment /Plan :  1. Open angle with borderline findings, low risk, bilateral No evidence of glaucoma at this time but based on risk factors recommend to continue monitoring.    Return to clinic in 6 months  or as needed.  With GOCT and Dilation     2. Pseudophakia  -- Condition stable, no therapeutic change required. Monitoring routinely.     3. Meningioma - Following up with Neurologist   4. Refractive error - Dispensed SVL Rx               "

## 2023-07-20 NOTE — TELEPHONE ENCOUNTER
Called [facility] pharmacy spoke with Clare states no Hydralazine hasn't been called in yet.   Patient's wife requested the following prescriptions be approved, to get Pt through to upcoming appointment with new provider:    lisinopril (ZESTRIL) 2.5 MG tablet 90 tablet 1 4/12/2023  No   Sig: TAKE 1 TABLET BY MOUTH ONCE DAILY   Sent to pharmacy as: Lisinopril 2.5 MG Oral Tablet (ZESTRIL)   Class: E-Prescribe   Order: [id number]   E-Prescribing Status: Receipt confirmed by pharmacy (4/12/2023  1:21 PM CDT)     [facility] - [place], [place] - [address]     Per pharmacy, has 1 refill left. Hasn't picked up since April 12th.      atorvastatin (LIPITOR) 40 MG tablet 90 tablet 2     Sig: Take 1 tablet (40 mg) by mouth daily   Last Prescription Date:   10/17/22  Last Fill Qty/Refills:         90, R-2      Last Office Visit:              11/29/23 (Dr. Morrison)  Future Office visit:             Next 5 appointments (look out 90 days)    Jul 31, 2023  1:40 PM  Office Visit with Avelino Luque MD  [facility] ([facility] ) [address]  [place] [id number] [phone]        In clinical absence of Dr. Luque patient is requesting that this message be sent to the covering provider for consideration please.    Delilah Mckeon RN .............. 7/20/2023  9:51 AM

## (undated) DEVICE — DECANTER VIAL ASEPTIC TRANSFER

## (undated) DEVICE — CLIP LIGATING MEDIUM

## (undated) DEVICE — TUBING HEATED INSUFFLATOR

## (undated) DEVICE — APPLICATOR CHLORAPREP ORN 26ML

## (undated) DEVICE — SYR 30CC LUER LOCK

## (undated) DEVICE — MANIFOLD 4 PORT

## (undated) DEVICE — TAPE MEDIPORE 4IN X 2YDS

## (undated) DEVICE — SUT SILK 3-0 STRANDS 30IN

## (undated) DEVICE — HEMOSTAT SURGICEL FIBRLR 1X2IN

## (undated) DEVICE — STOPCOCK 1 WAY PLASTIC

## (undated) DEVICE — SUT SILK 0 SUTUPAK SA86H

## (undated) DEVICE — CLIPS LIGATING TITANIUM WDE SM

## (undated) DEVICE — DRESSING TRANS 4X4 TEGADERM

## (undated) DEVICE — TAPE RETRACT-O-TAPE

## (undated) DEVICE — SOL ISOLYTE S PH 7.4 1000ML

## (undated) DEVICE — SOL WATER STRL IRR 1000ML

## (undated) DEVICE — CATH ALL PUR URTHL RR 20FR

## (undated) DEVICE — DRESSING TELFA N ADH 3X8

## (undated) DEVICE — ELECTRODE REM PLYHSV RETURN 9

## (undated) DEVICE — DRESSING GAUZE 6PLY 4X4

## (undated) DEVICE — DRESSING N ADH OIL EMUL 3X3

## (undated) DEVICE — SUT PROLENE 7-0 BV-1 30 BLU

## (undated) DEVICE — PACK OPEN HEART BR

## (undated) DEVICE — TRAY FOLEY SURESTEP 16FR

## (undated) DEVICE — SUT STL DBL WR STNAL CCS#1

## (undated) DEVICE — SUT SILK 3-0 LIGAPAK LA54G

## (undated) DEVICE — LIGATING CLIP LARGE

## (undated) DEVICE — CAUTERY TIP POLISHER

## (undated) DEVICE — DRESSING TRANS 4X10 TEGADERM

## (undated) DEVICE — SYR 3CC LUER LOC

## (undated) DEVICE — CONNECTOR 1/2X3/8 STERILE

## (undated) DEVICE — STABILIZER TRIPOD

## (undated) DEVICE — PUNCH AORTIC 4.8MM

## (undated) DEVICE — SUT SILK 1 BLK BRAID SA87G

## (undated) DEVICE — PENCIL ROCKER SWITCH 10FT CORD

## (undated) DEVICE — HEMOSTAT SURGICEL 4X8IN

## (undated) DEVICE — CONNECTOR CHEST DRN TRPL

## (undated) DEVICE — Device

## (undated) DEVICE — SYS VEIN HARVESTING

## (undated) DEVICE — ORGNZR TUBING CLR W/CLIPS

## (undated) DEVICE — DRAIN CHAN RND HUBLS 8MM 24FR

## (undated) DEVICE — DRAIN CHANNEL ROUND 19FR

## (undated) DEVICE — SUT PROLENE 5/0 RB-1 36 IN

## (undated) DEVICE — DRAPE SLUSH WARMER WITH DISC

## (undated) DEVICE — PUNCH AORTIC 4.0MM 6/CASE

## (undated) DEVICE — CONTAINER SPECIMEN STRL 4OZ

## (undated) DEVICE — SPONGE LAP 18X18 PREWASHED

## (undated) DEVICE — SUT VICRYL 4-0 27 PS-1 27IN

## (undated) DEVICE — SOL LR INJ 1000ML BG

## (undated) DEVICE — SUT PLEDGET LG SOFT

## (undated) DEVICE — NDL PERC ENTRY BSDN 18-7.0

## (undated) DEVICE — SEE L#133928

## (undated) DEVICE — SPONGE LAP 4X18 PREWASHED

## (undated) DEVICE — GAUZE SPONGE 4X4 12PLY

## (undated) DEVICE — SEE MEDLINE ITEM 157125

## (undated) DEVICE — DRAIN CHEST DRY SUCTION

## (undated) DEVICE — CATH URET ROBINSON 8F 16IN RED

## (undated) DEVICE — COVER OVERHEAD SURG LT BLUE

## (undated) DEVICE — GUIDEWIRE TSCF-35-50-3

## (undated) DEVICE — SYS PROX SEAL AORT PNCH 4.3MM

## (undated) DEVICE — SEE MEDLINE ITEM 146308

## (undated) DEVICE — SEE MEDLINE ITEM 153199

## (undated) DEVICE — CANNULA VENOUS MC2X 29FR

## (undated) DEVICE — SYR 10CC LUER LOCK

## (undated) DEVICE — SOL NS 1000CC

## (undated) DEVICE — SEE MEDLINE ITEM 157117

## (undated) DEVICE — SUT SILK 2-0 STRANDS 30IN

## (undated) DEVICE — EVACUATOR WOUND BULB 100CC

## (undated) DEVICE — SEE MEDLINE ITEM 152622

## (undated) DEVICE — SEE MEDLINE ITEM 146231

## (undated) DEVICE — SUT VICRYL 2-0 27 CT-1

## (undated) DEVICE — FILTER FASTSTART KIT 40 MICRON

## (undated) DEVICE — BLADE STERNUM SYSTEM 6

## (undated) DEVICE — SUT PROLENE 8-0 24IN

## (undated) DEVICE — SOL 9P NACL IRR PIC IL

## (undated) DEVICE — DRESSING TRANS 2X2 TEGADERM

## (undated) DEVICE — CABLE PACING SURG SM DISP ST

## (undated) DEVICE — SPONGE IV DRAIN 4X4 STERILE